# Patient Record
Sex: FEMALE | Race: WHITE | NOT HISPANIC OR LATINO
[De-identification: names, ages, dates, MRNs, and addresses within clinical notes are randomized per-mention and may not be internally consistent; named-entity substitution may affect disease eponyms.]

---

## 2020-09-03 ENCOUNTER — LABORATORY RESULT (OUTPATIENT)
Age: 40
End: 2020-09-03

## 2020-09-03 ENCOUNTER — APPOINTMENT (OUTPATIENT)
Dept: HEMATOLOGY ONCOLOGY | Facility: CLINIC | Age: 40
End: 2020-09-03
Payer: MEDICAID

## 2020-09-03 ENCOUNTER — OUTPATIENT (OUTPATIENT)
Dept: OUTPATIENT SERVICES | Facility: HOSPITAL | Age: 40
LOS: 1 days | Discharge: HOME | End: 2020-09-03

## 2020-09-03 VITALS
DIASTOLIC BLOOD PRESSURE: 90 MMHG | RESPIRATION RATE: 14 BRPM | SYSTOLIC BLOOD PRESSURE: 131 MMHG | BODY MASS INDEX: 26.43 KG/M2 | WEIGHT: 140 LBS | TEMPERATURE: 99.2 F | HEART RATE: 78 BPM | HEIGHT: 61 IN

## 2020-09-03 DIAGNOSIS — Z87.898 PERSONAL HISTORY OF OTHER SPECIFIED CONDITIONS: ICD-10-CM

## 2020-09-03 DIAGNOSIS — Z78.9 OTHER SPECIFIED HEALTH STATUS: ICD-10-CM

## 2020-09-03 DIAGNOSIS — Z83.3 FAMILY HISTORY OF DIABETES MELLITUS: ICD-10-CM

## 2020-09-03 DIAGNOSIS — Z87.891 PERSONAL HISTORY OF NICOTINE DEPENDENCE: ICD-10-CM

## 2020-09-03 DIAGNOSIS — Z00.00 ENCOUNTER FOR GENERAL ADULT MEDICAL EXAMINATION W/OUT ABNORMAL FINDINGS: ICD-10-CM

## 2020-09-03 DIAGNOSIS — E28.2 POLYCYSTIC OVARIAN SYNDROME: ICD-10-CM

## 2020-09-03 DIAGNOSIS — Z80.0 FAMILY HISTORY OF MALIGNANT NEOPLASM OF DIGESTIVE ORGANS: ICD-10-CM

## 2020-09-03 PROCEDURE — 99215 OFFICE O/P EST HI 40 MIN: CPT

## 2020-09-04 LAB
ALBUMIN SERPL ELPH-MCNC: 4.4 G/DL
ALP BLD-CCNC: 91 U/L
ALT SERPL-CCNC: 17 U/L
ANION GAP SERPL CALC-SCNC: 16 MMOL/L
APTT BLD: 34 SEC
AST SERPL-CCNC: 20 U/L
BILIRUB SERPL-MCNC: 0.3 MG/DL
BUN SERPL-MCNC: 17 MG/DL
CALCIUM SERPL-MCNC: 10.5 MG/DL
CHLORIDE SERPL-SCNC: 98 MMOL/L
CO2 SERPL-SCNC: 25 MMOL/L
CREAT SERPL-MCNC: 1 MG/DL
GLUCOSE SERPL-MCNC: 89 MG/DL
HBV CORE IGM SER QL: NONREACTIVE
HBV SURFACE AB SER QL: REACTIVE
HBV SURFACE AG SER QL: NONREACTIVE
HCG UR QL: NEGATIVE
HCT VFR BLD CALC: 41.5 %
HCV AB SER QL: NONREACTIVE
HCV S/CO RATIO: 0.08 S/CO
HGB BLD-MCNC: 13.6 G/DL
HIV1+2 AB SPEC QL IA.RAPID: NONREACTIVE
INR PPP: 1.11 RATIO
LDH SERPL-CCNC: 290
MCHC RBC-ENTMCNC: 27.5 PG
MCHC RBC-ENTMCNC: 32.8 G/DL
MCV RBC AUTO: 83.8 FL
PLATELET # BLD AUTO: 390 K/UL
PMV BLD: 9.4 FL
POTASSIUM SERPL-SCNC: 5 MMOL/L
PROT SERPL-MCNC: 7.4 G/DL
PT BLD: 12.8 SEC
RBC # BLD: 4.95 M/UL
RBC # FLD: 11.9 %
SODIUM SERPL-SCNC: 139 MMOL/L
WBC # FLD AUTO: 8.33 K/UL

## 2020-09-04 NOTE — HISTORY OF PRESENT ILLNESS
[de-identified] : This is a 39 year old female who is here for evaluation.\par Pt usually lives in [place] and was evaluated in a local hospital for complaints of  SOB and cough since 3 months.\par She underwent work up as noted below:\par Ct neck and chest showed:\par B/L pleural effusions and near complete collapse of left lung.\par A large soft tissue mass in superior mediastinum measuring 8.2 X 6 cm. Soft tissue mass extending along left lateral margin of sternum and extending to th eanterior abdominal wall.\par Enlarged B/L cervical LNs with a 2.0 x 2.7 on right side and 1.7 x1.6 on left side and B/L supraclavicular Lymphadenopathy.\par \par Pt underwent US guided biopsy of the chest wall mass and cervical LN excision\par Flow cytometry was negative.\par Pathology on the LNs was benign.\par USG guided core biopsy was inconclusive with suspicion for Classic Hodgkin Lymphoma.\par She also underwent thoracentesis\par Pt feels better since thoracentesis.\par She has H/O drenching night sweats and itching\par no fever.\par Wt loss 5-10 lbs.\par \par She transferred her care to [place] as she has family here. pt has not been treated yet.

## 2020-09-04 NOTE — REVIEW OF SYSTEMS
[Patient Intake Form Reviewed] : Patient intake form was reviewed [Night Sweats] : night sweats [Chills] : chills [Negative] : Eyes [Recent Change In Weight] : ~T recent weight change

## 2020-09-04 NOTE — PHYSICAL EXAM
[Fully active, able to carry on all pre-disease performance without restriction] : Status 0 - Fully active, able to carry on all pre-disease performance without restriction [Thin] : thin [Normal] : grossly intact [de-identified] : B/L enlarged cervical LNs, Rt side steri strips in place [de-identified] : Decreased air entry left base [de-identified] : LNs as mentioned above [de-identified] : Left breast is larger than right

## 2020-09-04 NOTE — ASSESSMENT
[FreeTextEntry1] : In summary this is a very pleasant 39 year old woman with bulky lymphadenopathy involving the neck and mediastinal LNs and presence of Left pleural effusion.\par The pathology so far is inconclusive.\par I spoke at length with the pathologist at the [facility] who could not confirm the diagnosis of Hodgkin lymphoma due to limited material. The Cervical LN per the pathologist looked benign although detailed IHC was not performed on it.\par The slides and the block has been recd at [facility].\par I discussed with the pt and her sister that Hodgkin lymphoma is high on the differential but it is important to have the slides reviewed and an accurate diagnosis obtained. If the material recd by pathology is not adequate she may need a rebiopsy.\par I discussed that staging w/u needs to be completed and discussed that treatment is likely to be chemotherapy +/- RT. Side effects of chemo were briefly discussed. \par I will send her for a PET scan and ECHO.\par She underwent labs today.\par She has an appt in [facility] next week and will return for follow up after that appt.\par She was advised to come to ER should she develop symptoms of SOB, chest pain etc. She has a pulse oximeter and has been monitoring her O2 sat.\par All of her questions and concerns were addressed.

## 2020-09-09 ENCOUNTER — APPOINTMENT (OUTPATIENT)
Dept: HEMATOLOGY ONCOLOGY | Facility: CLINIC | Age: 40
End: 2020-09-09

## 2020-09-09 DIAGNOSIS — J90 PLEURAL EFFUSION, NOT ELSEWHERE CLASSIFIED: ICD-10-CM

## 2020-09-09 DIAGNOSIS — C81.90 HODGKIN LYMPHOMA, UNSPECIFIED, UNSPECIFIED SITE: ICD-10-CM

## 2020-12-23 ENCOUNTER — RESULT REVIEW (OUTPATIENT)
Age: 40
End: 2020-12-23

## 2020-12-23 ENCOUNTER — OUTPATIENT (OUTPATIENT)
Dept: OUTPATIENT SERVICES | Facility: HOSPITAL | Age: 40
LOS: 1 days | Discharge: HOME | End: 2020-12-23
Payer: MEDICAID

## 2020-12-23 DIAGNOSIS — C81.90 HODGKIN LYMPHOMA, UNSPECIFIED, UNSPECIFIED SITE: ICD-10-CM

## 2020-12-23 LAB — GLUCOSE BLDC GLUCOMTR-MCNC: 82 MG/DL — SIGNIFICANT CHANGE UP (ref 70–99)

## 2020-12-23 PROCEDURE — 78815 PET IMAGE W/CT SKULL-THIGH: CPT | Mod: 26,PI

## 2021-01-04 ENCOUNTER — APPOINTMENT (OUTPATIENT)
Dept: HEMATOLOGY ONCOLOGY | Facility: CLINIC | Age: 41
End: 2021-01-04
Payer: MEDICAID

## 2021-01-04 ENCOUNTER — OUTPATIENT (OUTPATIENT)
Dept: OUTPATIENT SERVICES | Facility: HOSPITAL | Age: 41
LOS: 1 days | Discharge: HOME | End: 2021-01-04

## 2021-01-04 VITALS
RESPIRATION RATE: 16 BRPM | TEMPERATURE: 98.6 F | DIASTOLIC BLOOD PRESSURE: 73 MMHG | HEIGHT: 61 IN | BODY MASS INDEX: 24.55 KG/M2 | SYSTOLIC BLOOD PRESSURE: 108 MMHG | WEIGHT: 130 LBS | HEART RATE: 93 BPM

## 2021-01-04 PROCEDURE — 99215 OFFICE O/P EST HI 40 MIN: CPT

## 2021-01-10 NOTE — ASSESSMENT
[FreeTextEntry1] : In summary this is a very pleasant 39 year old woman with bulky lymphadenopathy involving the neck and mediastinal LNs and presence of Left pleural effusion.\par \par \par Hogkin's Lymphoma Diagnosed by biopsy of mediastinal conglerumate at [place] ( pt did not bring biopsy report with her ) \par The pathology from [place] was  inconclusive.\par We spoke at length with the pathologist at the [place] hospital who could not confirm the diagnosis of Hodgkin lymphoma due to limited material. The Cervical LN per the pathologist looked benign although detailed IHC was not performed on it.\par Since her last visit pt had 2 PET scans done , one at [place]  ( hard copy provided by pt \par ) and other one on 12/23/20 by radiology department here . Both results were reviewed with her and they both showed advanced disease ,  for instance multifocal FDG avid lymphadenopathy seen within the head and neck and thorax. Most extensive region of disease is within the anterior mediastinum with extension into the chest wall. Maximum SUV measures 17.7 in the right cervical chain.\par Large left pleural effusion which is not FDG avid with resultant near complete atelectasis of the left lung.\par Right tracheal deviation secondary to lymphadenopathy.\par \par Pt received 1 cycle of low dose ABVD at [facility] in [place] and does not want to get any more treatments. Reports is getting iv vitamin infusions now and they are helping her , she was educated about the prognosis and treatment of hogkin's lymphoma . She was told that with treatment pt's can have an excellent prognosis , with prolonged disease free and overall survival.\par Pt and her sister both were adamant that they will like to continue with holistic medicine and refuse medical treatment . \par They were told that they can come back to us for treatment if they change their mind . \par All of her questions and concerns were addressed.

## 2021-01-10 NOTE — PHYSICAL EXAM
[Fully active, able to carry on all pre-disease performance without restriction] : Status 0 - Fully active, able to carry on all pre-disease performance without restriction [Thin] : thin [Normal] : affect appropriate [de-identified] : Lost 10 pounds since last visit  [de-identified] : B/L enlarged cervical LNs, Rt side steri strips in place, not cooperative with exam  [de-identified] : Left breast is larger than right [de-identified] : Decreased air entry left lung from apex to base  [de-identified] : LNs as mentioned above

## 2021-01-10 NOTE — HISTORY OF PRESENT ILLNESS
[de-identified] : This is a 39 year old female who is here for evaluation.\par Pt usually lives in [place] and was evaluated in a local hospital for complaints of  SOB and cough since 3 months.\par She underwent work up as noted below:\par Ct neck and chest showed:\par B/L pleural effusions and near complete collapse of left lung.\par A large soft tissue mass in superior mediastinum measuring 8.2 X 6 cm. Soft tissue mass extending along left lateral margin of sternum and extending to th eanterior abdominal wall.\par Enlarged B/L cervical LNs with a 2.0 x 2.7 on right side and 1.7 x1.6 on left side and B/L supraclavicular Lymphadenopathy.\par \par Pt underwent US guided biopsy of the chest wall mass and cervical LN excision\par Flow cytometry was negative.\par Pathology on the LNs was benign.\par USG guided core biopsy was inconclusive with suspicion for Classic Hodgkin Lymphoma.\par She also underwent thoracentesis\par Pt feels better since thoracentesis.\par She has H/O drenching night sweats and itching\par no fever.\par Wt loss 5-10 lbs.\par \par She transferred her care to [place] as she has family here. pt has not been treated yet. [de-identified] : 1/4/21 \par Pt presented for f/u today , she was initially seen in September of 2020, at that time pt was evaluated for bulky mediastinal and cervical lymphadenopathy and pathology was inconclusive . She was encouraged to get second opinion as well as get records from hospital in [place] . She was also given script for PET scan . Pt was then lost to f/u and presented today . She states that she was seen at [facility] and had PET  scan that showed infiltrative large caliber prrimarily mediastinal tumor , that encased heart , involved pericardium . Numerous FDG avid cervical , supraclavicular  , subpectoral , mediastinal and hilar lymph nodes . Pt then had biopsy over there bt [facility] , she did not bring the results of that biopsy . Pt then saw a Holistic Doctor , and decided to get holistic treatment ,she went to [place] and was there for 4 months . States that she received 1 cycle of low dose ABVD , developed anaphylactic reaction to Dacarbazine , and decided not to get chemo . She wishes to c/w holistic treatment for now . She did get a repeat PET scan on 12/23/20 that showed.\par IMPRESSION:\par Multifocal FDG avid lymphadenopathy seen within the head and neck and thorax. Most extensive region of disease is within the anterior mediastinum with extension into the chest wall. Maximum SUV measures 17.7 in the right cervical chain.\par Large left pleural effusion which is not FDG avid with resultant near complete atelectasis of the left lung.\par Small pericardial effusion, not FDG avid.\par Right tracheal deviation secondary to lymphadenopathy.\par \par Pt believes that she is responding to Holistic treatment and wishes not to pursue with chemotherapy . She reports she feels fine and has no SOB or any other symptoms. Reports night sweats have improved as well. Results and possible treatment options were discussed with pt and her sister. They do not wish to discuss treatment anymore .

## 2021-01-10 NOTE — REVIEW OF SYSTEMS
[Patient Intake Form Reviewed] : Patient intake form was reviewed [Chills] : chills [Night Sweats] : night sweats [Recent Change In Weight] : ~T recent weight change [Negative] : Neurological [Chest Pain] : no chest pain [Palpitations] : no palpitations [FreeTextEntry2] : Reports improvement in her symptoms  [Leg Claudication] : no intermittent leg claudication [FreeTextEntry6] : denies SOB

## 2021-01-12 DIAGNOSIS — J90 PLEURAL EFFUSION, NOT ELSEWHERE CLASSIFIED: ICD-10-CM

## 2021-01-12 DIAGNOSIS — C81.90 HODGKIN LYMPHOMA, UNSPECIFIED, UNSPECIFIED SITE: ICD-10-CM

## 2021-02-27 ENCOUNTER — INPATIENT (INPATIENT)
Facility: HOSPITAL | Age: 41
LOS: 6 days | Discharge: HOME | End: 2021-03-06
Attending: INTERNAL MEDICINE | Admitting: INTERNAL MEDICINE
Payer: COMMERCIAL

## 2021-02-27 VITALS
RESPIRATION RATE: 18 BRPM | OXYGEN SATURATION: 98 % | WEIGHT: 119.93 LBS | DIASTOLIC BLOOD PRESSURE: 72 MMHG | HEIGHT: 66 IN | HEART RATE: 120 BPM | SYSTOLIC BLOOD PRESSURE: 114 MMHG | TEMPERATURE: 99 F

## 2021-02-27 LAB
ALBUMIN FLD-MCNC: 2.8 G/DL — SIGNIFICANT CHANGE UP
ALBUMIN SERPL ELPH-MCNC: 3.6 G/DL — SIGNIFICANT CHANGE UP (ref 3.5–5.2)
ALP SERPL-CCNC: 105 U/L — SIGNIFICANT CHANGE UP (ref 30–115)
ALT FLD-CCNC: 20 U/L — SIGNIFICANT CHANGE UP (ref 0–41)
ANION GAP SERPL CALC-SCNC: 14 MMOL/L — SIGNIFICANT CHANGE UP (ref 7–14)
APTT BLD: 35.5 SEC — SIGNIFICANT CHANGE UP (ref 27–39.2)
AST SERPL-CCNC: 37 U/L — SIGNIFICANT CHANGE UP (ref 0–41)
B PERT IGG+IGM PNL SER: ABNORMAL
BASE EXCESS BLDV CALC-SCNC: 3.5 MMOL/L — HIGH (ref -2–2)
BASOPHILS # BLD AUTO: 0.02 K/UL — SIGNIFICANT CHANGE UP (ref 0–0.2)
BASOPHILS NFR BLD AUTO: 0.2 % — SIGNIFICANT CHANGE UP (ref 0–1)
BILIRUB SERPL-MCNC: 0.2 MG/DL — SIGNIFICANT CHANGE UP (ref 0.2–1.2)
BLD GP AB SCN SERPL QL: SIGNIFICANT CHANGE UP
BUN SERPL-MCNC: 10 MG/DL — SIGNIFICANT CHANGE UP (ref 10–20)
CA-I SERPL-SCNC: 1.15 MMOL/L — SIGNIFICANT CHANGE UP (ref 1.12–1.3)
CALCIUM SERPL-MCNC: 9.3 MG/DL — SIGNIFICANT CHANGE UP (ref 8.5–10.1)
CHLORIDE SERPL-SCNC: 97 MMOL/L — LOW (ref 98–110)
CO2 SERPL-SCNC: 24 MMOL/L — SIGNIFICANT CHANGE UP (ref 17–32)
COLOR FLD: YELLOW — SIGNIFICANT CHANGE UP
CREAT SERPL-MCNC: 0.7 MG/DL — SIGNIFICANT CHANGE UP (ref 0.7–1.5)
EOSINOPHIL # BLD AUTO: 0.05 K/UL — SIGNIFICANT CHANGE UP (ref 0–0.7)
EOSINOPHIL NFR BLD AUTO: 0.4 % — SIGNIFICANT CHANGE UP (ref 0–8)
FLUID INTAKE SUBSTANCE CLASS: SIGNIFICANT CHANGE UP
FLUID SEGMENTED GRANULOCYTES: 20 % — SIGNIFICANT CHANGE UP
FOLATE+VIT B12 SERBLD-IMP: 0 % — SIGNIFICANT CHANGE UP
GAS PNL BLDV: 139 MMOL/L — SIGNIFICANT CHANGE UP (ref 136–145)
GAS PNL BLDV: SIGNIFICANT CHANGE UP
GAS PNL BLDV: SIGNIFICANT CHANGE UP
GLUCOSE FLD-MCNC: 128 MG/DL — SIGNIFICANT CHANGE UP
GLUCOSE SERPL-MCNC: 121 MG/DL — HIGH (ref 70–99)
GRAM STN FLD: SIGNIFICANT CHANGE UP
HCG SERPL QL: NEGATIVE — SIGNIFICANT CHANGE UP
HCO3 BLDV-SCNC: 28 MMOL/L — SIGNIFICANT CHANGE UP (ref 22–29)
HCT VFR BLD CALC: 31.5 % — LOW (ref 37–47)
HCT VFR BLD CALC: 34.6 % — LOW (ref 37–47)
HCT VFR BLDA CALC: 33.4 % — LOW (ref 34–44)
HGB BLD CALC-MCNC: 10.9 G/DL — LOW (ref 14–18)
HGB BLD-MCNC: 11 G/DL — LOW (ref 12–16)
HGB BLD-MCNC: 9.8 G/DL — LOW (ref 12–16)
IMM GRANULOCYTES NFR BLD AUTO: 0.3 % — SIGNIFICANT CHANGE UP (ref 0.1–0.3)
INR BLD: 1.4 RATIO — HIGH (ref 0.65–1.3)
IRON SATN MFR SERPL: 21 UG/DL — LOW (ref 35–150)
LACTATE BLDV-MCNC: SIGNIFICANT CHANGE UP MMOL/L (ref 0.5–1.6)
LDH SERPL L TO P-CCNC: 99 U/L — SIGNIFICANT CHANGE UP
LYMPHOCYTES # BLD AUTO: 0.89 K/UL — LOW (ref 1.2–3.4)
LYMPHOCYTES # BLD AUTO: 6.9 % — LOW (ref 20.5–51.1)
LYMPHOCYTES # FLD: 60 — SIGNIFICANT CHANGE UP
MCHC RBC-ENTMCNC: 24.5 PG — LOW (ref 27–31)
MCHC RBC-ENTMCNC: 24.9 PG — LOW (ref 27–31)
MCHC RBC-ENTMCNC: 31.1 G/DL — LOW (ref 32–37)
MCHC RBC-ENTMCNC: 31.8 G/DL — LOW (ref 32–37)
MCV RBC AUTO: 78.3 FL — LOW (ref 81–99)
MCV RBC AUTO: 78.8 FL — LOW (ref 81–99)
MONOCYTES # BLD AUTO: 0.8 K/UL — HIGH (ref 0.1–0.6)
MONOCYTES NFR BLD AUTO: 6.2 % — SIGNIFICANT CHANGE UP (ref 1.7–9.3)
MONOS+MACROS # FLD: 20 % — SIGNIFICANT CHANGE UP
NEUTROPHILS # BLD AUTO: 11.05 K/UL — HIGH (ref 1.4–6.5)
NEUTROPHILS NFR BLD AUTO: 86 % — HIGH (ref 42.2–75.2)
NRBC # BLD: 0 /100 WBCS — SIGNIFICANT CHANGE UP (ref 0–0)
NRBC # BLD: 0 /100 WBCS — SIGNIFICANT CHANGE UP (ref 0–0)
NT-PROBNP SERPL-SCNC: 39 PG/ML — SIGNIFICANT CHANGE UP (ref 0–300)
PCO2 BLDV: 44 MMHG — SIGNIFICANT CHANGE UP (ref 41–51)
PH BLDV: 7.42 — SIGNIFICANT CHANGE UP (ref 7.26–7.43)
PH FLD: 7.8 — SIGNIFICANT CHANGE UP
PLATELET # BLD AUTO: 385 K/UL — SIGNIFICANT CHANGE UP (ref 130–400)
PLATELET # BLD AUTO: 453 K/UL — HIGH (ref 130–400)
PO2 BLDV: 27 MMHG — SIGNIFICANT CHANGE UP (ref 20–40)
POTASSIUM BLDV-SCNC: 4.1 MMOL/L — SIGNIFICANT CHANGE UP (ref 3.3–5.6)
POTASSIUM SERPL-MCNC: 4.8 MMOL/L — SIGNIFICANT CHANGE UP (ref 3.5–5)
POTASSIUM SERPL-SCNC: 4.8 MMOL/L — SIGNIFICANT CHANGE UP (ref 3.5–5)
PROT FLD-MCNC: 4.6 G/DL — SIGNIFICANT CHANGE UP
PROT SERPL-MCNC: 6.9 G/DL — SIGNIFICANT CHANGE UP (ref 6–8)
PROTHROM AB SERPL-ACNC: 16.1 SEC — HIGH (ref 9.95–12.87)
RAPID RVP RESULT: SIGNIFICANT CHANGE UP
RBC # BLD: 4 M/UL — LOW (ref 4.2–5.4)
RBC # BLD: 4.42 M/UL — SIGNIFICANT CHANGE UP (ref 4.2–5.4)
RBC # FLD: 14.7 % — HIGH (ref 11.5–14.5)
RBC # FLD: 15.1 % — HIGH (ref 11.5–14.5)
RCV VOL RI: 0 /UL — SIGNIFICANT CHANGE UP (ref 0–0)
SAO2 % BLDV: 42 % — SIGNIFICANT CHANGE UP
SARS-COV-2 RNA SPEC QL NAA+PROBE: SIGNIFICANT CHANGE UP
SODIUM SERPL-SCNC: 135 MMOL/L — SIGNIFICANT CHANGE UP (ref 135–146)
SPECIMEN SOURCE: SIGNIFICANT CHANGE UP
TOTAL NUCLEATED CELL COUNT, BODY FLUID: 663 /UL — SIGNIFICANT CHANGE UP
TROPONIN T SERPL-MCNC: <0.01 NG/ML — SIGNIFICANT CHANGE UP
TUBE TYPE: SIGNIFICANT CHANGE UP
WBC # BLD: 12.85 K/UL — HIGH (ref 4.8–10.8)
WBC # BLD: 8.93 K/UL — SIGNIFICANT CHANGE UP (ref 4.8–10.8)
WBC # FLD AUTO: 12.85 K/UL — HIGH (ref 4.8–10.8)
WBC # FLD AUTO: 8.93 K/UL — SIGNIFICANT CHANGE UP (ref 4.8–10.8)

## 2021-02-27 PROCEDURE — 99291 CRITICAL CARE FIRST HOUR: CPT | Mod: 25

## 2021-02-27 PROCEDURE — 71275 CT ANGIOGRAPHY CHEST: CPT | Mod: 26

## 2021-02-27 PROCEDURE — 93010 ELECTROCARDIOGRAM REPORT: CPT

## 2021-02-27 PROCEDURE — 32554 ASPIRATE PLEURA W/O IMAGING: CPT

## 2021-02-27 PROCEDURE — 71045 X-RAY EXAM CHEST 1 VIEW: CPT | Mod: 26

## 2021-02-27 RX ORDER — ONDANSETRON 8 MG/1
4 TABLET, FILM COATED ORAL ONCE
Refills: 0 | Status: COMPLETED | OUTPATIENT
Start: 2021-02-27 | End: 2021-02-27

## 2021-02-27 RX ORDER — ENOXAPARIN SODIUM 100 MG/ML
40 INJECTION SUBCUTANEOUS AT BEDTIME
Refills: 0 | Status: DISCONTINUED | OUTPATIENT
Start: 2021-02-27 | End: 2021-03-04

## 2021-02-27 RX ORDER — SENNA PLUS 8.6 MG/1
2 TABLET ORAL AT BEDTIME
Refills: 0 | Status: DISCONTINUED | OUTPATIENT
Start: 2021-02-27 | End: 2021-03-06

## 2021-02-27 RX ORDER — MORPHINE SULFATE 50 MG/1
1 CAPSULE, EXTENDED RELEASE ORAL ONCE
Refills: 0 | Status: DISCONTINUED | OUTPATIENT
Start: 2021-02-27 | End: 2021-02-27

## 2021-02-27 RX ORDER — PANTOPRAZOLE SODIUM 20 MG/1
40 TABLET, DELAYED RELEASE ORAL
Refills: 0 | Status: DISCONTINUED | OUTPATIENT
Start: 2021-02-27 | End: 2021-03-06

## 2021-02-27 RX ORDER — MORPHINE SULFATE 50 MG/1
2 CAPSULE, EXTENDED RELEASE ORAL ONCE
Refills: 0 | Status: DISCONTINUED | OUTPATIENT
Start: 2021-02-27 | End: 2021-02-27

## 2021-02-27 RX ORDER — CHLORHEXIDINE GLUCONATE 213 G/1000ML
1 SOLUTION TOPICAL
Refills: 0 | Status: DISCONTINUED | OUTPATIENT
Start: 2021-02-27 | End: 2021-03-06

## 2021-02-27 RX ORDER — OXYCODONE AND ACETAMINOPHEN 5; 325 MG/1; MG/1
1 TABLET ORAL EVERY 6 HOURS
Refills: 0 | Status: DISCONTINUED | OUTPATIENT
Start: 2021-02-27 | End: 2021-03-01

## 2021-02-27 RX ORDER — GUAIFENESIN/DEXTROMETHORPHAN 600MG-30MG
5 TABLET, EXTENDED RELEASE 12 HR ORAL ONCE
Refills: 0 | Status: COMPLETED | OUTPATIENT
Start: 2021-02-27 | End: 2021-02-27

## 2021-02-27 RX ADMIN — MORPHINE SULFATE 2 MILLIGRAM(S): 50 CAPSULE, EXTENDED RELEASE ORAL at 05:51

## 2021-02-27 RX ADMIN — OXYCODONE AND ACETAMINOPHEN 1 TABLET(S): 5; 325 TABLET ORAL at 11:44

## 2021-02-27 RX ADMIN — Medication 250 MILLIGRAM(S): at 10:45

## 2021-02-27 RX ADMIN — ENOXAPARIN SODIUM 40 MILLIGRAM(S): 100 INJECTION SUBCUTANEOUS at 20:34

## 2021-02-27 RX ADMIN — Medication 5 MILLILITER(S): at 13:29

## 2021-02-27 RX ADMIN — ONDANSETRON 4 MILLIGRAM(S): 8 TABLET, FILM COATED ORAL at 13:29

## 2021-02-27 RX ADMIN — MORPHINE SULFATE 1 MILLIGRAM(S): 50 CAPSULE, EXTENDED RELEASE ORAL at 22:58

## 2021-02-27 RX ADMIN — PANTOPRAZOLE SODIUM 40 MILLIGRAM(S): 20 TABLET, DELAYED RELEASE ORAL at 06:06

## 2021-02-27 NOTE — ED PROCEDURE NOTE - CPROC ED INFORMED CONSENT1
Benefits, risks, and possible complications of procedure explained to patient/caregiver who verbalized understanding and gave written consent. This was an emergent procedure and consent was implied.

## 2021-02-27 NOTE — ED PROVIDER NOTE - NS ED ROS FT
Constitutional:  No fevers or chills.  Eyes:  No visual changes.  ENT:  No sore throat.  Neck:  No neck pain or stiffness.  Cardiac:  No edema/calf pain.  Resp: +Cough/SOB. No hemoptysis.   GI:  No nausea, vomiting, diarrhea, or abdominal pain.  :  No dysuria, frequency, or hematuria.  MSK:  No myalgias.  Neuro:  No headache/dizziness.  Skin:  No skin rash.

## 2021-02-27 NOTE — CHART NOTE - NSCHARTNOTEFT_GEN_A_CORE
patient expressed that she had an outpatient iron infusion scheduled for monday due to iron deficiency. She expressed concerns that she may not be able to make the appointment if still here in the hospital . So , she wants to know if she can get the infusion here. I ordered a serum iron level . Please follow up iron study for consideration of a  possible iron infusion on Monday     Patient also has a H/o H pylori for which she completed a course of  abx , patient vocalizes concerns about recurrence as she states she still has the same  abdominal pain on the left upper and lower quadrants , flanks , and back . I ordered a stool antigen for H pylori . Please follow up. patient expressed that she had an outpatient iron infusion scheduled for monday due to iron deficiency. She expressed concerns that she may not be able to make the appointment if still here in the hospital . So , she wants to know if she can get the infusion here. I ordered a serum iron level . Please follow up iron study for consideration of a  possible iron infusion on Monday     Patient also has a H/o H pylori for which she completed a course of  abx , patient vocalizes concerns about recurrence as she states she still has the same  abdominal pain on the left upper and lower quadrants , flanks , and back . I ordered a stool antigen for H pylori . Please follow up.    Lastly, patient was concerned that pulmonology had not seen her for her second thoracentesis . After talking with her , she agrees to give them time . I called the pulmonology service and left a message .

## 2021-02-27 NOTE — ED ADULT TRIAGE NOTE - CHIEF COMPLAINT QUOTE
Pt c/o SOB states she knows she needs her lungs tapped, has had hx of same symptoms in Sept. Pt with known hodgkins lymphoma. Pt also c/o persistent cough and feeling weak,. Pt c/o SOB states she knows she "needs her lungs tapped", has had hx of same symptoms in Sept. Pt with known hodgkin's lymphoma dx. Pt also c/o persistent cough and feeling weak, states she needs iron infusion Monday and CT Tuesday.  EKG sinus tachycardia in triage; pt cleared thru crit

## 2021-02-27 NOTE — ED PROVIDER NOTE - CLINICAL SUMMARY MEDICAL DECISION MAKING FREE TEXT BOX
pt evaluated for SOB, emergent thoracentesis performed as pt in respiratory distress with large left effusion noted on CXR. Pt sx improved significantly, admitted to ICU for further workup and treatment

## 2021-02-27 NOTE — ED ADULT NURSE NOTE - CHIEF COMPLAINT QUOTE
Pt c/o SOB states she knows she "needs her lungs tapped", has had hx of same symptoms in Sept. Pt with known hodgkin's lymphoma dx. Pt also c/o persistent cough and feeling weak, states she needs iron infusion Monday and CT Tuesday.  EKG sinus tachycardia in triage; pt cleared thru crit

## 2021-02-27 NOTE — ED PROVIDER NOTE - PHYSICAL EXAMINATION
PHYSICAL EXAM: I have reviewed current vital signs.  GENERAL: Moderate respiratory distress, tachypneic, speaking in 2-3 word phrases.  HEAD:  Normocephalic, atraumatic.  EYES: Conjunctiva and sclera clear.  ENT: MMM.  NECK: Supple, FROM.  CHEST/LUNG: Distant lung sound left sided, no wheezing/rales, symmetric expansion, O2 sat in 90's on RA.  HEART: Sinus tachy, normal S1 and S2; no murmurs, rubs, or gallops. Breast- chaperone present Dr. Lin. No masses or nipple d/c, left breast significantly larger than right, no discoloration or increased warmth.  ABDOMEN: Soft, nontender, nondistended.  EXTREMITIES:  2+ peripheral pulses; FROM.  NEUROLOGY: A&O x 3. Motor 5/5. No focal neurological deficits.   SKIN: Warm and dry.

## 2021-02-27 NOTE — H&P ADULT - HISTORY OF PRESENT ILLNESS
This is a 40 year old F patient with Hx of Hodgkin lymphoma ( Active, S/p one dose of APVD ) presented to the ED for shortness of breath of 3 days duration. Patient was doing her baseline until 3 days prior to admission when she started to expeience progressive shortness of breath that started as exertional shortness of breath and progressed to being nonexertional shortness of breath. Patient also endorsed drenching night sweats and occasional fever. Patient also complained of left sided non radiating pleuritic chest pain that increases with cough and deep breaths. Patient denied any exertional chest pain, palpitation, dizziness or any other symptoms.     -Patient was diagnosed with Hodgkin lymphoma back in Aug 2020 when she had similar presentation, at that time chest xray showed pleural effusion that was relieved by a thora, patient also had a biopsy of the lymph nodes and a PET scan which are found in the chart. Patient used to follow up with Dr. Schultz but after receiving the first dose of the APVD she developed a "Bad allergic reaction" and switched completely to holistic medicine and has been off any chemotherapy since then.   In the ED patient had an Xray which showed complete opacification of the Left lung with deviation of the trachea to the right side, patient underwent a thora in the ED to relieve the SOB but still endorse that she is short of breath and unable to speak in full sentences     In the ED Vital Signs Last 24 Hrs  T(C): 37.1 (27 Feb 2021 00:48), Max: 37.1 (27 Feb 2021 00:48)  T(F): 98.8 (27 Feb 2021 00:48), Max: 98.8 (27 Feb 2021 00:48)  HR: 97 (27 Feb 2021 00:55) (97 - 120)  BP: 114/72 (27 Feb 2021 00:48) (114/72 - 114/72)  RR: 18 (27 Feb 2021 00:48) (18 - 18)  SpO2: 98% (27 Feb 2021 00:48) (98% - 98%)

## 2021-02-27 NOTE — ED ADULT TRIAGE NOTE - NSWEIGHTCALCTOOLDRUG_GEN_A_CORE
· Keep a list of your medicines with you. List all of the prescription medicines, nonprescription medicines, supplements, natural remedies, and vitamins that you take. Tell your healthcare providers who treat you about all of the products you are taking. Your provider can provide you with a form to keep track of them. Just ask. · Follow the directions that come with your medicine, including information about food or alcohol. Make sure you know how and when to take your medicine. Do not take more or less than you are supposed to take. · Keep all medicines out of the reach of children. · Store medicines according to the directions on the label. · Monitor yourself. Learn to know how your body reacts to your new medicine and keep track of how it makes you feel before attempting (If your provider has allowed you to do so) to drive or go to work. · Seek emergency medical attention if you think you have used too much of this medicine. An overdose of any prescription medicine can be fatal. Overdose symptoms may include extreme drowsiness, muscle weakness, confusion, cold and clammy skin, pinpoint pupils, shallow breathing, slow heart rate, fainting, or coma. · Don't share prescription medicines with others, even when they seem to have the same symptoms. What may be good for you may be harmful to others. · If you are no longer taking a prescribed medication and you have pills left please take your pills out of their original containers. Mix crushed pills with an undesirable substance, such as cat litter or used coffee grounds. Put the mixture into a disposable container with a lid, such as an empty margarine tub, or into a sealable bag. Cover up or remove any of your personal information on the empty containers by covering it with black permanent marker or duct tape. Place the sealed container with the mixture, and the empty drug containers, in the trash.    · If you use a medication that is in the form of a patch, dispose of used patches by folding them in half so that the sticky sides meet, and then flushing them down a toilet. They should not be placed in the household trash where children or pets can find them. · If you have any questions, ask your provider or pharmacist for more information. · Be sure to keep all appointments for provider visits or tests. We are committed to providing you with the best care possible. In order to help us achieve these goals please remember to bring all medications, herbal products, and over the counter supplements with you to each visit. If your provider has ordered testing for you, please be sure to follow up with our office if you have not received results within 7 days after the testing took place. *If you receive a survey after visiting one of our offices, please take time to share your experience concerning your physician office visit. These surveys are confidential and no health information about you is shared. We are eager to improve for you and we are counting on your feedback to help make that happen.  used

## 2021-02-27 NOTE — H&P ADULT - NSHPLABSRESULTS_GEN_ALL_CORE
11.0   12.85 )-----------( 453      ( 27 Feb 2021 02:05 )             34.6       02-27    135  |  97<L>  |  10  ----------------------------<  121<H>  4.8   |  24  |  0.7    Ca    9.3      27 Feb 2021 02:05    TPro  6.9  /  Alb  3.6  /  TBili  0.2  /  DBili  x   /  AST  37  /  ALT  20  /  AlkPhos  105  02-27                  PT/INR - ( 27 Feb 2021 02:05 )   PT: 16.10 sec;   INR: 1.40 ratio         PTT - ( 27 Feb 2021 02:05 )  PTT:35.5 sec

## 2021-02-27 NOTE — CHART NOTE - NSCHARTNOTEFT_GEN_A_CORE
Patient downgraded in AM by ICU team.  Endorsed to Dr. Hernandez (plate changed) and 3B senior on call.  Plan per today's H/P.

## 2021-02-27 NOTE — ED PROVIDER NOTE - OBJECTIVE STATEMENT
41yo F with PMH of 41yo F with PMH of untreated Hodgkin's lymphoma presenting to ED with severe SOB and exertional dyspnea x 2-3 days. Pt states she had thoracentesis August 2020 in [place] and then was formally diagnosed September 2020 with Hodgkin's lymphoma biopsy at [place]. Pt went to [facility] for immunotherapy and states she had anaphylaxis to low dose chemo, only tried one round/treatment. Pt came home and now follows with holistic treatment only with Dr. Rowland. +Cough. Pt denies any chest pain, fevers, chills, N/V/D, abdominal pain, tearing back pain, urinary sxs, or injuries/traumas/falls/syncope. Notes her left breast has been more swollen for some time, no discharge. Pt not currently following with oncologist or pulmonologist.

## 2021-02-27 NOTE — H&P ADULT - NSHPPHYSICALEXAM_GEN_ALL_CORE
General: in bed, Mild respiratory distress  Head and neck: Cervical LN are palpable, NC, AT   Heart: S1,S2 appreciated, no added sounds  Lung: Absent breath sounds on the left side, vesicular breath sound on the right side   Abdomen: Soft, nontender, nondistended  LE: no edema

## 2021-02-27 NOTE — ED PROVIDER NOTE - ATTENDING CONTRIBUTION TO CARE
41 yo female with PMH Hodgkin's lymphoma presents c/o SOB and exertional dyspnea x 2-3 days. Pt states she had thoracentesis August 2020 in [place] and then was formally diagnosed September 2020 with biopsy at [place]. Pt went to [place] for immunotherapy and states she had anaphylaxis to low dose chemo. Pt came home and now follows with holistic treatment only with Dr. Rowland. Pt denies any chest pain, cough, fevers, chills, N/V/D, abdominal pain or weakness. Notes her left breast has been more swollen for some time. Pt not currently following with oncologist or pulmonologist.     VITAL SIGNS: noted  CONSTITUTIONAL: Well-developed; well-nourished; in no acute distress  HEAD: Normocephalic; atraumatic  EYES: PERRL, EOM intact; conjunctiva and sclera clear  ENT: No nasal discharge; airway clear. MMM  NECK: Supple; non tender. No JVD noted  CARD: S1, S2 normal; no murmurs, gallops, or rubs. Tachycardic  RESP: decreased BS left, no rales, tachypneic  BREAST: no masses or nipple d/c, left breast significantly larger than right, no discoloration or increased warmth  ABD: Normal bowel sounds; soft; non-distended; non-tender; no CVA tenderness  EXT: Normal ROM. No calf tenderness or edema. Distal pulses intact  NEURO: Alert, oriented. Grossly unremarkable. No focal deficits  SKIN: Skin exam is warm and dry

## 2021-02-27 NOTE — H&P ADULT - ATTENDING COMMENTS
Seen and examined with the ICU resident at the bed side.  Impression and plan as outlined above.  In addition:  Bilateral pleural effusion L>R SP left thoracentesis in the past.  Now SP left thoracentesis in ED  Recommend:  Repeat Thoracentesis after chest US  Hem inc eval.  Downgrade to med floor

## 2021-02-27 NOTE — ED PROVIDER NOTE - CARE PLAN
Principal Discharge DX:	Pleural effusion  Secondary Diagnosis:	Respiratory distress  Secondary Diagnosis:	Hodgkin lymphoma

## 2021-02-27 NOTE — ED PROVIDER NOTE - PROGRESS NOTE DETAILS
Malik- Patient initially seen in main 7, did US of bilateral lungs which showed pleural effusion, hx of Hodgkin's lymphoma Malik- Patient initially seen in main 7, did US of bilateral lungs which showed large left sided pleural effusion, hx of Hodgkin's lymphoma. Moved to crit immediately. Emergent thoracentesis done, drained about 570cc of serous pleural fluid, sent to lab for studies. HR improved from 120's to 80's-90's, increased WOB/tachypnea improved post thoracentesis. CTA negative for PE, showed large left sided pleural effusion, will require inpt heme/onc eval and pulm eval. Spoke with Dr. White, ICU intensivist. Patient approved for ICU.

## 2021-02-27 NOTE — H&P ADULT - ASSESSMENT
IMPRESSION:  Hodgkin Lymphoma   Left sided pleural effusion       PLAN:    CNS: Avoid sedation     HEENT:  Oral care    PULMONARY:  HOB @ 45 degrees, Follow up pleural effusion fluid analysis, Might need repeat thoracocentesis, Keep SpO2> 94%    CARDIOVASCULAR: Keep I=O    GI: GI prophylaxis: Protonix    Feeding: Regular diet     RENAL:  F/u  lytes.  Correct as needed.     INFECTIOUS DISEASE: Follow up procalcitonin, Pleural effusion analysis. No indication for Abx now     HEMATOLOGICAL:  Follow up Heme/Onc, DVT prophylaxis     ENDOCRINE:  Follow up FS.  Insulin protocol if needed.    MUSCULOSKELETAL: Increase as tolerated     CODE STATUS: FULL CODE    DISPOSITION: MICU monitoring

## 2021-02-28 ENCOUNTER — RESULT REVIEW (OUTPATIENT)
Age: 41
End: 2021-02-28

## 2021-02-28 LAB
ALBUMIN SERPL ELPH-MCNC: 3.6 G/DL — SIGNIFICANT CHANGE UP (ref 3.5–5.2)
ALP SERPL-CCNC: 95 U/L — SIGNIFICANT CHANGE UP (ref 30–115)
ALT FLD-CCNC: 17 U/L — SIGNIFICANT CHANGE UP (ref 0–41)
ANION GAP SERPL CALC-SCNC: 15 MMOL/L — HIGH (ref 7–14)
APTT BLD: 36 SEC — SIGNIFICANT CHANGE UP (ref 27–39.2)
AST SERPL-CCNC: 16 U/L — SIGNIFICANT CHANGE UP (ref 0–41)
B PERT IGG+IGM PNL SER: CLEAR — SIGNIFICANT CHANGE UP
BILIRUB SERPL-MCNC: <0.2 MG/DL — SIGNIFICANT CHANGE UP (ref 0.2–1.2)
BLD GP AB SCN SERPL QL: SIGNIFICANT CHANGE UP
BUN SERPL-MCNC: 10 MG/DL — SIGNIFICANT CHANGE UP (ref 10–20)
CALCIUM SERPL-MCNC: 9.2 MG/DL — SIGNIFICANT CHANGE UP (ref 8.5–10.1)
CHLORIDE SERPL-SCNC: 99 MMOL/L — SIGNIFICANT CHANGE UP (ref 98–110)
CO2 SERPL-SCNC: 26 MMOL/L — SIGNIFICANT CHANGE UP (ref 17–32)
COLOR FLD: YELLOW — SIGNIFICANT CHANGE UP
CREAT SERPL-MCNC: 0.6 MG/DL — LOW (ref 0.7–1.5)
FLUID INTAKE SUBSTANCE CLASS: SIGNIFICANT CHANGE UP
FLUID SEGMENTED GRANULOCYTES: 12 % — SIGNIFICANT CHANGE UP
GLUCOSE SERPL-MCNC: 82 MG/DL — SIGNIFICANT CHANGE UP (ref 70–99)
GRAM STN FLD: SIGNIFICANT CHANGE UP
HCT VFR BLD CALC: 37.9 % — SIGNIFICANT CHANGE UP (ref 37–47)
HGB BLD-MCNC: 11.4 G/DL — LOW (ref 12–16)
INR BLD: 1.36 RATIO — HIGH (ref 0.65–1.3)
IRON SATN MFR SERPL: 12 % — LOW (ref 15–50)
IRON SATN MFR SERPL: 29 UG/DL — LOW (ref 35–150)
LYMPHOCYTES # FLD: 77 — SIGNIFICANT CHANGE UP
MCHC RBC-ENTMCNC: 24.5 PG — LOW (ref 27–31)
MCHC RBC-ENTMCNC: 30.1 G/DL — LOW (ref 32–37)
MCV RBC AUTO: 81.3 FL — SIGNIFICANT CHANGE UP (ref 81–99)
MONOS+MACROS # FLD: 11 % — SIGNIFICANT CHANGE UP
NRBC # BLD: 0 /100 WBCS — SIGNIFICANT CHANGE UP (ref 0–0)
PLATELET # BLD AUTO: 443 K/UL — HIGH (ref 130–400)
POTASSIUM SERPL-MCNC: 4.3 MMOL/L — SIGNIFICANT CHANGE UP (ref 3.5–5)
POTASSIUM SERPL-SCNC: 4.3 MMOL/L — SIGNIFICANT CHANGE UP (ref 3.5–5)
PROT SERPL-MCNC: 6.5 G/DL — SIGNIFICANT CHANGE UP (ref 6–8)
PROTHROM AB SERPL-ACNC: 15.6 SEC — HIGH (ref 9.95–12.87)
RBC # BLD: 4.66 M/UL — SIGNIFICANT CHANGE UP (ref 4.2–5.4)
RBC # FLD: 14.8 % — HIGH (ref 11.5–14.5)
RCV VOL RI: 0 /UL — SIGNIFICANT CHANGE UP (ref 0–0)
SODIUM SERPL-SCNC: 140 MMOL/L — SIGNIFICANT CHANGE UP (ref 135–146)
SPECIMEN SOURCE: SIGNIFICANT CHANGE UP
TIBC SERPL-MCNC: 240 UG/DL — SIGNIFICANT CHANGE UP (ref 220–430)
TOTAL NUCLEATED CELL COUNT, BODY FLUID: 534 /UL — SIGNIFICANT CHANGE UP
TRANSFERRIN SERPL-MCNC: 186 MG/DL — LOW (ref 200–360)
TUBE TYPE: SIGNIFICANT CHANGE UP
UIBC SERPL-MCNC: 211 UG/DL — SIGNIFICANT CHANGE UP (ref 110–370)
WBC # BLD: 6.99 K/UL — SIGNIFICANT CHANGE UP (ref 4.8–10.8)
WBC # FLD AUTO: 6.99 K/UL — SIGNIFICANT CHANGE UP (ref 4.8–10.8)

## 2021-02-28 PROCEDURE — 71045 X-RAY EXAM CHEST 1 VIEW: CPT | Mod: 26

## 2021-02-28 PROCEDURE — 88305 TISSUE EXAM BY PATHOLOGIST: CPT | Mod: 26

## 2021-02-28 PROCEDURE — 99233 SBSQ HOSP IP/OBS HIGH 50: CPT

## 2021-02-28 PROCEDURE — 88112 CYTOPATH CELL ENHANCE TECH: CPT | Mod: 26

## 2021-02-28 RX ORDER — KETOROLAC TROMETHAMINE 30 MG/ML
15 SYRINGE (ML) INJECTION ONCE
Refills: 0 | Status: DISCONTINUED | OUTPATIENT
Start: 2021-02-28 | End: 2021-02-28

## 2021-02-28 RX ORDER — LANOLIN ALCOHOL/MO/W.PET/CERES
5 CREAM (GRAM) TOPICAL AT BEDTIME
Refills: 0 | Status: DISCONTINUED | OUTPATIENT
Start: 2021-02-28 | End: 2021-03-06

## 2021-02-28 RX ADMIN — PANTOPRAZOLE SODIUM 40 MILLIGRAM(S): 20 TABLET, DELAYED RELEASE ORAL at 05:30

## 2021-02-28 RX ADMIN — Medication 15 MILLIGRAM(S): at 22:25

## 2021-02-28 RX ADMIN — ENOXAPARIN SODIUM 40 MILLIGRAM(S): 100 INJECTION SUBCUTANEOUS at 22:24

## 2021-02-28 RX ADMIN — SENNA PLUS 2 TABLET(S): 8.6 TABLET ORAL at 22:24

## 2021-02-28 RX ADMIN — Medication 5 MILLIGRAM(S): at 22:25

## 2021-02-28 NOTE — PROGRESS NOTE ADULT - ATTENDING COMMENTS
#Shortness of breath, due to large L pleural effusion, collapsed L lung; deviated trachea  in setting of nonhogkins lymphoma  s/p thora 2/27, repeat thora today 1.5L out  f/u fluid studies  suspected malignant; f/u cyto  no hypoxia, satting well on ra  pt requesting to establish care with new oncologist for [facility] care/ alternative medicine  she states she had "anaphylatic shock" due to one of chemo agents from prior in [place] #Shortness of breath, due to large L pleural effusion, collapsed L lung; deviated trachea  in setting of nonhogkins lymphoma  s/p thora 2/27, repeat thora today 1.5L out  f/u fluid studies  suspected malignant; f/u cyto  no hypoxia, satting well on ra  pt requesting to establish care with new oncologist for [facility] care/ alternative medicine  she is interested in alternatives to chemo  she states she had "anaphylatic shock" due to one of chemo agents from prior in [place]

## 2021-02-28 NOTE — PROCEDURE NOTE - PROCEDURE DATE TIME, MLM
11/19/17 1600   Group 4   Start Time 1500   Stop Time 1545   Length (min) 45 min   Group Name Activity   Attendance Not present      28-Feb-2021 08:55

## 2021-02-28 NOTE — PROGRESS NOTE ADULT - SUBJECTIVE AND OBJECTIVE BOX
24H events:    Patient is a 40y old Female who presents with a chief complaint of Shortness of breath (27 Feb 2021 05:05)    Primary diagnosis of Pleural effusion       Today is hospital day 1d. This morning patient was seen and examined at bedside, resting comfortably in bed.    No acute or major events overnight.    PAST MEDICAL & SURGICAL HISTORY  Hodgkin lymphoma  Active      SOCIAL HISTORY:  Social History:  nonsmoker, no drug use, no alcohol use (27 Feb 2021 05:05)      ALLERGIES:  No Known Allergies    MEDICATIONS:  STANDING MEDICATIONS  chlorhexidine 4% Liquid 1 Application(s) Topical <User Schedule>  enoxaparin Injectable 40 milliGRAM(s) SubCutaneous at bedtime  pantoprazole    Tablet 40 milliGRAM(s) Oral before breakfast  senna 2 Tablet(s) Oral at bedtime    PRN MEDICATIONS  naproxen 250 milliGRAM(s) Oral every 6 hours PRN  oxycodone    5 mG/acetaminophen 325 mG 1 Tablet(s) Oral every 6 hours PRN    VITALS:   T(F): 96.5  HR: 71  BP: 115/75  RR: 18  SpO2: 96%    PHYSICAL EXAM:  GENERAL: NAD, well-groomed, well-developed  HEAD:  Atraumatic, Normocephalic  EYES: EOMI  NECK: Supple  NERVOUS SYSTEM:  Alert & Oriented X3, non focal   CHEST/LUNG: Clear to auscultation bilaterally; No rales, rhonchi, wheezing, or rubs  HEART: Regular rate and rhythm; No murmurs, rubs, or gallops  ABDOMEN: Soft, Nontender, Nondistended; Bowel sounds present  EXTREMITIES:  2+ Peripheral Pulses, No clubbing, cyanosis, or edema  LYMPH: No lymphadenopathy noted  SKIN: No rashes or lesions  LABS:                        9.8    8.93  )-----------( 385      ( 27 Feb 2021 21:44 )             31.5     02-27    135  |  97<L>  |  10  ----------------------------<  121<H>  4.8   |  24  |  0.7    Ca    9.3      27 Feb 2021 02:05    TPro  6.9  /  Alb  3.6  /  TBili  0.2  /  DBili  x   /  AST  37  /  ALT  20  /  AlkPhos  105  02-27    PT/INR - ( 27 Feb 2021 02:05 )   PT: 16.10 sec;   INR: 1.40 ratio         PTT - ( 27 Feb 2021 02:05 )  PTT:35.5 sec          Culture - Body Fluid with Gram Stain (collected 27 Feb 2021 02:43)  Source: .Body Fluid Pleural Fluid  Gram Stain (27 Feb 2021 13:31):    polymorphonuclear leukocytes seen per low power field    No organisms seen per oil power field    by cytocentrifuge      CARDIAC MARKERS ( 27 Feb 2021 02:05 )  x     / <0.01 ng/mL / x     / x     / x          RADIOLOGY:           24H events:    Patient is a 40y old Female who presents with a chief complaint of Shortness of breath (27 Feb 2021 05:05)    Primary diagnosis of Pleural effusion       Today is hospital day 1d. This morning patient was seen and examined at bedside, resting comfortably in bed.    No acute or major events overnight.  no cp, abd pain, fever  sob unchanged, no cough, orthpnea, pnd    PAST MEDICAL & SURGICAL HISTORY  Hodgkin lymphoma  Active      SOCIAL HISTORY:  Social History:  nonsmoker, no drug use, no alcohol use (27 Feb 2021 05:05)      ALLERGIES:  No Known Allergies    MEDICATIONS:  STANDING MEDICATIONS  chlorhexidine 4% Liquid 1 Application(s) Topical <User Schedule>  enoxaparin Injectable 40 milliGRAM(s) SubCutaneous at bedtime  pantoprazole    Tablet 40 milliGRAM(s) Oral before breakfast  senna 2 Tablet(s) Oral at bedtime    PRN MEDICATIONS  naproxen 250 milliGRAM(s) Oral every 6 hours PRN  oxycodone    5 mG/acetaminophen 325 mG 1 Tablet(s) Oral every 6 hours PRN    VITALS:   T(F): 96.5  HR: 71  BP: 115/75  RR: 18  SpO2: 96%    PHYSICAL EXAM:  GENERAL: NAD, well-groomed, well-developed  HEAD:  Atraumatic, Normocephalic  EYES: EOMI  NECK: Supple  NERVOUS SYSTEM:  Alert & Oriented X3, non focal   CHEST/LUNG: decreased bs L base; No rales, rhonchi, wheezing, or rubs  HEART: Regular rate and rhythm; No murmurs, rubs, or gallops  ABDOMEN: Soft, Nontender, Nondistended; Bowel sounds present  EXTREMITIES:  2+ Peripheral Pulses, No clubbing, cyanosis, or edema  LYMPH: No lymphadenopathy noted  SKIN: No rashes or lesions  LABS:                        9.8    8.93  )-----------( 385      ( 27 Feb 2021 21:44 )             31.5     02-27    135  |  97<L>  |  10  ----------------------------<  121<H>  4.8   |  24  |  0.7    Ca    9.3      27 Feb 2021 02:05    TPro  6.9  /  Alb  3.6  /  TBili  0.2  /  DBili  x   /  AST  37  /  ALT  20  /  AlkPhos  105  02-27    PT/INR - ( 27 Feb 2021 02:05 )   PT: 16.10 sec;   INR: 1.40 ratio         PTT - ( 27 Feb 2021 02:05 )  PTT:35.5 sec          Culture - Body Fluid with Gram Stain (collected 27 Feb 2021 02:43)  Source: .Body Fluid Pleural Fluid  Gram Stain (27 Feb 2021 13:31):    polymorphonuclear leukocytes seen per low power field    No organisms seen per oil power field    by cytocentrifuge      CARDIAC MARKERS ( 27 Feb 2021 02:05 )  x     / <0.01 ng/mL / x     / x     / x          RADIOLOGY:

## 2021-02-28 NOTE — PROGRESS NOTE ADULT - ASSESSMENT
This is a 40 year old F patient with Hx of Hodgkin lymphoma ( Active, S/p one dose of APVD ) admitted for worsening shortness of breath, drenching night sweats and occasional fever as well as  left sided non radiating pleuritic chest pain that increases with cough and deep breaths for the past 3 days.    # Non-Hodgking Lymphhoma  # Left sided pleural effusion  - Diagnosed back in Aug 2020 when she had similar presentation  - Used to follow up with Dr. Schultz but after receiving the first dose of the APVD she developed a "Bad allergic reaction" and switched completely to holistic medicine and has been off any chemotherapy since then.   - Xray which showed complete opacification of the Left lung with deviation of the trachea to the right side, patient underwent a thora in the ED to relieve the SOB but still endorse that she is short of breath and unable to speak in full sentences   - initially admitted to ICU but downgraded yesterday  - As per pulm:  - Keep HOB @ 45 degrees  - Follow up pleural effusion fluid analysis  - Might need repeat thoracocentesis  - Keep SpO2> 94%  - No need for ABX now    Diet: DASH  Activity: IAT  DVT Prophylaxis: Lovenox  CHG Order  Code Status: full  Disposition: from home

## 2021-02-28 NOTE — CONSULT NOTE ADULT - ASSESSMENT
pleural effusion  most likely malignant   h/o newly diagnosed hodgkins disease     will review pathology ,and all imaging   discussed with pt about out  plan for treatment to discuss treatment options   pt agrees     zach cortés MD   [phone]

## 2021-02-28 NOTE — CONSULT NOTE ADULT - SUBJECTIVE AND OBJECTIVE BOX
Patient is a 40y old  Female who presents with a chief complaint of Shortness of breath (28 Feb 2021 06:59)      HPI:  This is a 40 year old F patient with Hx of Hodgkin lymphoma ( Active, S/p one dose of APVD ) presented to the ED for shortness of breath of 3 days duration. Patient was doing her baseline until 3 days prior to admission when she started to expeience progressive shortness of breath that started as exertional shortness of breath and progressed to being nonexertional shortness of breath. Patient also endorsed drenching night sweats and occasional fever. Patient also complained of left sided non radiating pleuritic chest pain that increases with cough and deep breaths. Patient denied any exertional chest pain, palpitation, dizziness or any other symptoms.     -Patient was diagnosed with Hodgkin lymphoma back in Aug 2020 when she had similar presentation, at that time chest xray showed pleural effusion that was relieved by a thora, patient also had a biopsy of the lymph nodes and a PET scan which are found in the chart. Patient used to follow up with Dr. Schultz but after receiving the first dose of the APVD she developed a "Bad allergic reaction" and switched completely to holistic medicine and has been off any chemotherapy since then.   In the ED patient had an Xray which showed complete opacification of the Left lung with deviation of the trachea to the right side, patient underwent a thora in the ED to relieve the SOB but still endorse that she is short of breath and unable to speak in full sentences     In the ED Vital Signs Last 24 Hrs  T(C): 37.1 (27 Feb 2021 00:48), Max: 37.1 (27 Feb 2021 00:48)  T(F): 98.8 (27 Feb 2021 00:48), Max: 98.8 (27 Feb 2021 00:48)  HR: 97 (27 Feb 2021 00:55) (97 - 120)  BP: 114/72 (27 Feb 2021 00:48) (114/72 - 114/72)  RR: 18 (27 Feb 2021 00:48) (18 - 18)  SpO2: 98% (27 Feb 2021 00:48) (98% - 98%) (27 Feb 2021 05:05)      PAST MEDICAL & SURGICAL HISTORY:  Hodgkin lymphoma  Active      SOCIAL HX:   Smoking   never smoker                      ETOH     denies                       Other denies illicit drug use, from home, ambulates independently, denies travel outside country in past 6 months, works as  for [employer]    FAMILY HISTORY:  .  No cardiovascular or pulmonary family history     REVIEW OF SYSTEMS:    All ROS are negative except per HPI       Allergies    No Known Allergies    Intolerances          PHYSICAL EXAM  Vital Signs Last 24 Hrs  T(C): 35.8 (28 Feb 2021 04:35), Max: 37.1 (27 Feb 2021 20:51)  T(F): 96.5 (28 Feb 2021 04:35), Max: 98.7 (27 Feb 2021 20:51)  HR: 71 (28 Feb 2021 04:35) (71 - 80)  BP: 115/75 (28 Feb 2021 04:35) (93/55 - 115/75)  RR: 18 (28 Feb 2021 04:35) (18 - 19)  SpO2: 96% (27 Feb 2021 17:03) (96% - 96%)    CONSTITUTIONAL:  Well nourished.  NAD    ENT:   Airway patent,   No thrush    EYES:   Clear bilaterally,   pupils equal,   round and reactive to light.    CARDIAC:   Normal rate,   regular rhythm.    no edema    RESPIRATORY:   RA  Inspection- normal chest wall symmetry  Palpation- normal chest wall expansion  Auscultation- dec BS on left side  Bedside Lung US- large left-sided pleural effusion with compressive atelectasis  No wheezing   Not tachypneic,  No use of accessory muscles    GASTROINTESTINAL:  Abdomen soft, non-tender,   No guarding,   Positive BS    MUSCULOSKELETAL:   Range of motion is not limited,  No clubbing, cyanosis    NEUROLOGICAL:   AOx4  Follows commands  Moves all extremities  Alert and oriented   No motor deficits.    SKIN:   Skin normal color for race,   No evidence of rash.          LABS:                          11.4   6.99  )-----------( 443      ( 28 Feb 2021 07:31 )             37.9                                               02-28    140  |  99  |  10  ----------------------------<  82  4.3   |  26  |  0.6<L>    Ca    9.2      28 Feb 2021 07:31    TPro  6.5  /  Alb  3.6  /  TBili  <0.2  /  DBili  x   /  AST  16  /  ALT  17  /  AlkPhos  95  02-28      PT/INR - ( 28 Feb 2021 07:31 )   PT: 15.60 sec;   INR: 1.36 ratio         PTT - ( 28 Feb 2021 07:31 )  PTT:36.0 sec                                           CARDIAC MARKERS ( 27 Feb 2021 02:05 )  x     / <0.01 ng/mL / x     / x     / x                                                LIVER FUNCTIONS - ( 28 Feb 2021 07:31 )  Alb: 3.6 g/dL / Pro: 6.5 g/dL / ALK PHOS: 95 U/L / ALT: 17 U/L / AST: 16 U/L / GGT: x                                                  Culture - Body Fluid with Gram Stain (collected 27 Feb 2021 02:43)  Source: .Body Fluid Pleural Fluid  Gram Stain (27 Feb 2021 13:31):    polymorphonuclear leukocytes seen per low power field    No organisms seen per oil power field    by cytocentrifuge                                                    MEDICATIONS  (STANDING):  chlorhexidine 4% Liquid 1 Application(s) Topical <User Schedule>  enoxaparin Injectable 40 milliGRAM(s) SubCutaneous at bedtime  pantoprazole    Tablet 40 milliGRAM(s) Oral before breakfast  senna 2 Tablet(s) Oral at bedtime    MEDICATIONS  (PRN):  naproxen 250 milliGRAM(s) Oral every 6 hours PRN Moderate Pain (4 - 6)  oxycodone    5 mG/acetaminophen 325 mG 1 Tablet(s) Oral every 6 hours PRN Moderate Pain (4 - 6)      X-Rays reviewed:    CXR interpreted by me: large left sided pleural effusion
pt awake and alert   came to er for sob   vitals stable   giving h/o hodgkins lymphoma   sp ABVD CHEMO says had sev anaphylactic reaction from chemo   not willing to cnt any treatement   lt sided pleural effusion 1500 cc was removed   feels better after that   also going to many different doctors in [place]   also says she has low iron and needs iron infusion   was told by some doctor

## 2021-02-28 NOTE — CONSULT NOTE ADULT - ASSESSMENT
Impression  Large left pleural effusion s/p thora in ER (02/27) and repeat thora 02/28  HO Hodgkin lymphoma (s/p 1 dose ABVD)    Recommendations  S/p diagnostic and therapeutic thoracentesis, 1500 cc removed  FU pleural studies  HOB at 45  Aspiration precautions  DVT ppx Impression  Large left pleural effusion s/p thora in ER (02/27) and repeat thora 02/28  HO Hodgkin lymphoma (s/p 1 dose ABVD)    Recommendations  S/p diagnostic and therapeutic thoracentesis, 1500 cc removed  FU pleural studies  HOB at 45  Aspiration precautions  DVT ppx  FU with hem onc

## 2021-03-01 ENCOUNTER — TRANSCRIPTION ENCOUNTER (OUTPATIENT)
Age: 41
End: 2021-03-01

## 2021-03-01 LAB
ALBUMIN FLD-MCNC: 2.7 G/DL — SIGNIFICANT CHANGE UP
ALBUMIN SERPL ELPH-MCNC: 2.7 G/DL — LOW (ref 3.5–5.2)
ALP SERPL-CCNC: 76 U/L — SIGNIFICANT CHANGE UP (ref 30–115)
ALT FLD-CCNC: 14 U/L — SIGNIFICANT CHANGE UP (ref 0–41)
ANION GAP SERPL CALC-SCNC: 10 MMOL/L — SIGNIFICANT CHANGE UP (ref 7–14)
AST SERPL-CCNC: 11 U/L — SIGNIFICANT CHANGE UP (ref 0–41)
BILIRUB SERPL-MCNC: <0.2 MG/DL — SIGNIFICANT CHANGE UP (ref 0.2–1.2)
BUN SERPL-MCNC: 13 MG/DL — SIGNIFICANT CHANGE UP (ref 10–20)
CALCIUM SERPL-MCNC: 8.8 MG/DL — SIGNIFICANT CHANGE UP (ref 8.5–10.1)
CHLORIDE SERPL-SCNC: 104 MMOL/L — SIGNIFICANT CHANGE UP (ref 98–110)
CO2 SERPL-SCNC: 27 MMOL/L — SIGNIFICANT CHANGE UP (ref 17–32)
CREAT SERPL-MCNC: 0.6 MG/DL — LOW (ref 0.7–1.5)
FERRITIN SERPL-MCNC: 203 NG/ML — HIGH (ref 15–150)
GLUCOSE FLD-MCNC: 117 MG/DL — SIGNIFICANT CHANGE UP
GLUCOSE SERPL-MCNC: 95 MG/DL — SIGNIFICANT CHANGE UP (ref 70–99)
HCT VFR BLD CALC: 32.9 % — LOW (ref 37–47)
HGB BLD-MCNC: 10.4 G/DL — LOW (ref 12–16)
LDH SERPL L TO P-CCNC: 103 U/L — SIGNIFICANT CHANGE UP
MCHC RBC-ENTMCNC: 24.6 PG — LOW (ref 27–31)
MCHC RBC-ENTMCNC: 31.6 G/DL — LOW (ref 32–37)
MCV RBC AUTO: 78 FL — LOW (ref 81–99)
NON-GYNECOLOGICAL CYTOLOGY STUDY: SIGNIFICANT CHANGE UP
NRBC # BLD: 0 /100 WBCS — SIGNIFICANT CHANGE UP (ref 0–0)
PH FLD: 7.8 — SIGNIFICANT CHANGE UP
PLATELET # BLD AUTO: 379 K/UL — SIGNIFICANT CHANGE UP (ref 130–400)
POTASSIUM SERPL-MCNC: 5 MMOL/L — SIGNIFICANT CHANGE UP (ref 3.5–5)
POTASSIUM SERPL-SCNC: 5 MMOL/L — SIGNIFICANT CHANGE UP (ref 3.5–5)
PROT FLD-MCNC: 4.8 G/DL — SIGNIFICANT CHANGE UP
PROT SERPL-MCNC: 5.6 G/DL — LOW (ref 6–8)
RBC # BLD: 4.22 M/UL — SIGNIFICANT CHANGE UP (ref 4.2–5.4)
RBC # FLD: 14.9 % — HIGH (ref 11.5–14.5)
SODIUM SERPL-SCNC: 141 MMOL/L — SIGNIFICANT CHANGE UP (ref 135–146)
WBC # BLD: 7.6 K/UL — SIGNIFICANT CHANGE UP (ref 4.8–10.8)
WBC # FLD AUTO: 7.6 K/UL — SIGNIFICANT CHANGE UP (ref 4.8–10.8)

## 2021-03-01 PROCEDURE — 99232 SBSQ HOSP IP/OBS MODERATE 35: CPT

## 2021-03-01 PROCEDURE — 99233 SBSQ HOSP IP/OBS HIGH 50: CPT

## 2021-03-01 PROCEDURE — 71045 X-RAY EXAM CHEST 1 VIEW: CPT | Mod: 26

## 2021-03-01 RX ORDER — TRAMADOL HYDROCHLORIDE 50 MG/1
25 TABLET ORAL EVERY 6 HOURS
Refills: 0 | Status: DISCONTINUED | OUTPATIENT
Start: 2021-03-01 | End: 2021-03-05

## 2021-03-01 RX ORDER — ONDANSETRON 8 MG/1
4 TABLET, FILM COATED ORAL EVERY 6 HOURS
Refills: 0 | Status: DISCONTINUED | OUTPATIENT
Start: 2021-03-01 | End: 2021-03-06

## 2021-03-01 RX ORDER — TRAMADOL HYDROCHLORIDE 50 MG/1
25 TABLET ORAL EVERY 6 HOURS
Refills: 0 | Status: DISCONTINUED | OUTPATIENT
Start: 2021-03-01 | End: 2021-03-01

## 2021-03-01 RX ORDER — DIPHENHYDRAMINE HCL 50 MG
25 CAPSULE ORAL ONCE
Refills: 0 | Status: COMPLETED | OUTPATIENT
Start: 2021-03-01 | End: 2021-03-01

## 2021-03-01 RX ORDER — TRAMADOL HYDROCHLORIDE 50 MG/1
25 TABLET ORAL ONCE
Refills: 0 | Status: DISCONTINUED | OUTPATIENT
Start: 2021-03-01 | End: 2021-03-01

## 2021-03-01 RX ORDER — GUAIFENESIN/DEXTROMETHORPHAN 600MG-30MG
5 TABLET, EXTENDED RELEASE 12 HR ORAL EVERY 6 HOURS
Refills: 0 | Status: DISCONTINUED | OUTPATIENT
Start: 2021-03-01 | End: 2021-03-06

## 2021-03-01 RX ORDER — IRON SUCROSE 20 MG/ML
100 INJECTION, SOLUTION INTRAVENOUS EVERY 24 HOURS
Refills: 0 | Status: COMPLETED | OUTPATIENT
Start: 2021-03-01 | End: 2021-03-03

## 2021-03-01 RX ORDER — LORATADINE 10 MG/1
10 TABLET ORAL DAILY
Refills: 0 | Status: DISCONTINUED | OUTPATIENT
Start: 2021-03-01 | End: 2021-03-01

## 2021-03-01 RX ADMIN — Medication 25 MILLIGRAM(S): at 21:40

## 2021-03-01 RX ADMIN — PANTOPRAZOLE SODIUM 40 MILLIGRAM(S): 20 TABLET, DELAYED RELEASE ORAL at 05:16

## 2021-03-01 RX ADMIN — ONDANSETRON 4 MILLIGRAM(S): 8 TABLET, FILM COATED ORAL at 13:42

## 2021-03-01 RX ADMIN — TRAMADOL HYDROCHLORIDE 25 MILLIGRAM(S): 50 TABLET ORAL at 19:05

## 2021-03-01 RX ADMIN — IRON SUCROSE 210 MILLIGRAM(S): 20 INJECTION, SOLUTION INTRAVENOUS at 09:15

## 2021-03-01 RX ADMIN — TRAMADOL HYDROCHLORIDE 25 MILLIGRAM(S): 50 TABLET ORAL at 17:49

## 2021-03-01 RX ADMIN — LORATADINE 10 MILLIGRAM(S): 10 TABLET ORAL at 19:05

## 2021-03-01 RX ADMIN — Medication 5 MILLILITER(S): at 15:20

## 2021-03-01 NOTE — MEDICAL STUDENT PROGRESS NOTE(EDUCATION) - SUBJECTIVE AND OBJECTIVE BOX
Subjective:    39 y/o female with a past medical history of Hodgkin's Lymphoma which is currently active, s/p one dose of ABVD (stopped after one dose due to a bad allergic reaction, which she describes as anaphylactic) presenting for progressively worsening SOB, from SOB on exertion to SOB at rest. Pt found to have complete opacification of the left lung with deviation of the trachea to the right side. Pt underwent a thoracocentesis on 2/27/21 in the ED that meghna 570 mL and the next day, Pt had a repeat thoracocentesis that meghna 1500mL on 2/28/21.    This is hospital Day 2. She notes that she is still experiencing a dry, 'sweaky" cough. States that her SOB has improved, but notes that she still has mild orthopnea at night and mild SOB at rest. In association, she notes that she has night sweats. She is on room air. In addition, she notes that she has a left swollen, painful breast, no erythema, no discharge. Notes that she also has mild abd pain in the right upper quadrant and epigastric region. Denies fever, chills, nausea, vomiting, diarrhea.    REVIEW OF SYSTEMS    General: + night sweats, Denies fever, chills    Skin/Breast: +swollen left breast, pain on left breast   	  ENMT: Denies neck pain,     Respiratory and Thorax: +SOB, +orthopnea, +dry cough, Denies wheezing, CP   	  Cardiovascular: Denies CP, palpitations    Gastrointestinal:	+LUQ and epigastric pain, Denies n/v/d 	  	    Objective:     LOS: 2d    VITALS:   T(C): 36.3 (03-01-21 @ 04:35), Max: 36.3 (03-01-21 @ 04:35)  HR: 83 (03-01-21 @ 04:35) (83 - 97)  BP: 98/59 (03-01-21 @ 04:35) (98/59 - 116/73)  RR: 18 (03-01-21 @ 04:35) (18 - 20)  SpO2: --    Labs:                     10.4   7.60  )-----------( 379      ( 01 Mar 2021 06:46 )             32.9   03-01    141  |  104  |  13  ----------------------------<  95  5.0   |  27  |  0.6<L>    Ca    8.8      01 Mar 2021 06:46    TPro  5.6<L>  /  Alb  2.7<L>  /  TBili  <0.2  /  DBili  x   /  AST  11  /  ALT  14  /  AlkPhos  76  03-01    PT/INR - ( 28 Feb 2021 07:31 )   PT: 15.60 sec;   INR: 1.36 ratio       PTT - ( 28 Feb 2021 07:31 )  PTT:36.0 sec    Radiology:    EXAM:  CT ANGIO CHEST PE PROTOCOL IC            PROCEDURE DATE:  02/27/2021        INTERPRETATION:  CLINICAL STATEMENT: Pulmonary embolism. Hodgkin's lymphoma    TECHNIQUE: Multislice helical sections were obtained from the thoracic inlet to the lung bases during rapid administration of 80 mL Optiray 320 intravenous contrast using a CTA protocol. Thin sections were reconstructed through the pulmonary vasculature. Coronal, sagittal and MIP reformatted images are also submitted.    COMPARISONCT: None.    OTHER STUDIES USED FOR CORRELATION: None.      FINDINGS:    PULMONARY EMBOLUS: No central or segmental pulmonary embolus.    LUNGS, PLEURA, AIRWAYS: Patent central airway. Large left pleural effusion occupying the entire left thoracic cavity and collapsing the left lung. Small to moderate right pleural effusion. Trachea is shifted to the right.    THORACIC NODES: Bilateral axillary and hilar lymphadenopathy. Anterior mediastinal lymphadenopathy    MEDIASTINUM/GREAT VESSELS: Mediastinum is shifted to the right Normal heart size. Small pericardial effusion. Main pulmonary artery and thoracic aorta are of normal caliber.    VISUALIZED UPPER ABDOMEN: Unremarkable.    BONES/SOFT TISSUES: Multilevel degenerative changes of the spine.      IMPRESSION:      No CT evidence of acute pulmonary embolus.    Large left pleural effusion occupying the entire left thoracic cavity and collapsed in the left lung. Associated rightward shift of the mediastinum and trachea    Small to moderate right pleural effusion.    Bilateral axillary as well as mediastinal lymphadenopathy      EXAM:  XR CHEST PORTABLE IMMED 1V            PROCEDURE DATE:  02/28/2021        INTERPRETATION:  Clinical History / Reason for exam: Post thoracentesis    Comparison : Chest radiograph February 28, 2021.    Technique/Positioning: Single AP viewof the chest.    Findings:    Support devices: None.    Cardiac/mediastinum/hilum: Partially obscured.    Lung parenchyma/Pleura: Post left thoracentesis with decreased left pleural effusion. Suggestion of small left apical pneumothorax.    Skeleton/soft tissues: Unchanged.    Impression:    Post left thoracentesis with decreased left pleural effusion.    Suggestion of small left apical pneumothorax. Continued surveillance recommended.      Physical Exam:   GENERAL: NAD, lying in bed comfortably  HEAD:  Atraumatic, Normocephalic  EYES: EOMI, PERRLA, conjunctiva and sclera clear  ENT: Moist mucous membranes  NECK: Supple, bilateral cervical anterior and posterior lymphadenopathy, large lymph node palpated on right anterior neck   Breast: swollen left breast, painful to palpation, no erythema, no discharge  CHEST/LUNG: Crackles auscultated left lung field; No wheezing, or rubs. Unlabored respirations  HEART: Regular rate and rhythm; No murmurs, rubs, or gallops  ABDOMEN: BSx4; Soft, mild tenderness to left upper quadrant and epigastric region, nondistended  EXTREMITIES:  2+ Peripheral Pulses, brisk capillary refill. No clubbing, cyanosis, or edema  NERVOUS SYSTEM:  A&Ox3, no focal deficits   SKIN: No rashes or lesions Subjective:    39 y/o female with a past medical history of Hodgkin's Lymphoma which is currently active, s/p one dose of ABVD (stopped after one dose due to a bad allergic reaction, which she describes as anaphylactic) presenting for progressively worsening SOB, from SOB on exertion to SOB at rest. Pt found to have complete opacification of the left lung with deviation of the trachea to the right side. Pt underwent a thoracocentesis on 2/27/21 in the ED that meghna 570 mL and the next day, Pt had a repeat thoracocentesis that meghna 1500mL on 2/28/21.    This is hospital Day 2. She notes that she is still experiencing a dry, 'sweaky" cough. States that her SOB has improved, but notes that she still has mild orthopnea at night and mild SOB at rest. In association, she notes that she has night sweats. She is on room air. In addition, she notes that she has a left swollen, painful breast, no erythema, no discharge. Notes that she also has mild abd pain in the right upper quadrant and epigastric region. Denies fever, chills, nausea, vomiting, diarrhea.    REVIEW OF SYSTEMS    General: + night sweats, Denies fever, chills    Skin/Breast: +swollen left breast, pain on left breast   	  ENMT: Denies neck pain,     Respiratory and Thorax: +SOB, +orthopnea, +dry cough, Denies wheezing, CP   	  Cardiovascular: Denies CP, palpitations    Gastrointestinal:	+LUQ and epigastric pain, Denies n/v/d 	  	    Objective:     LOS: 2d    VITALS:   T(C): 36.3 (03-01-21 @ 04:35), Max: 36.3 (03-01-21 @ 04:35)  HR: 83 (03-01-21 @ 04:35) (83 - 97)  BP: 98/59 (03-01-21 @ 04:35) (98/59 - 116/73)  RR: 18 (03-01-21 @ 04:35) (18 - 20)  SpO2: --    Labs:                     10.4   7.60  )-----------( 379      ( 01 Mar 2021 06:46 )             32.9   03-01    141  |  104  |  13  ----------------------------<  95  5.0   |  27  |  0.6<L>    Ca    8.8      01 Mar 2021 06:46    TPro  5.6<L>  /  Alb  2.7<L>  /  TBili  <0.2  /  DBili  x   /  AST  11  /  ALT  14  /  AlkPhos  76  03-01    PT/INR - ( 28 Feb 2021 07:31 )   PT: 15.60 sec;   INR: 1.36 ratio       PTT - ( 28 Feb 2021 07:31 )  PTT:36.0 sec    Radiology:    EXAM:  CT ANGIO CHEST PE PROTOCOL IC            PROCEDURE DATE:  02/27/2021        INTERPRETATION:  CLINICAL STATEMENT: Pulmonary embolism. Hodgkin's lymphoma    TECHNIQUE: Multislice helical sections were obtained from the thoracic inlet to the lung bases during rapid administration of 80 mL Optiray 320 intravenous contrast using a CTA protocol. Thin sections were reconstructed through the pulmonary vasculature. Coronal, sagittal and MIP reformatted images are also submitted.    COMPARISONCT: None.    OTHER STUDIES USED FOR CORRELATION: None.      FINDINGS:    PULMONARY EMBOLUS: No central or segmental pulmonary embolus.    LUNGS, PLEURA, AIRWAYS: Patent central airway. Large left pleural effusion occupying the entire left thoracic cavity and collapsing the left lung. Small to moderate right pleural effusion. Trachea is shifted to the right.    THORACIC NODES: Bilateral axillary and hilar lymphadenopathy. Anterior mediastinal lymphadenopathy    MEDIASTINUM/GREAT VESSELS: Mediastinum is shifted to the right Normal heart size. Small pericardial effusion. Main pulmonary artery and thoracic aorta are of normal caliber.    VISUALIZED UPPER ABDOMEN: Unremarkable.    BONES/SOFT TISSUES: Multilevel degenerative changes of the spine.      IMPRESSION:      No CT evidence of acute pulmonary embolus.    Large left pleural effusion occupying the entire left thoracic cavity and collapsed in the left lung. Associated rightward shift of the mediastinum and trachea    Small to moderate right pleural effusion.    Bilateral axillary as well as mediastinal lymphadenopathy      EXAM:  XR CHEST PORTABLE IMMED 1V            PROCEDURE DATE:  02/28/2021        INTERPRETATION:  Clinical History / Reason for exam: Post thoracentesis    Comparison : Chest radiograph February 28, 2021.    Technique/Positioning: Single AP viewof the chest.    Findings:    Support devices: None.    Cardiac/mediastinum/hilum: Partially obscured.    Lung parenchyma/Pleura: Post left thoracentesis with decreased left pleural effusion. Suggestion of small left apical pneumothorax.    Skeleton/soft tissues: Unchanged.    Impression:    Post left thoracentesis with decreased left pleural effusion.    Suggestion of small left apical pneumothorax. Continued surveillance recommended.      Physical Exam:   GENERAL: NAD, lying in bed comfortably  HEAD:  Atraumatic, Normocephalic  EYES: EOMI, PERRLA, conjunctiva and sclera clear  ENT: Moist mucous membranes  NECK: Supple, bilateral cervical anterior and posterior lymphadenopathy, large lymph node palpated on right anterior neck   Breast: swollen left breast, painful to palpation, no erythema, no discharge  CHEST/LUNG: Crackles auscultated left lung field; No wheezing, or rubs. Unlabored respirations  HEART: Regular rate and rhythm; No murmurs, rubs, or gallops  ABDOMEN: BSx4; Soft, nontender, nondistended  EXTREMITIES:  2+ Peripheral Pulses, brisk capillary refill. No clubbing, cyanosis, or edema  NERVOUS SYSTEM:  A&Ox3, no focal deficits   SKIN: No rashes or lesions

## 2021-03-01 NOTE — PROGRESS NOTE ADULT - SUBJECTIVE AND OBJECTIVE BOX
recurrent pleural effusion   admitted for SOB   SP THORACOCENTESIS twice at this admission  feels better

## 2021-03-01 NOTE — PROGRESS NOTE ADULT - ASSESSMENT
hodgkins diseasesp 1 cycle of ABVD   had sev reaction   need further treatment but looking for alternatives   immunotherapy is also approved for 2nd line   plan review path   review staging workup   iron workup reviewed serum iron and saturation is low   pt asking for iron infusion ,will discuss with pcp    zach cortés MD  [phone]

## 2021-03-01 NOTE — DISCHARGE NOTE PROVIDER - PROVIDER TOKENS
PROVIDER:[TOKEN:[[id number]],FOLLOWUP:[2 weeks]],PROVIDER:[TOKEN:[[id number]],FOLLOWUP:[1 week]]

## 2021-03-01 NOTE — DISCHARGE NOTE PROVIDER - CARE PROVIDER_API CALL
Sebastian Fitch)  Critical Care Medicine; Pulmonary Disease; Sleep Medicine  [address] [place] [address]  Phone: [phone]  Fax: [phone]  Follow Up Time: 2 weeks    Sydney Childress  INTERNAL MEDICINE  [address] [place] [id number]  Phone: [phone]  Fax: [phone]  Follow Up Time: 1 week

## 2021-03-01 NOTE — DISCHARGE NOTE PROVIDER - NSDCFUADDINST_GEN_ALL_CORE_FT
You will be taught how to change your pleux supplies. It is important that you do not do any extraneous activity with the cathter in place.

## 2021-03-01 NOTE — PROGRESS NOTE ADULT - ASSESSMENT
Impression  Large exudative left pleural effusion s/p thora in ER (02/27) and repeat thora 02/28  HO Hodgkin lymphoma (s/p 1 dose ABVD)    Recommendations  FU pleural cytology  HOB at 45  Aspiration precautions  DVT ppx  FU with hem onc  OP pulm FU Impression  Large exudative left pleural effusion s/p thora in ER (02/27) and repeat thora 02/28  HO Hodgkin lymphoma (s/p 1 dose ABVD)    Recommendations  FU PFA   HOB at 45  Aspiration precautions  DVT ppx  FU with hem onc  OP pulm FU  Will likely need PleurX

## 2021-03-01 NOTE — DISCHARGE NOTE PROVIDER - NSDCMRMEDTOKEN_GEN_ALL_CORE_FT
melatonin 5 mg oral tablet: 1 tab(s) orally once a day (at bedtime)  naproxen 250 mg oral tablet: 1 tab(s) orally every 12 hours, As needed, Mild Pain (1 - 3)  pantoprazole 40 mg oral delayed release tablet: 1 tab(s) orally once a day (before a meal)  senna oral tablet: 2 tab(s) orally once a day (at bedtime)  simethicone 80 mg oral tablet, chewable: 1 tab(s) orally every 6 hours   ketorolac 10 mg oral tablet: 1 tab(s) orally every 6 hours PRN For pain MDD:40  melatonin 5 mg oral tablet: 1 tab(s) orally once a day (at bedtime)  pantoprazole 40 mg oral delayed release tablet: 1 tab(s) orally once a day (before a meal)  senna oral tablet: 2 tab(s) orally once a day (at bedtime)  simethicone 80 mg oral tablet, chewable: 1 tab(s) orally every 6 hours

## 2021-03-01 NOTE — PROGRESS NOTE ADULT - ASSESSMENT
This is a 40 year old F patient with Hx of Hodgkin lymphoma ( Active, S/p one dose of ABVD ) p/w SOB, night sweats, fevers and L sided non radiating pleuritic chest pain. Found to have a large L pleural effusion s/p thoracentesis x2.          #Hodgkin Lymphoma  # Left sided pleural effusion  - Diagnosed 8/2020  - Used to follow up with Dr. Schultz but after receiving the first dose of the APVD she developed a "Bad allergic reaction" and switched completely to holistic medicine and has been off any chemotherapy since then.   - Xray which showed complete opacification of the Left lung with deviation of the trachea to the right side, patient underwent a thora in the ED to relieve the SOB but still endorse that she is short of breath and unable to speak in full sentences   - initially admitted to ICU but downgraded yesterday  - As per pulm:  - Keep HOB @ 45 degrees  - Follow up pleural effusion fluid analysis  - Might need repeat thoracocentesis  - Keep SpO2> 94%  - No need for ABX now    Diet: DASH  Activity: IAT  DVT Prophylaxis: Lovenox  CHG Order  Code Status: full  Disposition: from home    #Shortness of breath, due to large L pleural effusion, collapsed L lung; deviated trachea  in setting of nonhogkins lymphoma  s/p thora 2/27, repeat thora today 1.5L out  f/u fluid studies  suspected malignant; f/u cyto  no hypoxia, satting well on ra  pt requesting to establish care with new oncologist for [facility] care/ alternative medicine  she is interested in alternatives to chemo  she states she had "anaphylatic shock" due to one of chemo agents from prior in [place].    This is a 40 year old F patient with Hx of Hodgkin lymphoma ( Active, S/p one dose of ABVD ) p/w SOB, night sweats, fevers and L sided non radiating pleuritic chest pain. Found to have a large L pleural effusion s/p thoracentesis x2.        #Hodgkin Lymphoma  # Left sided pleural effusion  - Diagnosed 8/2020  - s/p 1x treatment w/ ABVD w/ reported anaphylactic reaction   - Xray on adm w/ complete opacification of left lung  - s/p thora x2- cytology pending, gram stain negative x 2  - Pulm on consult: OP follow up, will likely need pleur-x  - Heme Onc on consult: plan for different therapy regimen, appt made for friday   - Tramadol for pain control     #Iron Deficiency Anemia   - s/p Venofer x1 today       DVT ppx: lovenox       Dispo: plan for dc tomorrow if respiratory function is stable       Alana Mendenhall, DO

## 2021-03-01 NOTE — DISCHARGE NOTE PROVIDER - NSDCCPCAREPLAN_GEN_ALL_CORE_FT
PRINCIPAL DISCHARGE DIAGNOSIS  Diagnosis: Pleural effusion  Assessment and Plan of Treatment: You were found on admsision to have a Left pleural effusion. You were tapped twice, once on 2/27 and again on 2/28. Your fluid anaylsis points to an exudative effusion secondary to your hodgkins lymphoma. Please follow up outpatient with the pulmonologist      SECONDARY DISCHARGE DIAGNOSES  Diagnosis: Hodgkin lymphoma  Assessment and Plan of Treatment: You have a history of Hodgkins Lymphoma that was previously treated with APVD. You should follow up with Dr. Childress outpatient to start your second line cancer treatment.    Diagnosis: Abdominal pain  Assessment and Plan of Treatment: You were found to have abdominal pain on admission. You have previously been treated for H Pylori. You had your stool antigen checked. You should follow up outpatieny.    Diagnosis: Pneumothorax  Assessment and Plan of Treatment: You were found to have a small pneumothorax. You should follow up outpatient with pulmonology to monitor your symptoms.  Please return to the ED if you have severe chest or abdominal pain, fever, shortness of breath, or lightheadedness.     PRINCIPAL DISCHARGE DIAGNOSIS  Diagnosis: Pleural effusion  Assessment and Plan of Treatment: You were found on admsision to have a Left pleural effusion. You were tapped twice, once on 2/27 and again on 2/28. Your fluid anaylsis points to an exudative effusion secondary to your hodgkins lymphoma. Chest xray on 3/3 concerning for fluid reaccumilation so a pleurix was placed. Please follow up outpatient with the pulmonologist      SECONDARY DISCHARGE DIAGNOSES  Diagnosis: Hodgkin lymphoma  Assessment and Plan of Treatment: You have a history of Hodgkins Lymphoma that was previously treated with APVD. You should follow up with Dr. Childress outpatient to start your second line cancer treatment.    Diagnosis: Abdominal pain  Assessment and Plan of Treatment: You were found to have abdominal pain on admission. You have previously been treated for H Pylori. You had your stool antigen checked. You should follow up outpatieny.    Diagnosis: Pneumothorax  Assessment and Plan of Treatment: You were found to have a small pneumothorax. You should follow up outpatient with pulmonology to monitor your symptoms.  Please return to the ED if you have severe chest or abdominal pain, fever, shortness of breath, or lightheadedness.     PRINCIPAL DISCHARGE DIAGNOSIS  Diagnosis: Pleural effusion  Assessment and Plan of Treatment: You were found on admsision to have a Left pleural effusion. You were tapped twice, once on 2/27 and again on 2/28. Your fluid anaylsis points to an exudative effusion secondary to your hodgkins lymphoma. Chest xray on 3/3 concerning for fluid reaccumilation so a pleurix was placed on 3/5. Please follow up outpatient with the pulmonologist      SECONDARY DISCHARGE DIAGNOSES  Diagnosis: Hodgkin lymphoma  Assessment and Plan of Treatment: You have a history of Hodgkins Lymphoma that was previously treated with APVD. You should follow up with Dr. Childress outpatient to start your second line cancer treatment.    Diagnosis: Abdominal pain  Assessment and Plan of Treatment: You were found to have abdominal pain on admission. You have previously been treated for H Pylori. You had your stool antigen checked which was negative. Pain is likely secondary to your cancer.    Diagnosis: Pneumothorax  Assessment and Plan of Treatment: You were found to have a small pneumothorax which should resolve with the pleurix catheter. You should follow up outpatient with pulmonology to monitor your symptoms.  Please return to the ED if you have severe chest or abdominal pain, fever, shortness of breath, or lightheadedness.

## 2021-03-01 NOTE — PROGRESS NOTE ADULT - SUBJECTIVE AND OBJECTIVE BOX
SALOMÓNCLARA  40y, Female  Allergy: No Known Allergies    Hospital Day: 2d    Patient seen and examined earlier today. States that she has some back pain after the thoracentesis, otherwise feeling well. Agreeable to other forms of therapy for her dogkins lymphoma but is not willing to do ABVD again due reported anaphylactic reaction. Patient also reporting allergic reaction to percocet, and would like a different pain medication.     PMH/PSH:  PAST MEDICAL & SURGICAL HISTORY:  Hodgkin lymphoma  Active        LAST 24-Hr EVENTS:    VITALS:  T(F): 97.3 (03-01-21 @ 04:35), Max: 97.3 (03-01-21 @ 04:35)  HR: 83 (03-01-21 @ 04:35)  BP: 98/59 (03-01-21 @ 04:35) (98/59 - 116/73)  RR: 18 (03-01-21 @ 04:35)  SpO2: --        TESTS & MEASUREMENTS:  Weight (Kg):   BMI (kg/m2): 24 (02-27)                          10.4   7.60  )-----------( 379      ( 01 Mar 2021 06:46 )             32.9     PT/INR - ( 28 Feb 2021 07:31 )   PT: 15.60 sec;   INR: 1.36 ratio         PTT - ( 28 Feb 2021 07:31 )  PTT:36.0 sec  03-01    141  |  104  |  13  ----------------------------<  95  5.0   |  27  |  0.6<L>    Ca    8.8      01 Mar 2021 06:46    TPro  5.6<L>  /  Alb  2.7<L>  /  TBili  <0.2  /  DBili  x   /  AST  11  /  ALT  14  /  AlkPhos  76  03-01    LIVER FUNCTIONS - ( 01 Mar 2021 06:46 )  Alb: 2.7 g/dL / Pro: 5.6 g/dL / ALK PHOS: 76 U/L / ALT: 14 U/L / AST: 11 U/L / GGT: x                 Culture - Fungal, Body Fluid (collected 02-28-21 @ 09:20)  Source: .Body Fluid Pleural Fluid  Preliminary Report (03-01-21 @ 09:00):    Testing in progress    Culture - Body Fluid with Gram Stain (collected 02-28-21 @ 09:20)  Source: .Body Fluid Pleural Fluid  Gram Stain (02-28-21 @ 22:50):    polymorphonuclear leukocytes seen    No organisms seen    by cytocentrifuge    Culture - Fungal, Body Fluid (collected 02-27-21 @ 02:43)  Source: .Body Fluid Pleural Fluid  Preliminary Report (03-01-21 @ 10:38):    Testing in progress    Culture - Body Fluid with Gram Stain (collected 02-27-21 @ 02:43)  Source: .Body Fluid Pleural Fluid  Gram Stain (02-27-21 @ 13:31):    polymorphonuclear leukocytes seen per low power field    No organisms seen per oil power field    by cytocentrifuge  Preliminary Report (02-28-21 @ 09:56):    No growth              Serum Pro-Brain Natriuretic Peptide: 39 pg/mL (02-27-21 @ 02:05)        RADIOLOGY, ECG, & ADDITIONAL TESTS:      RECENT DIAGNOSTIC ORDERS:  Cytopathology - Non Gyn Report: 10:44 (03-01-21 @ 10:51)  Magnesium, Serum: AM Sched. Collection: 02-Mar-2021 04:30 (03-01-21 @ 10:10)  Comprehensive Metabolic Panel: AM Sched. Collection: 02-Mar-2021 04:30 (03-01-21 @ 10:10)  Complete Blood Count + Automated Diff: AM Sched. Collection: 02-Mar-2021 04:30 (03-01-21 @ 10:10)  Xray Chest 1 View- PORTABLE-Routine: Routine   Indication: pleural effusion  Transport: Portable  Exam Completed (03-01-21 @ 08:15)  Ferritin, Serum: 16:00 (02-28-21 @ 13:07)      MEDICATIONS:  MEDICATIONS  (STANDING):  chlorhexidine 4% Liquid 1 Application(s) Topical <User Schedule>  enoxaparin Injectable 40 milliGRAM(s) SubCutaneous at bedtime  iron sucrose IVPB 100 milliGRAM(s) IV Intermittent every 24 hours  melatonin 5 milliGRAM(s) Oral at bedtime  pantoprazole    Tablet 40 milliGRAM(s) Oral before breakfast  senna 2 Tablet(s) Oral at bedtime    MEDICATIONS  (PRN):  naproxen 250 milliGRAM(s) Oral every 6 hours PRN Moderate Pain (4 - 6)  oxycodone    5 mG/acetaminophen 325 mG 1 Tablet(s) Oral every 6 hours PRN Moderate Pain (4 - 6)      HOME MEDICATIONS:      PHYSICAL EXAM:  GENERAL: NAD, well-groomed, well-developed  HEAD:  Atraumatic, Normocephalic  NECK: Supple  NERVOUS SYSTEM:  Alert & Oriented X3, non focal   CHEST/LUNG: decreased bs L base; No rales, rhonchi, wheezing, or rubs  HEART: Regular rate and rhythm; No murmurs, rubs, or gallops  ABDOMEN: Soft, Nontender, Nondistended; Bowel sounds present  EXTREMITIES:  2+ Peripheral Pulses, No clubbing, cyanosis, or edema  SKIN: No rashes or lesions

## 2021-03-01 NOTE — PROGRESS NOTE ADULT - SUBJECTIVE AND OBJECTIVE BOX
Patient is a 40y old  Female who presents with a chief complaint of Shortness of breath (28 Feb 2021 17:07)        SUBJECTIVE:  S/p thoracentesis yesterday (02/28), 1500 cc removed, serous with patient improvement in symptoms.       PHYSICAL EXAM  Vital Signs Last 24 Hrs  T(C): 36.3 (01 Mar 2021 04:35), Max: 36.3 (01 Mar 2021 04:35)  T(F): 97.3 (01 Mar 2021 04:35), Max: 97.3 (01 Mar 2021 04:35)  HR: 83 (01 Mar 2021 04:35) (83 - 97)  BP: 98/59 (01 Mar 2021 04:35) (98/59 - 116/73)  BP(mean): --  RR: 18 (01 Mar 2021 04:35) (18 - 20)  SpO2: --    CONSTITUTIONAL:  Well nourished.  NAD    ENT:   Airway patent,   No thrush    EYES:   Clear bilaterally,   pupils equal, round and reactive to light.    CARDIAC:   Normal rate,   regular rhythm.    no edema    CAROTID:   normal systolic impulse  no bruits    RESPIRATORY:   No wheezing  Normal chest expansion  Not tachypneic,  No use of accessory muscles    GASTROINTESTINAL:  Abdomen soft,   non-tender,   no guarding,   + BS    GENITOURINARY  normal genitalia for sex  no edema    MUSCULOSKELETAL:   range of motion is not limited,  no clubbing, cyanosis    NEUROLOGICAL:   Alert and oriented   no motor  deficits.    SKIN:   Skin normal color for race,   No evidence of rash.    PSYCHIATRIC:   normal mood and affect.   no apparent risk to self or others.        LABS:                          10.4   7.60  )-----------( 379      ( 01 Mar 2021 06:46 )             32.9                                               03-01    141  |  104  |  13  ----------------------------<  95  5.0   |  27  |  0.6<L>    Ca    8.8      01 Mar 2021 06:46    TPro  5.6<L>  /  Alb  2.7<L>  /  TBili  <0.2  /  DBili  x   /  AST  11  /  ALT  14  /  AlkPhos  76  03-01      PT/INR - ( 28 Feb 2021 07:31 )   PT: 15.60 sec;   INR: 1.36 ratio         PTT - ( 28 Feb 2021 07:31 )  PTT:36.0 sec                                                                                     LIVER FUNCTIONS - ( 01 Mar 2021 06:46 )  Alb: 2.7 g/dL / Pro: 5.6 g/dL / ALK PHOS: 76 U/L / ALT: 14 U/L / AST: 11 U/L / GGT: x                                                  Culture - Body Fluid with Gram Stain (collected 28 Feb 2021 09:20)  Source: .Body Fluid Pleural Fluid  Gram Stain (28 Feb 2021 22:50):    polymorphonuclear leukocytes seen    No organisms seen    by cytocentrifuge    Culture - Body Fluid with Gram Stain (collected 27 Feb 2021 02:43)  Source: .Body Fluid Pleural Fluid  Gram Stain (27 Feb 2021 13:31):    polymorphonuclear leukocytes seen per low power field    No organisms seen per oil power field    by cytocentrifuge  Preliminary Report (28 Feb 2021 09:56):    No growth                                                    MEDICATIONS  (STANDING):  chlorhexidine 4% Liquid 1 Application(s) Topical <User Schedule>  enoxaparin Injectable 40 milliGRAM(s) SubCutaneous at bedtime  iron sucrose IVPB 100 milliGRAM(s) IV Intermittent every 24 hours  melatonin 5 milliGRAM(s) Oral at bedtime  pantoprazole    Tablet 40 milliGRAM(s) Oral before breakfast  senna 2 Tablet(s) Oral at bedtime    MEDICATIONS  (PRN):  naproxen 250 milliGRAM(s) Oral every 6 hours PRN Moderate Pain (4 - 6)  oxycodone    5 mG/acetaminophen 325 mG 1 Tablet(s) Oral every 6 hours PRN Moderate Pain (4 - 6)      X-Rays reviewed    CXR interpreted by me: decreased left pleural effusion

## 2021-03-01 NOTE — DISCHARGE NOTE PROVIDER - HOSPITAL COURSE
41 y/o female with a past medical history of Hodgkin's Lymphoma which is currently active, s/p one dose of ABVD (stopped after one dose due to a bad allergic reaction, which she describes as anaphylactic) presenting for progressively worsening SOB, from SOB on exertion to SOB at rest. Pt found to have complete opacification of the left lung with deviation of the trachea to the right side. Pt underwent a thoracocentesis on 2/27/21 in the ED that meghna 570 mL and the next day, Pt had a repeat thoracocentesis that meghna 1500mL on 2/28/21.    This is hospital Day 2. She notes that she is still experiencing a dry, 'sweaky" cough. States that her SOB has improved, but notes that she still has mild orthopnea at night and mild SOB at rest. In association, she notes that she has night sweats. She is on room air. In addition, she notes that she has a left swollen, painful breast, no erythema, no discharge. Notes that she also has mild abd pain in the right upper quadrant and epigastric region. Denies fever, chills, nausea, vomiting, diarrhea. 39 y/o female with a past medical history of Hodgkin's Lymphoma which is currently active, s/p one dose of ABVD (stopped after one dose due to a bad allergic reaction, which she describes as anaphylactic) presenting for progressively worsening SOB, from SOB on exertion to SOB at rest. Pt found to have complete opacification of the left lung with deviation of the trachea to the right side. Pt underwent a thoracocentesis on 2/27/21 in the ED that meghna 570 mL and the next day, Pt had a repeat thoracocentesis that meghna 1500mL on 2/28/21. Repeat chest xray on 3/1 showed small pneumothorax that requires monitoring. She was seen by Dr. Childress and plan was made to follow up with Dr. Childress to start on immunologics as patient does not want APVD. Additionally patient was found to have THOMPSON and received 1 dose of IV iron. She was complaining of abdominal pain s/p Hpylori. Stool antigen sent to be followed up. Patient stable for discharge. 39 y/o female with a past medical history of Hodgkin's Lymphoma which is currently active, s/p one dose of ABVD (stopped after one dose due to a bad allergic reaction, which she describes as anaphylactic) presenting for progressively worsening SOB, from SOB on exertion to SOB at rest. Pt found to have complete opacification of the left lung with deviation of the trachea to the right side. Pt underwent a thoracocentesis on 2/27/21 in the ED that meghna 570 mL and the next day, Pt had a repeat thoracocentesis that meghna 1500mL on 2/28/21. Repeat chest xray on 3/1 showed small pneumothorax that requires monitoring. 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 She had a pleurix catheter placed. She was seen by Dr. Childress and plan was made to follow up with Dr. Childress to start on immunologics as patient does not want APVD. Additionally patient was found to have THOMPSON and received 1 dose of IV iron. She was complaining of abdominal pain s/p Hpylori. Stool antigen sent to be followed up. Patient stable for discharge. 41 y/o female with a past medical history of Hodgkin's Lymphoma which is currently active, s/p one dose of ABVD (stopped after one dose due to a bad allergic reaction, which she describes as anaphylactic) presenting for progressively worsening SOB, from SOB on exertion to SOB at rest. Pt found to have complete opacification of the left lung with deviation of the trachea to the right side. Pt underwent a thoracocentesis on 2/27/21 in the ED that meghna 570 mL and the next day, Pt had a repeat thoracocentesis that meghna 1500mL on 2/28/21. Repeat chest xray on 3/1 showed small pneumothorax that requires monitoring. 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 She had a pleurix catheter placed on 3/5. She was seen by Dr. Childress and plan was made to follow up with Dr. Childress to start on immunologics as patient does not want APVD. Additionally patient was found to have THOMPSON and received 1 dose of IV iron. She was complaining of abdominal pain likely secondary to the cancer. Patient stable for discharge. 39 y/o female with a past medical history of Hodgkin's Lymphoma which is currently active, s/p one dose of ABVD (stopped after one dose due to a bad allergic reaction, which she describes as anaphylactic) presenting for progressively worsening SOB, from SOB on exertion to SOB at rest. Pt found to have complete opacification of the left lung with deviation of the trachea to the right side. Pt underwent a thoracocentesis on 2/27/21 in the ED that meghna 570 mL and the next day, Pt had a repeat thoracocentesis that meghna 1500mL on 2/28/21. Repeat chest xray on 3/1 showed small pneumothorax that requires monitoring. 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 She had a pleurix catheter placed on 3/5 s/p drainage of 1000 L. She was seen by Dr. Childress and plan was made to follow up with Dr. Childress to start on immunologics as patient does not want APVD. found to have THOMPSON She was complaining of abdominal pain likely secondary to underlying malignancy. Patient stable for discharge.

## 2021-03-02 LAB
ALBUMIN SERPL ELPH-MCNC: 3.3 G/DL — LOW (ref 3.5–5.2)
ALP SERPL-CCNC: 89 U/L — SIGNIFICANT CHANGE UP (ref 30–115)
ALT FLD-CCNC: 15 U/L — SIGNIFICANT CHANGE UP (ref 0–41)
ANION GAP SERPL CALC-SCNC: 12 MMOL/L — SIGNIFICANT CHANGE UP (ref 7–14)
AST SERPL-CCNC: 13 U/L — SIGNIFICANT CHANGE UP (ref 0–41)
BASOPHILS # BLD AUTO: 0.03 K/UL — SIGNIFICANT CHANGE UP (ref 0–0.2)
BASOPHILS NFR BLD AUTO: 0.3 % — SIGNIFICANT CHANGE UP (ref 0–1)
BILIRUB SERPL-MCNC: <0.2 MG/DL — SIGNIFICANT CHANGE UP (ref 0.2–1.2)
BUN SERPL-MCNC: 11 MG/DL — SIGNIFICANT CHANGE UP (ref 10–20)
CALCIUM SERPL-MCNC: 9.6 MG/DL — SIGNIFICANT CHANGE UP (ref 8.5–10.1)
CHLORIDE SERPL-SCNC: 99 MMOL/L — SIGNIFICANT CHANGE UP (ref 98–110)
CO2 SERPL-SCNC: 28 MMOL/L — SIGNIFICANT CHANGE UP (ref 17–32)
CREAT SERPL-MCNC: 0.7 MG/DL — SIGNIFICANT CHANGE UP (ref 0.7–1.5)
EOSINOPHIL # BLD AUTO: 0.13 K/UL — SIGNIFICANT CHANGE UP (ref 0–0.7)
EOSINOPHIL NFR BLD AUTO: 1.5 % — SIGNIFICANT CHANGE UP (ref 0–8)
GLUCOSE SERPL-MCNC: 77 MG/DL — SIGNIFICANT CHANGE UP (ref 70–99)
HCT VFR BLD CALC: 36 % — LOW (ref 37–47)
HGB BLD-MCNC: 11.2 G/DL — LOW (ref 12–16)
IMM GRANULOCYTES NFR BLD AUTO: 0.2 % — SIGNIFICANT CHANGE UP (ref 0.1–0.3)
LYMPHOCYTES # BLD AUTO: 1.24 K/UL — SIGNIFICANT CHANGE UP (ref 1.2–3.4)
LYMPHOCYTES # BLD AUTO: 14.1 % — LOW (ref 20.5–51.1)
MAGNESIUM SERPL-MCNC: 2 MG/DL — SIGNIFICANT CHANGE UP (ref 1.8–2.4)
MCHC RBC-ENTMCNC: 25 PG — LOW (ref 27–31)
MCHC RBC-ENTMCNC: 31.1 G/DL — LOW (ref 32–37)
MCV RBC AUTO: 80.4 FL — LOW (ref 81–99)
MONOCYTES # BLD AUTO: 0.64 K/UL — HIGH (ref 0.1–0.6)
MONOCYTES NFR BLD AUTO: 7.3 % — SIGNIFICANT CHANGE UP (ref 1.7–9.3)
NEUTROPHILS # BLD AUTO: 6.76 K/UL — HIGH (ref 1.4–6.5)
NEUTROPHILS NFR BLD AUTO: 76.6 % — HIGH (ref 42.2–75.2)
NRBC # BLD: 0 /100 WBCS — SIGNIFICANT CHANGE UP (ref 0–0)
PLATELET # BLD AUTO: 415 K/UL — HIGH (ref 130–400)
POTASSIUM SERPL-MCNC: 4.8 MMOL/L — SIGNIFICANT CHANGE UP (ref 3.5–5)
POTASSIUM SERPL-SCNC: 4.8 MMOL/L — SIGNIFICANT CHANGE UP (ref 3.5–5)
PROT SERPL-MCNC: 6.2 G/DL — SIGNIFICANT CHANGE UP (ref 6–8)
RBC # BLD: 4.48 M/UL — SIGNIFICANT CHANGE UP (ref 4.2–5.4)
RBC # FLD: 15.1 % — HIGH (ref 11.5–14.5)
SODIUM SERPL-SCNC: 139 MMOL/L — SIGNIFICANT CHANGE UP (ref 135–146)
WBC # BLD: 8.82 K/UL — SIGNIFICANT CHANGE UP (ref 4.8–10.8)
WBC # FLD AUTO: 8.82 K/UL — SIGNIFICANT CHANGE UP (ref 4.8–10.8)

## 2021-03-02 PROCEDURE — 99233 SBSQ HOSP IP/OBS HIGH 50: CPT

## 2021-03-02 PROCEDURE — 93010 ELECTROCARDIOGRAM REPORT: CPT

## 2021-03-02 PROCEDURE — 71045 X-RAY EXAM CHEST 1 VIEW: CPT | Mod: 26

## 2021-03-02 RX ADMIN — ONDANSETRON 4 MILLIGRAM(S): 8 TABLET, FILM COATED ORAL at 19:48

## 2021-03-02 RX ADMIN — Medication 5 MILLIGRAM(S): at 19:48

## 2021-03-02 RX ADMIN — Medication 5 MILLILITER(S): at 15:46

## 2021-03-02 RX ADMIN — IRON SUCROSE 210 MILLIGRAM(S): 20 INJECTION, SOLUTION INTRAVENOUS at 09:41

## 2021-03-02 RX ADMIN — CHLORHEXIDINE GLUCONATE 1 APPLICATION(S): 213 SOLUTION TOPICAL at 05:42

## 2021-03-02 RX ADMIN — TRAMADOL HYDROCHLORIDE 25 MILLIGRAM(S): 50 TABLET ORAL at 15:46

## 2021-03-02 RX ADMIN — PANTOPRAZOLE SODIUM 40 MILLIGRAM(S): 20 TABLET, DELAYED RELEASE ORAL at 05:42

## 2021-03-02 NOTE — PROGRESS NOTE ADULT - ASSESSMENT
This is a 40 year old F patient with Hx of Hodgkin lymphoma ( Active, S/p one dose of ABVD ) p/w SOB, night sweats, fevers and L sided non radiating pleuritic chest pain. Found to have a large L pleural effusion s/p thoracentesis x2.      Hodgkin Lymphoma  Left sided pleural effusion  Diagnosed 8/2020, s/p 1x treatment w/ ABVD w/ reported anaphylactic reaction   Xray on adm w/ complete opacification of left lung  s/p thora x2- cytology pending, gram stain negative x 2  complicated by likely PTX, saturating well on room air and ambulation, but became tachycardic to 120s on ambulation  pulm on board, will likely need pleurx later on  recall pulm given PTX  patient has an appt with heme onc on friday to go over treatment options  Tramadol for pain control     Iron Deficiency Anemia   - s/p Venofer    #Progress Note Handoff  Pending (specify):   pulm f/u for PTX, monitor oxygen status  Family discussion: Plan of care discussed with patient, aware and agreeable   Disposition:  fully functional from home     Vielka Berman MD  s. [phone]

## 2021-03-02 NOTE — PROGRESS NOTE ADULT - SUBJECTIVE AND OBJECTIVE BOX
pt more sob today   coughing   had thoracocentesis on monday   today found to have pneumothorax   no fever   also c/o fast heart beat

## 2021-03-02 NOTE — MEDICAL STUDENT PROGRESS NOTE(EDUCATION) - SUBJECTIVE AND OBJECTIVE BOX
Subjective:    39 y/o female pt with a PMHx of active Non- Hodgkin's Lymphoma, s/p one dose of APVD, but not on any chemo treatments right now due to a severe reaction to chemo which she describes as anaphylactic, is admitted for SOB and cough associated with pleurisy. Pt found to have complete opacification of the left lung with deviation of the trachea to the right side. Pt underwent a thoracocentesis on 2/27/21 in the ED that meghna 570 mL and the next day, Pt had a repeat thoracocentesis that meghna 1500mL on 2/28/21. Pt notes improvement in breathing after thoracocentesis    This is hospital day 3. Pt notes that she is feeling overall better. Notes that her cough has improved, intermittent and dry in nature. Notes some chest pain when coughing and mild SOB after coughing in the morning. She also states that she is still experiencing abd pain, but has improved after toradol. Notes swelling to left breast and pain. Denies fever, chills, n/v/d, lightheadedness, dizziness, HA.     REVIEW OF SYSTEMS      General: + night sweats, Denies fever, chills, HA     Skin/Breast: +swollen left breast and pain  	  ENMT:	Denies neck pain, sore throat     Respiratory and Thorax: +pleuritic chest pain, cough, SOB  	  Cardiovascular:	Denies palpitations.    Gastrointestinal:	+abd pain, Denies n/v/d    Genitourinary: Denies urinary symptoms     Musculoskeletal:	 Denies joint pain   	    Objective:    LOS: 3d    VITALS:   T(C): 37 (03-02-21 @ 04:06), Max: 37 (03-02-21 @ 04:06)  HR: 125 (03-02-21 @ 09:33) (65 - 125)  BP: 104/65 (03-02-21 @ 04:06) (97/68 - 115/69)  RR: 18 (03-02-21 @ 04:06) (17 - 18)  SpO2: 95% (03-02-21 @ 09:33) (95% - 95%)    pt is tachycardic (HR: 114) on ambulation.     Labs:                          11.2   8.82  )-----------( 415      ( 02 Mar 2021 05:50 )             36.0   03-02    139  |  99  |  11  ----------------------------<  77  4.8   |  28  |  0.7    Ca    9.6      02 Mar 2021 05:50  Mg     2.0     03-02    TPro  6.2  /  Alb  3.3<L>  /  TBili  <0.2  /  DBili  x   /  AST  13  /  ALT  15  /  AlkPhos  89  03-02      Radiology:    EXAM:  XR CHEST PORTABLE ROUTINE 1V            PROCEDURE DATE:  03/02/2021      INTERPRETATION:  Clinical History / Reason for exam: Evaluate for pneumothorax.    Comparison : Chest radiograph: 3/1/2021.    Technique/Positioning: Single AP chest radiograph.    Findings:    Support devices: None.    Cardiac/mediastinum/hilum: Partially obscured.    Lung parenchyma/Pleura: Stable left lung opacity/effusion. Left apical curvilinear density suspicious for pneumothorax is obscured on this examdue to patient obliquity, however a pneumothorax is likely given curvilinear appearance of left effusion.      Skeleton/soft tissues: No significant change.    Impression:  Pneumothorax is likely given curvilinear appearance of left effusion, however left lung apex is obscured due to obliquity.    Stable left lung opacity and effusion.      Physical Exam:   GENERAL: NAD, lying in bed comfortably  HEAD:  Atraumatic, Normocephalic  EYES: EOMI, PERRLA, conjunctiva and sclera clear  ENT: Moist mucous membranes  NECK: Supple, bilateral cervical anterior and posterior lymphadenopathy, large lymph node palpated on right anterior neck   Breast: swollen left breast, painful to palpation, no erythema, no discharge  CHEST/LUNG: decreased breath sounds auscultated left lung field; No wheezing, or rubs. Unlabored respirations  HEART: Regular rate and rhythm; No murmurs, rubs, or gallops  ABDOMEN: BSx4; Soft, tenderness to palpation of the right upper quadrant, nondistended  EXTREMITIES:  2+ Peripheral Pulses, brisk capillary refill. No clubbing, cyanosis, or edema  NERVOUS SYSTEM:  A&Ox3, no focal deficits   SKIN: No rashes or lesions

## 2021-03-02 NOTE — PROGRESS NOTE ADULT - ASSESSMENT
newly diagnosed,advanced stage hodgkins lymphoma   recurrent pleural effusion s/p thoracocentesis ,cytology pending   now has pneumothorax   on oxygen and being monitered closely for resp distress ,  discharge plan on hold as per medical team   s/p one dose of ABVD ,had anaphylactic reaction   need to be treated for malignancy   prognosis guarded   will follow with you     zach cortés MD   [phone]

## 2021-03-02 NOTE — PROGRESS NOTE ADULT - SUBJECTIVE AND OBJECTIVE BOX
DESTINEECLARA GUILLEN  40y Female    CHIEF COMPLAINT:    Patient is a 40y old  Female who presents with a chief complaint of Shortness of breath (01 Mar 2021 13:24)      INTERVAL HPI/OVERNIGHT EVENTS:    Patient seen and examined. No acute events overnight. Pain is controlled    ROS: All other systems are negative.    Vital Signs:    T(F): 98.6 (03-02-21 @ 04:06), Max: 98.6 (03-02-21 @ 04:06)  HR: 125 (03-02-21 @ 09:33) (65 - 125)  BP: 104/65 (03-02-21 @ 04:06) (97/68 - 115/69)  RR: 18 (03-02-21 @ 04:06) (17 - 18)  SpO2: 95% (03-02-21 @ 09:33) (95% - 95%)    PHYSICAL EXAM:    GENERAL:  NAD  SKIN: No rashes or lesions  HEENT: Atraumatic. Normocephalic.   NECK: Supple, No JVD. No lymphadenopathy.  PULMONARY: Decr breath sounds L. No wheezing. No rales  CVS: Normal S1, S2. Rate and Rhythm are regular.    ABDOMEN/GI: Soft, Nontender, Nondistended; BS present  MSK:  No edema B/L LE. No clubbing or cyanosis  NEUROLOGIC:  No motor or sensory deficit.  PSYCH: Alert & oriented x 3, normal affect    Consultant(s) Notes Reviewed:  [x ] YES  [ ] NO  Care Discussed with Consultants/Other Providers [ x] YES  [ ] NO    LABS:                        11.2   8.82  )-----------( 415      ( 02 Mar 2021 05:50 )             36.0     139  |  99  |  11  ----------------------------<  77  4.8   |  28  |  0.7    Ca    9.6      02 Mar 2021 05:50  Mg     2.0     03-02    TPro  6.2  /  Alb  3.3<L>  /  TBili  <0.2  /  DBili  x   /  AST  13  /  ALT  15  /  AlkPhos  89  03-02    Serum Pro-Brain Natriuretic Peptide: 39 pg/mL (02-27-21 @ 02:05)    Culture - Fungal, Body Fluid (collected 28 Feb 2021 09:20)  Source: .Body Fluid Pleural Fluid  Preliminary Report (01 Mar 2021 09:00):    Testing in progress    Culture - Body Fluid with Gram Stain (collected 28 Feb 2021 09:20)  Source: .Body Fluid Pleural Fluid  Gram Stain (28 Feb 2021 22:50):    polymorphonuclear leukocytes seen    No organisms seen    by cytocentrifuge  Preliminary Report (01 Mar 2021 16:13):    No growth    RADIOLOGY & ADDITIONAL TESTS:  < from: Xray Chest 1 View- PORTABLE-Routine (Xray Chest 1 View- PORTABLE-Routine in AM.) (03.02.21 @ 08:57) >    Impression:  Pneumothorax is likely given curvilinear appearance of left effusion, howeverleft lung apex is obscured due to obliquity.    Stable left lung opacity and effusion.    < end of copied text >    Imaging or report Personally Reviewed:  [x] YES  [ ] NO  EKG reviewed: [x] YES  [ ] NO    Medications:  Standing  chlorhexidine 4% Liquid 1 Application(s) Topical <User Schedule>  enoxaparin Injectable 40 milliGRAM(s) SubCutaneous at bedtime  iron sucrose IVPB 100 milliGRAM(s) IV Intermittent every 24 hours  melatonin 5 milliGRAM(s) Oral at bedtime  pantoprazole    Tablet 40 milliGRAM(s) Oral before breakfast  senna 2 Tablet(s) Oral at bedtime    PRN Meds  guaifenesin/dextromethorphan  Syrup 5 milliLiter(s) Oral every 6 hours PRN  ondansetron    Tablet 4 milliGRAM(s) Oral every 6 hours PRN  traMADol 25 milliGRAM(s) Oral every 6 hours PRN             DESTINEECLARA GUILLEN  40y Female    CHIEF COMPLAINT:    Patient is a 40y old  Female who presents with a chief complaint of Shortness of breath (01 Mar 2021 13:24)      INTERVAL HPI/OVERNIGHT EVENTS:    Patient seen and examined. No acute events overnight. Pain is controlled    ROS: All other systems are negative.    Vital Signs:    T(F): 98.6 (03-02-21 @ 04:06), Max: 98.6 (03-02-21 @ 04:06)  HR: 125 (03-02-21 @ 09:33) (65 - 125)  BP: 104/65 (03-02-21 @ 04:06) (97/68 - 115/69)  RR: 18 (03-02-21 @ 04:06) (17 - 18)  SpO2: 95% (03-02-21 @ 09:33) (95% - 95%)    PHYSICAL EXAM:    GENERAL:  NAD  SKIN: No rashes or lesions  HEENT: Atraumatic. Normocephalic.   NECK: Supple, No JVD. cervical lymphadenopathy.  PULMONARY: Decr breath sounds L. No wheezing. No rales  CVS: Normal S1, S2. Rate and Rhythm are regular.    ABDOMEN/GI: Soft, Nontender, Nondistended; BS present  MSK:  No edema B/L LE. No clubbing or cyanosis  NEUROLOGIC:  No motor or sensory deficit.  PSYCH: Alert & oriented x 3, normal affect    Consultant(s) Notes Reviewed:  [x ] YES  [ ] NO  Care Discussed with Consultants/Other Providers [ x] YES  [ ] NO    LABS:                        11.2   8.82  )-----------( 415      ( 02 Mar 2021 05:50 )             36.0     139  |  99  |  11  ----------------------------<  77  4.8   |  28  |  0.7    Ca    9.6      02 Mar 2021 05:50  Mg     2.0     03-02    TPro  6.2  /  Alb  3.3<L>  /  TBili  <0.2  /  DBili  x   /  AST  13  /  ALT  15  /  AlkPhos  89  03-02    Serum Pro-Brain Natriuretic Peptide: 39 pg/mL (02-27-21 @ 02:05)    Culture - Fungal, Body Fluid (collected 28 Feb 2021 09:20)  Source: .Body Fluid Pleural Fluid  Preliminary Report (01 Mar 2021 09:00):    Testing in progress    Culture - Body Fluid with Gram Stain (collected 28 Feb 2021 09:20)  Source: .Body Fluid Pleural Fluid  Gram Stain (28 Feb 2021 22:50):    polymorphonuclear leukocytes seen    No organisms seen    by cytocentrifuge  Preliminary Report (01 Mar 2021 16:13):    No growth    RADIOLOGY & ADDITIONAL TESTS:  < from: Xray Chest 1 View- PORTABLE-Routine (Xray Chest 1 View- PORTABLE-Routine in AM.) (03.02.21 @ 08:57) >    Impression:  Pneumothorax is likely given curvilinear appearance of left effusion, howeverleft lung apex is obscured due to obliquity.    Stable left lung opacity and effusion.    < end of copied text >    Imaging or report Personally Reviewed:  [x] YES  [ ] NO  EKG reviewed: [x] YES  [ ] NO    Medications:  Standing  chlorhexidine 4% Liquid 1 Application(s) Topical <User Schedule>  enoxaparin Injectable 40 milliGRAM(s) SubCutaneous at bedtime  iron sucrose IVPB 100 milliGRAM(s) IV Intermittent every 24 hours  melatonin 5 milliGRAM(s) Oral at bedtime  pantoprazole    Tablet 40 milliGRAM(s) Oral before breakfast  senna 2 Tablet(s) Oral at bedtime    PRN Meds  guaifenesin/dextromethorphan  Syrup 5 milliLiter(s) Oral every 6 hours PRN  ondansetron    Tablet 4 milliGRAM(s) Oral every 6 hours PRN  traMADol 25 milliGRAM(s) Oral every 6 hours PRN

## 2021-03-02 NOTE — MEDICAL STUDENT PROGRESS NOTE(EDUCATION) - NS MD HP STUD ASPLAN PLAN FT
#Left pleural Effusion with cough - Pneumothorax  - repeat chest x-ray 3/1/21 revealed: Pneumothorax likely given curvilinear appearance of left effusion, however left lung apex is obscured due to obliquity. Stable left lung opacity and effusion.   - s/p 2 thoracocentesis on 2/27 and 2/28  - Pleural fluid analysis shows an exudative picture   - pt tachycardic @ 114 today during ambulation around hospital floor  - SpO2 during ambulation 95%  - pt is saturating well on room air, no hypoxia   - Pneumothorax will be monitored, repeat chest x-ray tomorrow possibly     #Non- Hodgkin's Lymphoma   - Diagnosed back in Aug 2020   - Used to follow with Dr. Schultz for APVD treatment, but after one bad reaction to treatment, pt chose to forego chemotherapy and do holistic treatments   - Will follow up with Dr. Childress outpatient to start immunotherapy     #abd pain:  - hx of H. pylori treated 2 months ago   - follow up with H. pylori stool antigen test   - tramadol for pain PRN     # Iron Deficiency Anemia:  - s/p Venlofer x1 yesterday  - total Serum iron yesterday: 29   - hgb: 11.4 @ 3/2/21    #Diet: DASH  #Activity: IAT   #DVT prophylaxis: Lovenox   #CHG order  #Code: Full       - monitor HgB and iron levels      #Left pleural Effusion with cough - Pneumothorax  - chest Xray on admisison showed complete opacification of the Left lung with deviation of the trachea to the right side  - Thoracocentesis on 2/27/21 in the ED that meghna 570 mL and the next day, Pt had a repeat thoracocentesis that meghna 1500mL on 2/28/21  - chest x-ray 3/1/21 revealed: Left apical curvilinear density suspicious for pneumothorax  - Repeat chest xray 3/2/21 showed worsening pneumothorax as per conversation with radiologist (approximately 5mm to 1cm)  - Pleural fluid analysis shows an exudative picture   - pt tachycardic @ 114 today during ambulation around hospital floor  - SpO2 during ambulation 95%  - pt is saturating well on room air, no hypoxia   - Pneumothorax will be monitored, repeat chest x-ray tomorrow possibly   - Will speak to pulm about possible interventions for pneumothorax    #Non- Hodgkin's Lymphoma   - Diagnosed back in Aug 2020   - Used to follow with Dr. Schultz for APVD treatment, but after one bad reaction to treatment, pt chose to forego chemotherapy and do holistic treatments   - Will follow up with Dr. Childress outpatient to start immunotherapy     #abd pain:  - hx of H. pylori treated 2 months ago   - follow up with H. pylori stool antigen test   - tramadol for pain PRN     # Iron Deficiency Anemia:  - s/p Venlofer x1 yesterday  - total Serum iron yesterday: 29   - hgb: 11.4 @ 3/2/21    #Diet: DASH  #Activity: IAT   #DVT prophylaxis: Lovenox   #CHG order  #Code: Full      #Left pleural Effusion with cough - Pneumothorax  - chest Xray on admisison showed complete opacification of the Left lung with deviation of the trachea to the right side  - Thoracocentesis on 2/27/21 in the ED that meghna 570 mL and the next day, Pt had a repeat thoracocentesis that meghna 1500mL on 2/28/21  - chest x-ray 3/1/21 revealed: Left apical curvilinear density suspicious for pneumothorax  - Repeat chest xray 3/2/21 showed worsening pneumothorax as per conversation with radiologist (approximately 5mm to 1cm)  - Pleural fluid analysis shows an exudative picture   - pt tachycardic @ 114 today during ambulation around hospital floor  - SpO2 during ambulation 95%  - pt is saturating well on room air, no hypoxia   - Pneumothorax will be monitored, repeat chest x-ray tomorrow possibly   - Will speak to pulm about possible interventions for pneumothorax --> Start on NRB and get chest xray at 4pm; if getting worse will need IR consult    #Non- Hodgkin's Lymphoma   - Diagnosed back in Aug 2020   - Used to follow with Dr. Schultz for APVD treatment, but after one bad reaction to treatment, pt chose to forego chemotherapy and do holistic treatments   - Will follow up with Dr. Childress outpatient to start immunotherapy     #abd pain:  - hx of H. pylori treated 2 months ago   - follow up with H. pylori stool antigen test   - tramadol for pain PRN     # Iron Deficiency Anemia:  - s/p Venlofer x1 yesterday  - total Serum iron yesterday: 29   - hgb: 11.4 @ 3/2/21    #Diet: DASH  #Activity: IAT   #DVT prophylaxis: Lovenox   #CHG order  #Code: Full      #Left pleural Effusion with cough - Pneumothorax  - chest Xray on admisison showed complete opacification of the Left lung with deviation of the trachea to the right side  - Thoracocentesis on 2/27/21 in the ED that meghna 570 mL and the next day, Pt had a repeat thoracocentesis that meghna 1500mL on 2/28/21  - chest x-ray 3/1/21 revealed: Left apical curvilinear density suspicious for pneumothorax  - Repeat chest xray 3/2/21 showed worsening pneumothorax as per conversation with radiologist (approximately 5mm to 1cm)  - Pleural fluid analysis shows an exudative picture   - pt tachycardic @ 114 today during ambulation around hospital floor  - SpO2 during ambulation 95%  - pt is saturating well on room air, no hypoxia   - Pneumothorax will be monitored, repeat chest x-ray tomorrow possibly   - Will speak to pulm about possible interventions for pneumothorax --> Start on NRB and get chest xray tomorrow; if getting worse will need IR consult    #Non- Hodgkin's Lymphoma   - Diagnosed back in Aug 2020   - Used to follow with Dr. Schultz for APVD treatment, but after one bad reaction to treatment, pt chose to forego chemotherapy and do holistic treatments   - Will follow up with Dr. Childress outpatient to start immunotherapy     #abd pain:  - hx of H. pylori treated 2 months ago   - follow up with H. pylori stool antigen test   - tramadol for pain PRN     # Iron Deficiency Anemia:  - s/p Venlofer x1 yesterday  - total Serum iron yesterday: 29   - hgb: 11.4 @ 3/2/21    #Diet: DASH  #Activity: IAT   #DVT prophylaxis: Lovenox   #CHG order  #Code: Full

## 2021-03-03 ENCOUNTER — TRANSCRIPTION ENCOUNTER (OUTPATIENT)
Age: 41
End: 2021-03-03

## 2021-03-03 LAB
ALBUMIN SERPL ELPH-MCNC: 3.1 G/DL — LOW (ref 3.5–5.2)
ALP SERPL-CCNC: 83 U/L — SIGNIFICANT CHANGE UP (ref 30–115)
ALT FLD-CCNC: 11 U/L — SIGNIFICANT CHANGE UP (ref 0–41)
ANION GAP SERPL CALC-SCNC: 11 MMOL/L — SIGNIFICANT CHANGE UP (ref 7–14)
AST SERPL-CCNC: 11 U/L — SIGNIFICANT CHANGE UP (ref 0–41)
BASOPHILS # BLD AUTO: 0.03 K/UL — SIGNIFICANT CHANGE UP (ref 0–0.2)
BASOPHILS NFR BLD AUTO: 0.3 % — SIGNIFICANT CHANGE UP (ref 0–1)
BILIRUB SERPL-MCNC: 0.3 MG/DL — SIGNIFICANT CHANGE UP (ref 0.2–1.2)
BUN SERPL-MCNC: 12 MG/DL — SIGNIFICANT CHANGE UP (ref 10–20)
CALCIUM SERPL-MCNC: 8.9 MG/DL — SIGNIFICANT CHANGE UP (ref 8.5–10.1)
CHLORIDE SERPL-SCNC: 99 MMOL/L — SIGNIFICANT CHANGE UP (ref 98–110)
CO2 SERPL-SCNC: 29 MMOL/L — SIGNIFICANT CHANGE UP (ref 17–32)
CREAT SERPL-MCNC: 0.6 MG/DL — LOW (ref 0.7–1.5)
EOSINOPHIL # BLD AUTO: 0.1 K/UL — SIGNIFICANT CHANGE UP (ref 0–0.7)
EOSINOPHIL NFR BLD AUTO: 1.1 % — SIGNIFICANT CHANGE UP (ref 0–8)
GLUCOSE BLDC GLUCOMTR-MCNC: 143 MG/DL — HIGH (ref 70–99)
GLUCOSE BLDC GLUCOMTR-MCNC: 97 MG/DL — SIGNIFICANT CHANGE UP (ref 70–99)
GLUCOSE SERPL-MCNC: 88 MG/DL — SIGNIFICANT CHANGE UP (ref 70–99)
H PYLORI AG STL QL: SIGNIFICANT CHANGE UP
HCT VFR BLD CALC: 32.4 % — LOW (ref 37–47)
HGB BLD-MCNC: 10.2 G/DL — LOW (ref 12–16)
IMM GRANULOCYTES NFR BLD AUTO: 0.4 % — HIGH (ref 0.1–0.3)
LYMPHOCYTES # BLD AUTO: 0.82 K/UL — LOW (ref 1.2–3.4)
LYMPHOCYTES # BLD AUTO: 9.2 % — LOW (ref 20.5–51.1)
MAGNESIUM SERPL-MCNC: 1.8 MG/DL — SIGNIFICANT CHANGE UP (ref 1.8–2.4)
MCHC RBC-ENTMCNC: 24.8 PG — LOW (ref 27–31)
MCHC RBC-ENTMCNC: 31.5 G/DL — LOW (ref 32–37)
MCV RBC AUTO: 78.6 FL — LOW (ref 81–99)
MONOCYTES # BLD AUTO: 0.57 K/UL — SIGNIFICANT CHANGE UP (ref 0.1–0.6)
MONOCYTES NFR BLD AUTO: 6.4 % — SIGNIFICANT CHANGE UP (ref 1.7–9.3)
NEUTROPHILS # BLD AUTO: 7.36 K/UL — HIGH (ref 1.4–6.5)
NEUTROPHILS NFR BLD AUTO: 82.6 % — HIGH (ref 42.2–75.2)
NRBC # BLD: 0 /100 WBCS — SIGNIFICANT CHANGE UP (ref 0–0)
PLATELET # BLD AUTO: 386 K/UL — SIGNIFICANT CHANGE UP (ref 130–400)
POTASSIUM SERPL-MCNC: 4.4 MMOL/L — SIGNIFICANT CHANGE UP (ref 3.5–5)
POTASSIUM SERPL-SCNC: 4.4 MMOL/L — SIGNIFICANT CHANGE UP (ref 3.5–5)
PROT SERPL-MCNC: 5.7 G/DL — LOW (ref 6–8)
RBC # BLD: 4.12 M/UL — LOW (ref 4.2–5.4)
RBC # FLD: 15.2 % — HIGH (ref 11.5–14.5)
SODIUM SERPL-SCNC: 139 MMOL/L — SIGNIFICANT CHANGE UP (ref 135–146)
WBC # BLD: 8.92 K/UL — SIGNIFICANT CHANGE UP (ref 4.8–10.8)
WBC # FLD AUTO: 8.92 K/UL — SIGNIFICANT CHANGE UP (ref 4.8–10.8)

## 2021-03-03 PROCEDURE — 99232 SBSQ HOSP IP/OBS MODERATE 35: CPT

## 2021-03-03 PROCEDURE — 99233 SBSQ HOSP IP/OBS HIGH 50: CPT

## 2021-03-03 PROCEDURE — 71045 X-RAY EXAM CHEST 1 VIEW: CPT | Mod: 26

## 2021-03-03 RX ORDER — SIMETHICONE 80 MG/1
80 TABLET, CHEWABLE ORAL EVERY 6 HOURS
Refills: 0 | Status: DISCONTINUED | OUTPATIENT
Start: 2021-03-03 | End: 2021-03-06

## 2021-03-03 RX ORDER — SIMETHICONE 80 MG/1
80 TABLET, CHEWABLE ORAL EVERY 6 HOURS
Refills: 0 | Status: DISCONTINUED | OUTPATIENT
Start: 2021-03-03 | End: 2021-03-03

## 2021-03-03 RX ADMIN — CHLORHEXIDINE GLUCONATE 1 APPLICATION(S): 213 SOLUTION TOPICAL at 05:27

## 2021-03-03 RX ADMIN — PANTOPRAZOLE SODIUM 40 MILLIGRAM(S): 20 TABLET, DELAYED RELEASE ORAL at 05:27

## 2021-03-03 RX ADMIN — ENOXAPARIN SODIUM 40 MILLIGRAM(S): 100 INJECTION SUBCUTANEOUS at 21:17

## 2021-03-03 RX ADMIN — Medication 5 MILLIGRAM(S): at 21:17

## 2021-03-03 RX ADMIN — TRAMADOL HYDROCHLORIDE 25 MILLIGRAM(S): 50 TABLET ORAL at 16:30

## 2021-03-03 RX ADMIN — SIMETHICONE 80 MILLIGRAM(S): 80 TABLET, CHEWABLE ORAL at 18:15

## 2021-03-03 RX ADMIN — IRON SUCROSE 210 MILLIGRAM(S): 20 INJECTION, SOLUTION INTRAVENOUS at 12:36

## 2021-03-03 RX ADMIN — TRAMADOL HYDROCHLORIDE 25 MILLIGRAM(S): 50 TABLET ORAL at 15:57

## 2021-03-03 NOTE — PROGRESS NOTE ADULT - SUBJECTIVE AND OBJECTIVE BOX
DESTINEECLARA GUILLEN  40y  Female  ***My note supersedes ALL resident notes that I sign.  My corrections for their notes are in my note.***    I can be reached directly on [employer]. My office number is [phone]. My personal cell number is [phone].    INTERVAL EVENTS: Here for f/u of SOB. Pt still SOB w/ min activity, including short walks. Pt not getting chemo for Hodgkin's Lymphoma and thus has recurrent pl eff on left.  Pt also has on/off abd pain, which she gets about every other day. It comes and goes and nothing seems to make it better or worse.  She does not associate it with anything in particular, like food or anxiety or gas.  Pt can walk in hallway independently. She has a chronic cough.    T(F): 96.7 (03-03-21 @ 14:11), Max: 97.6 (03-02-21 @ 19:47)  HR: 107 (03-03-21 @ 14:11) (80 - 107)  BP: 100/63 (03-03-21 @ 14:11) (98/51 - 100/63)  RR: 18 (03-03-21 @ 14:11) (18 - 20)  SpO2: 100% (03-02-21 @ 19:57) (100% - 100%)    Gen: NAD  HEENT: PERRL, EOMI, mouth clr, nose clr  Neck: + b/l cerv nodes, no JVD, thyroid nl  lungs: decr BS on entire left side; rt side clr  hrt: s1 s2 rrr no murmur  abd: soft, NT/ND, no HS megaly  ext: no edema, no c/c  neuro: aa ox3, cn intact, can move all 4 ext    LABS:                      10.2    (    78.6   8.92  )-----------( ---------      386      ( 03 Mar 2021 07:27 )             32.4    (    15.2     139   (   99   (   88      03-03-21 @ 07:27  ----------------------               4.4   (   29   (   12                             -----                        0.6  Ca  8.9   Mg  1.8    P   --     LFT  5.7  (  0.3  (  11       03-03-21 @ 07:27  -------------------------  3.1  (  83  (  11    CAPILLARY BLOOD GLUCOSE  POCT Blood Glucose.: 143 (03-03-21 @ 11:45)  POCT Blood Glucose.: 97 (03-03-21 @ 07:45)    Culture - Body Fluid with Gram Stain (collected 02-28-21 @ 09:20)  Source: .Body Fluid Pleural Fluid  Gram Stain (02-28-21 @ 22:50):    polymorphonuclear leukocytes seen    No organisms seen    by cytocentrifuge  Preliminary Report (03-01-21 @ 16:13):    No growth    Culture - Body Fluid with Gram Stain (collected 02-27-21 @ 02:43)  Source: .Body Fluid Pleural Fluid  Gram Stain (02-27-21 @ 13:31):    polymorphonuclear leukocytes seen per low power field    No organisms seen per oil power field    by cytocentrifuge  Preliminary Report (02-28-21 @ 09:56):    No growth    Final Diagnosis   PLEURAL FLUID, THORACENTESIS:   - NO MALIGNANT CELLS IDENTIFIED.   - Small lymphocytes and histiocytes with few mesothelial cells.   - Cell block pending.   RADIOLOGY & ADDITIONAL TESTS:  < from: Xray Chest 1 View- PORTABLE-Routine (Xray Chest 1 View- PORTABLE-Routine in AM.) (03.03.21 @ 06:18) >  IMPRESSION:    Obscured cardiomediastinal silhouette. Increased loculated left pleural effusion and parenchymal opacities with near complete opacification of the left hemithorax. No radiographically evident pneumothorax. Unchanged osseous structures.    < end of copied text >    < from: CT Angio Chest PE Protocol w/ IV Cont (02.27.21 @ 06:06) >  IMPRESSION:    No CT evidence of acute pulmonary embolus.    Large left pleural effusion occupying the entire left thoracic cavity and collapsed in the left lung. Associated rightward shift of the mediastinum and trachea    Small to moderate right pleural effusion.    Bilateral axillary as well as mediastinal lymphadenopathy    < end of copied text >    < from: NM PET/CT Onc FDG Skull to Thigh, Inital (12.23.20 @ 10:09) >  FINDINGS:    Head/Neck:    Bilateral cervical chain lymphadenopathy, SUV max up to 17.7 (on the right, axial image 229).    Thorax:    Heterogeneous conglomerate FDG avid lymphadenopathy within the anterior mediastinum with apparent extension into the medial left anterior chest wall, SUV max up to 12.9.  There is tracheal deviation towards the right as a result of the lymphadenopathy.    Multifocal FDG avid subcarinal and left hilar lymph nodes, SUV max up to 11.6 in the left hilum.    Right subpectoral FDG avid lymphadenopathy, SUV max up to 17.0.  Right axillary FDG avid lymphadenopathy, SUV max up to 11.0.    Left subpectoral FDG avid lymphadenopathy, SUV max up to 14.8.  Left axillary FDG avid lymphadenopathy, SUV max up to 11.6.    FDG avid periesophageal lymph node, SUV max 10.7 (axial image 171).    Large left pleural effusion, which is not FDG avid with resultant near complete atelectasis of the left lung.    Small pericardial effusion, not FDG avid.    Abdomen/Pelvis: Physiologic GI/  activity. No abnormal visceral or clyde tracer uptake is present.    Musculoskeletal: No suspicious hypermetabolic osseous lesions.    Additional CT findings:  None.    IMPRESSION:    Multifocal FDG avid lymphadenopathy seen within the head and neck and thorax. Most extensive region of disease is within the anterior mediastinum with extension into the chest wall. Maximum SUV measures 17.7 in the right cervical chain.    Large left pleural effusion which is not FDG avid with resultant near complete atelectasis of the left lung.    Small pericardial effusion, not FDG avid.    Right tracheal deviation secondary to lymphadenopathy.    < end of copied text >    MEDICATIONS:    chlorhexidine 4% Liquid 1 Application(s) Topical <User Schedule>  enoxaparin Injectable 40 milliGRAM(s) SubCutaneous at bedtime  guaifenesin/dextromethorphan  Syrup 5 milliLiter(s) Oral every 6 hours PRN  melatonin 5 milliGRAM(s) Oral at bedtime  naproxen 250 milliGRAM(s) Oral every 12 hours PRN  ondansetron    Tablet 4 milliGRAM(s) Oral every 6 hours PRN  pantoprazole    Tablet 40 milliGRAM(s) Oral before breakfast  senna 2 Tablet(s) Oral at bedtime  simethicone 80 milliGRAM(s) Chew every 6 hours  traMADol 25 milliGRAM(s) Oral every 6 hours PRN   Tylenol with Codeine #3 300 mg-30 mg oral tablet: 1 tab(s) orally every 6 hours, As Needed -for moderate pain MDD:6 clindamycin 300 mg oral capsule: 1 cap(s) orally every 8 hours  lactobacillus acidophilus oral capsule: 1 tab(s) orally once a day   oxycodone-acetaminophen 5 mg-325 mg oral tablet: 1 tab(s) orally every 6 hours, As Needed -Severe Pain (7 - 10) - for severe pain MDD:6 tabs

## 2021-03-03 NOTE — PROGRESS NOTE ADULT - SUBJECTIVE AND OBJECTIVE BOX
Patient is a 40y old  Female who presents with a chief complaint of Shortness of breath (02 Mar 2021 19:04)      SUBJECTIVE:  Patient on RA, comfortable. States she felt much better after thoracentesis.       PHYSICAL EXAM  Vital Signs Last 24 Hrs  T(C): 36.1 (03 Mar 2021 05:03), Max: 36.4 (02 Mar 2021 19:47)  T(F): 97 (03 Mar 2021 05:03), Max: 97.6 (02 Mar 2021 19:47)  HR: 80 (03 Mar 2021 05:03) (80 - 125)  BP: 98/51 (03 Mar 2021 05:03) (93/64 - 98/56)  BP(mean): --  RR: 18 (03 Mar 2021 05:03) (17 - 20)  SpO2: 100% (02 Mar 2021 19:57) (95% - 100%)    CONSTITUTIONAL:  Well nourished.  NAD    ENT:   Airway patent,   No thrush    EYES:   Clear bilaterally,   pupils equal, round and reactive to light.    CARDIAC:   Normal rate,   regular rhythm.    no edema    CAROTID:   normal systolic impulse  no bruits    RESPIRATORY:   No wheezing  Normal chest expansion  Not tachypneic,  No use of accessory muscles    GASTROINTESTINAL:  Abdomen soft,   non-tender,   no guarding,   + BS    MUSCULOSKELETAL:   range of motion is not limited,  no clubbing, cyanosis    NEUROLOGICAL:   AOx4  Follows commands  Moves all extremities  Alert and oriented   no motor  deficits.    SKIN:   Skin normal color for race,   No evidence of rash.    PSYCHIATRIC:   normal mood and affect.   no apparent risk to self or others.        LABS:                          11.2   8.82  )-----------( 415      ( 02 Mar 2021 05:50 )             36.0                                               03-02    139  |  99  |  11  ----------------------------<  77  4.8   |  28  |  0.7    Ca    9.6      02 Mar 2021 05:50  Mg     2.0     03-02    TPro  6.2  /  Alb  3.3<L>  /  TBili  <0.2  /  DBili  x   /  AST  13  /  ALT  15  /  AlkPhos  89  03-02                                                                                        LIVER FUNCTIONS - ( 02 Mar 2021 05:50 )  Alb: 3.3 g/dL / Pro: 6.2 g/dL / ALK PHOS: 89 U/L / ALT: 15 U/L / AST: 13 U/L / GGT: x                                                  Culture - Fungal, Body Fluid (collected 28 Feb 2021 09:20)  Source: .Body Fluid Pleural Fluid  Preliminary Report (01 Mar 2021 09:00):    Testing in progress    Culture - Body Fluid with Gram Stain (collected 28 Feb 2021 09:20)  Source: .Body Fluid Pleural Fluid  Gram Stain (28 Feb 2021 22:50):    polymorphonuclear leukocytes seen    No organisms seen    by cytocentrifuge  Preliminary Report (01 Mar 2021 16:13):    No growth                                                    MEDICATIONS  (STANDING):  chlorhexidine 4% Liquid 1 Application(s) Topical <User Schedule>  enoxaparin Injectable 40 milliGRAM(s) SubCutaneous at bedtime  iron sucrose IVPB 100 milliGRAM(s) IV Intermittent every 24 hours  melatonin 5 milliGRAM(s) Oral at bedtime  pantoprazole    Tablet 40 milliGRAM(s) Oral before breakfast  senna 2 Tablet(s) Oral at bedtime    MEDICATIONS  (PRN):  guaifenesin/dextromethorphan  Syrup 5 milliLiter(s) Oral every 6 hours PRN Cough  naproxen 250 milliGRAM(s) Oral every 12 hours PRN Moderate Pain (4 - 6)  ondansetron    Tablet 4 milliGRAM(s) Oral every 6 hours PRN Nausea and/or Vomiting  traMADol 25 milliGRAM(s) Oral every 6 hours PRN Severe Pain (7 - 10)      X-Rays reviewed    CXR interpreted by me: reaccumulation of left pleural effusion Patient is a 40y old  Female who presents with a chief complaint of Shortness of breath (02 Mar 2021 19:04)      SUBJECTIVE:  Patient on RA, comfortable. States she felt much better after thoracentesis.       PHYSICAL EXAM  Vital Signs Last 24 Hrs  T(C): 36.1 (03 Mar 2021 05:03), Max: 36.4 (02 Mar 2021 19:47)  T(F): 97 (03 Mar 2021 05:03), Max: 97.6 (02 Mar 2021 19:47)  HR: 80 (03 Mar 2021 05:03) (80 - 125)  BP: 98/51 (03 Mar 2021 05:03) (93/64 - 98/56)  BP(mean): --  RR: 18 (03 Mar 2021 05:03) (17 - 20)  SpO2: 100% (02 Mar 2021 19:57) (95% - 100%)    CONSTITUTIONAL:  Well nourished.  NAD    ENT:   Airway patent,   No thrush    EYES:   Clear bilaterally,   pupils equal, round and reactive to light.    CARDIAC:   Normal rate,   regular rhythm.    no edema    CAROTID:   normal systolic impulse  no bruits    RESPIRATORY:   Inspection- normal chest wall symmetry  Palpation- normal chest wall expansion  Auscultation- decreased breath sounds on left  Bedside Lung US- large left pleural effusion, anechoic  No wheezing  Normal chest expansion  Not tachypneic,  No use of accessory muscles    GASTROINTESTINAL:  Abdomen soft,   non-tender,   no guarding,   + BS    MUSCULOSKELETAL:   range of motion is not limited,  no clubbing, cyanosis    NEUROLOGICAL:   AOx4  Follows commands  Moves all extremities  Alert and oriented   no motor  deficits.    SKIN:   Skin normal color for race,   No evidence of rash.    PSYCHIATRIC:   normal mood and affect.   no apparent risk to self or others.        LABS:                          11.2   8.82  )-----------( 415      ( 02 Mar 2021 05:50 )             36.0                                               03-02    139  |  99  |  11  ----------------------------<  77  4.8   |  28  |  0.7    Ca    9.6      02 Mar 2021 05:50  Mg     2.0     03-02    TPro  6.2  /  Alb  3.3<L>  /  TBili  <0.2  /  DBili  x   /  AST  13  /  ALT  15  /  AlkPhos  89  03-02                                                                                        LIVER FUNCTIONS - ( 02 Mar 2021 05:50 )  Alb: 3.3 g/dL / Pro: 6.2 g/dL / ALK PHOS: 89 U/L / ALT: 15 U/L / AST: 13 U/L / GGT: x                                                  Culture - Fungal, Body Fluid (collected 28 Feb 2021 09:20)  Source: .Body Fluid Pleural Fluid  Preliminary Report (01 Mar 2021 09:00):    Testing in progress    Culture - Body Fluid with Gram Stain (collected 28 Feb 2021 09:20)  Source: .Body Fluid Pleural Fluid  Gram Stain (28 Feb 2021 22:50):    polymorphonuclear leukocytes seen    No organisms seen    by cytocentrifuge  Preliminary Report (01 Mar 2021 16:13):    No growth                                                    MEDICATIONS  (STANDING):  chlorhexidine 4% Liquid 1 Application(s) Topical <User Schedule>  enoxaparin Injectable 40 milliGRAM(s) SubCutaneous at bedtime  iron sucrose IVPB 100 milliGRAM(s) IV Intermittent every 24 hours  melatonin 5 milliGRAM(s) Oral at bedtime  pantoprazole    Tablet 40 milliGRAM(s) Oral before breakfast  senna 2 Tablet(s) Oral at bedtime    MEDICATIONS  (PRN):  guaifenesin/dextromethorphan  Syrup 5 milliLiter(s) Oral every 6 hours PRN Cough  naproxen 250 milliGRAM(s) Oral every 12 hours PRN Moderate Pain (4 - 6)  ondansetron    Tablet 4 milliGRAM(s) Oral every 6 hours PRN Nausea and/or Vomiting  traMADol 25 milliGRAM(s) Oral every 6 hours PRN Severe Pain (7 - 10)      X-Rays reviewed    CXR interpreted by me: reaccumulation of left pleural effusion

## 2021-03-03 NOTE — PROGRESS NOTE ADULT - ASSESSMENT
40 year old woman with Hx of Hodgkin lymphoma (Active, S/p one dose of ABVD w/ anaphylactic rxn) p/w SOB, night sweats, fevers and L sided non radiating pleuritic chest pain. Found to have a large L pleural effusion.    # Hodgkin Lymphoma w/ very extensive dz on PET scan in neck, mediastinum and thorax w/ tracheal dev to rt; very large (and recurrent) lt pl eff (cytology neg); sm rt pl eff and sm pericard eff  cough is from cancer and pl effusions  s/p thoracentesis x2, which caused PTX, which might be resolved now on latest xray  Diagnosed 8/2020 - and still not adequately treated (s/p 1x treatment w/ ABVD w/ reported anaphylactic reaction)  h/o is planning on immunoTx as outpt -> spoke w/ Dr Childress: pt got ABVD w/ Dr Schultz at [facility] and had bad rxn; pt was offered a re-trial of the chemo as inpt, but pt declined; Dr Childress was called as 2nd opinion and is offering 2nd line tx w/ immunoTx (pembrolizumab); this has to be given as outpt, so we need to d/c pt to get there  pl eff on left is recurrent: insurance is NOT approving collection canisters for a pleurX cath; Pulm is going to speak w/ CT Sx to see if surgical pleurodesis would provide adequate answer to this problem  pt is on room air, but has easy ESCALANTE and still tachy w/ ambulation -> pt cannot go home w/out effusion issue resolved (she will be readmitted quickly)  robitussin DM for cough   Tramadol for pain control   naprosyn for pain or fever    # microcytic Anemia  iron studies are not truly c/w THOMPSON   s/p Venofer  no need for further iron    # abd pain - non-specific  pt had H pylori a few mo ago  f/u stool for HP Ag  try simethicone  cont PPI  zofran prn N/V    Dispo: CT Sx eval for Sx pleurodesis; f/u stool H Pylori; w/u abd pain; outpt h/o f/u for immunoTx

## 2021-03-03 NOTE — PROGRESS NOTE ADULT - SUBJECTIVE AND OBJECTIVE BOX
extensive lymphadanopathy mainly in mediastinum and head and neck area ,path  consistent with hodgkins disease  large pleural effusion   sob and tachypnea and tachycardia   sp thoricocentesis twice  in last 3 days   still has large effusion and pneumothorax   symptomatic  with cough and sob

## 2021-03-03 NOTE — PROGRESS NOTE ADULT - ASSESSMENT
extensive stage hodgkins sp one cycle of ABVD ,REFUSING FUTHER TREATMENT WITH ABVD  SAYS almost  from reaction   recurrent effusion  symptomatic ,discussed with DR HANKINS  NEED to take care of effusion ,will be seen by chest surgery  need pleurodehesis   although no malignant cells in pleural fuid but cell block is pending   will offer immunotherapy as an alternative  2nd line as out pt   was discussed with pt   willing to try immunotherapy  prognosis  guarded     zach cortés MD   [phone]

## 2021-03-03 NOTE — MEDICAL STUDENT PROGRESS NOTE(EDUCATION) - SUBJECTIVE AND OBJECTIVE BOX
Subjective:    39 y/o female pt with a PMHx of active Hodgkin's Lymphoma (s/p one dose of APVD, not currently on chemotherapy due to adverse "anaphylactic" reaction) is being evaluated for SOB s/p left pleural effusion. Pt found to have complete opacification of the left lung with deviation of the trachea to the right side. Pt underwent a thoracocentesis on 2/27/21 in the ED that meghna 570 mL and the next day, Pt had a repeat thoracocentesis that meghna 1500mL on 2/28/21.    This is hospital day 3 for this patient. Pt notes that overnight she was using a nonrebreather mask as recommended by Pulmonology. Pt states that her cough has shifted from a dry cough to a productive cough with clear to yellow sputum. Notes that during her coughing attacks, she has been experiencing nausea due to the intensity of the cough. She also states that overnight she has been getting SOB with exertion, more specifically walking to the bathroom. In addition, she notes mild abd pain the epigastrium. Denies diarrhea, vomiting, fevers, chills.     REVIEW OF SYSTEMS    General: Denies fever, chills, HA	  	  ENMT: Denies neck pain, sore throat     Respiratory and Thorax: +cough, +sputum, +pleurisy, +SOB  	  Cardiovascular: Denies palpitations     Gastrointestinal:	+abd pain in epigastrium, +nausea     Genitourinary: Denies urinary symptoms     Musculoskeletal:	 Denies back pain     	    Objective: Subjective:    41 y/o female pt with a PMHx of active Hodgkin's Lymphoma (s/p one dose of APVD, not currently on chemotherapy due to adverse "anaphylactic" reaction) is being evaluated for SOB s/p left pleural effusion. Pt found to have complete opacification of the left lung with deviation of the trachea to the right side. Pt underwent a thoracocentesis on 2/27/21 in the ED that meghna 570 mL and the next day, Pt had a repeat thoracocentesis that meghna 1500mL on 2/28/21.    This is hospital day 3 for this patient. Pt notes that overnight she was using a nonrebreather mask as recommended by Pulmonology. Pt states that her cough has shifted from a dry cough to a productive cough with clear to yellow sputum. Notes that during her coughing attacks, she has been experiencing nausea due to the intensity of the cough. She also states that overnight she has been getting SOB with exertion, more specifically walking to the bathroom. In addition, she notes mild abd pain in the epigastrium and left upper abd. Denies diarrhea, vomiting, fevers, chills.     REVIEW OF SYSTEMS    General: Denies fever, chills, HA	+night sweats  	  ENMT: Denies neck pain, sore throat     Respiratory and Thorax: +cough, +sputum, +pleurisy, +SOB  	  Cardiovascular: Denies palpitations     Gastrointestinal:	+abd pain in epigastrium and left upper abd, +nausea     Genitourinary: Denies urinary symptoms     Musculoskeletal:	 Denies back pain     	    Objective:    LOS: 4d    VITALS:   T(C): 36.1 (03-03-21 @ 05:03), Max: 36.4 (03-02-21 @ 19:47)  HR: 80 (03-03-21 @ 05:03) (80 - 94)  BP: 98/51 (03-03-21 @ 05:03) (93/64 - 98/56)  RR: 18 (03-03-21 @ 05:03) (17 - 20)  SpO2: 100% (03-02-21 @ 19:57) (100% - 100%)    Labs:                          11.2   8.82  )-----------( 415      ( 02 Mar 2021 05:50 )             36.0     139  |  99  |  11  ----------------------------<  77  4.8   |  28  |  0.7    Ca    9.6      02 Mar 2021 05:50  Mg     2.0     03-02    TPro  6.2  /  Alb  3.3<L>  /  TBili  <0.2  /  DBili  x   /  AST  13  /  ALT  15  /  AlkPhos  89  03-02      Radiology:    Chest X-ray 3/3/21 - results still pending   - worsening of left pleural effusion     Physical Exam:   GENERAL: NAD, lying in bed comfortably  HEAD:  Atraumatic, Normocephalic  EYES: EOMI, PERRLA, conjunctiva and sclera clear  ENT: Moist mucous membranes  NECK: Supple, No JVD  CHEST/LUNG: +cough with sputum, decreased breath sounds on the left, No rales, rhonchi, wheezing, or rubs. Unlabored respirations  HEART: Regular rate and rhythm; No murmurs, rubs, or gallops  ABDOMEN: BSx4; Soft, nondistended, moderate pain to palpation of upper abd, RUQ, epigastrium and LUQ  EXTREMITIES:  2+ Peripheral Pulses, brisk capillary refill. No clubbing, cyanosis, or edema  NERVOUS SYSTEM:  A&Ox3, no focal deficits   SKIN: No rashes or lesions

## 2021-03-03 NOTE — PROGRESS NOTE ADULT - ASSESSMENT
Impression  Large exudative left pleural effusion s/p thora in ER (02/27) and repeat thora 02/28  Trapped lung  HO Hodgkin lymphoma (s/p 1 dose ABVD)    Recommendations  FU PFA   HOB at 45  Aspiration precautions  DVT ppx  FU with hem onc  OP pulm FU  Will likely need PleurX   Stable from pulmonary standpoint for discharge Impression  Large exudative left pleural effusion s/p thora in ER (02/27) and repeat thora 02/28  Trapped lung  HO Hodgkin lymphoma (s/p 1 dose ABVD)    Recommendations  FU PFA   HOB at 43  Aspiration precautions  DVT ppx  FU with heme onc  OP pulm FU  Will likely need PleurX inpatient Impression  Large exudative left pleural effusion s/p thora in ER (02/27) and repeat thora 02/28  HO Hodgkin lymphoma (s/p 1 dose ABVD)    Recommendations  FU PFA   HOB at 43  Aspiration precautions  DVT ppx  FU with heme onc  OP pulm FU  MIght benefit for A PleurX Catheter

## 2021-03-03 NOTE — DISCHARGE NOTE NURSING/CASE MANAGEMENT/SOCIAL WORK - PATIENT PORTAL LINK FT
You can access the FollowMyHealth Patient Portal offered by [facility] by registering at the following website: [email or web address]. By joining [employer]’s FollowMyHealth portal, you will also be able to view your health information using other applications (apps) compatible with our system.

## 2021-03-03 NOTE — MEDICAL STUDENT PROGRESS NOTE(EDUCATION) - NS MD HP STUD ASPLAN PLAN FT
#Hodgkin's Lymphoma:   - diagnosed 6/2020  - s/p one dose of APVD, pt had an adverse, anaphylactic reaction, so decided to forego chemo treatment  - Heme/Onc following: Pt has appointment with Dr. Childress outpt on Friday for immunotherapy options     # Left pleural effusion with cough - Pneumothorax:  - chest Xray on admission showed complete opacification of the Left lung with deviation of the trachea to the right side  - Thoracocentesis on 2/27/21 in the ED that meghna 570 mL and the next day, Pt had a repeat thoracocentesis that meghna 1500mL on 2/28/21  - chest x-ray 3/1/21 revealed: Left apical curvilinear density suspicious for pneumothorax  - Repeat chest xray 3/2/21 showed worsening pneumothorax as per conversation with radiologist (approximately 5mm to 1cm)  - Due worsening pneumothorax, pt was put on 100% oxygen non-rebreather  - Pleural fluid analysis shows an exudative picture   - Chest x-ray 3/3/21: reaccumulation of exudative pleural fluid, pneumothorax remaining about the same   - Pulm following: will likely need PleurX inpatient     #Iron Deficiency Anemia:  #abd pain:  - hx of H. pylori treated 2 months ago   - follow up with H. pylori stool antigen test   - tramadol for pain PRN     # Iron Deficiency Anemia:  - s/p Venlofer x1 3/1/21  - total Serum iron 3/1/21: 29   - hgb: 11.4 @ 3/2/21, hgb: 10.2 @ 3/3/21    #Diet: DASH  #Activity: IAT   #DVT prophylaxis: Lovenox   #CHG order  #Code: Full    # Left pleural effusion with cough - Pneumothorax:  - chest Xray on admission showed complete opacification of the Left lung with deviation of the trachea to the right side  - Thoracocentesis on 2/27/21 in the ED that meghna 570 mL and the next day, Pt had a repeat thoracocentesis that meghna 1500mL on 2/28/21  - chest x-ray 3/1/21 revealed: Left apical curvilinear density suspicious for pneumothorax  - Repeat chest xray 3/2/21 showed worsening pneumothorax as per conversation with radiologist (approximately 5mm to 1cm)  - Due worsening pneumothorax, pt was put on 100% oxygen non-rebreather  - Pleural fluid analysis shows an exudative picture   - Chest x-ray 3/3/21: reaccumulation of exudative pleural fluid, pneumothorax remaining about the same   - Pulm following: will likely need PleurX inpatient     #Hodgkin's Lymphoma:   - diagnosed 6/2020  - s/p one dose of APVD, pt had an adverse, anaphylactic reaction, so decided to forego chemo treatment  - Heme/Onc following: Pt has appointment with Dr. Childress outpt on Friday for immunotherapy options     #abd pain vs musculoskeletal pain from coughing  - hx of H. pylori treated 2 months ago   - follow up with H. pylori stool antigen test   - tramadol for pain PRN   - Started simethicone    # Iron Deficiency Anemia:  - s/p Venlofer x1 3/1/21  - total Serum iron 3/1/21: 29   - hgb: 11.4 @ 3/2/21, hgb: 10.2 @ 3/3/21    #Diet: DASH  #Activity: IAT   #DVT prophylaxis: Lovenox   #CHG order  #Code: Full    # Left pleural effusion with cough - Pneumothorax:  - chest Xray on admission showed complete opacification of the Left lung with deviation of the trachea to the right side  - Thoracocentesis on 2/27/21 in the ED that meghna 570 mL and the next day, Pt had a repeat thoracocentesis that meghna 1500mL on 2/28/21  - chest x-ray 3/1/21 revealed: Left apical curvilinear density suspicious for pneumothorax  - Repeat chest xray 3/2/21 showed worsening pneumothorax as per conversation with radiologist (approximately 5mm to 1cm)  - Due worsening pneumothorax, pt was put on 100% oxygen non-rebreather  - Pleural fluid analysis shows an exudative picture   - Chest x-ray 3/3/21: reaccumulation of exudative pleural fluid, pneumothorax remaining about the same   - Pulm following: will likely need PleurX inpatient     #Hodgkin's Lymphoma:   - diagnosed 6/2020  - s/p one dose of APVD, pt had an adverse, anaphylactic reaction, so decided to forego chemo treatment  - Heme/Onc following: Pt has appointment with Dr. Childress outpt on Friday for immunotherapy options     #abd pain vs musculoskeletal pain from coughing  - hx of H. pylori treated 2 months ago   - follow up with H. pylori stool antigen test   - tramadol for pain PRN   - Started simethicone    # Anemia of chronic disease  - s/p Venlofer x1 3/1/21  - total Serum iron 3/1/21: 29   - hgb: 11.4 @ 3/2/21, hgb: 10.2 @ 3/3/21  - No need for iron    #Diet: DASH  #Activity: IAT   #DVT prophylaxis: Lovenox   #CHG order  #Code: Full

## 2021-03-04 LAB
ALBUMIN SERPL ELPH-MCNC: 2.8 G/DL — LOW (ref 3.5–5.2)
ALBUMIN SERPL ELPH-MCNC: 3.1 G/DL — LOW (ref 3.5–5.2)
ALP SERPL-CCNC: 82 U/L — SIGNIFICANT CHANGE UP (ref 30–115)
ALP SERPL-CCNC: 85 U/L — SIGNIFICANT CHANGE UP (ref 30–115)
ALT FLD-CCNC: 14 U/L — SIGNIFICANT CHANGE UP (ref 0–41)
ALT FLD-CCNC: 16 U/L — SIGNIFICANT CHANGE UP (ref 0–41)
ANION GAP SERPL CALC-SCNC: 11 MMOL/L — SIGNIFICANT CHANGE UP (ref 7–14)
ANION GAP SERPL CALC-SCNC: 7 MMOL/L — SIGNIFICANT CHANGE UP (ref 7–14)
APTT BLD: 34.7 SEC — SIGNIFICANT CHANGE UP (ref 27–39.2)
AST SERPL-CCNC: 13 U/L — SIGNIFICANT CHANGE UP (ref 0–41)
AST SERPL-CCNC: 16 U/L — SIGNIFICANT CHANGE UP (ref 0–41)
BASOPHILS # BLD AUTO: 0.02 K/UL — SIGNIFICANT CHANGE UP (ref 0–0.2)
BASOPHILS # BLD AUTO: 0.02 K/UL — SIGNIFICANT CHANGE UP (ref 0–0.2)
BASOPHILS NFR BLD AUTO: 0.2 % — SIGNIFICANT CHANGE UP (ref 0–1)
BASOPHILS NFR BLD AUTO: 0.2 % — SIGNIFICANT CHANGE UP (ref 0–1)
BILIRUB SERPL-MCNC: <0.2 MG/DL — SIGNIFICANT CHANGE UP (ref 0.2–1.2)
BILIRUB SERPL-MCNC: <0.2 MG/DL — SIGNIFICANT CHANGE UP (ref 0.2–1.2)
BLD GP AB SCN SERPL QL: SIGNIFICANT CHANGE UP
BUN SERPL-MCNC: 11 MG/DL — SIGNIFICANT CHANGE UP (ref 10–20)
BUN SERPL-MCNC: 16 MG/DL — SIGNIFICANT CHANGE UP (ref 10–20)
CALCIUM SERPL-MCNC: 8.8 MG/DL — SIGNIFICANT CHANGE UP (ref 8.5–10.1)
CALCIUM SERPL-MCNC: 9.1 MG/DL — SIGNIFICANT CHANGE UP (ref 8.5–10.1)
CHLORIDE SERPL-SCNC: 102 MMOL/L — SIGNIFICANT CHANGE UP (ref 98–110)
CHLORIDE SERPL-SCNC: 99 MMOL/L — SIGNIFICANT CHANGE UP (ref 98–110)
CO2 SERPL-SCNC: 29 MMOL/L — SIGNIFICANT CHANGE UP (ref 17–32)
CO2 SERPL-SCNC: 30 MMOL/L — SIGNIFICANT CHANGE UP (ref 17–32)
CREAT SERPL-MCNC: 0.6 MG/DL — LOW (ref 0.7–1.5)
CREAT SERPL-MCNC: 0.7 MG/DL — SIGNIFICANT CHANGE UP (ref 0.7–1.5)
CULTURE RESULTS: SIGNIFICANT CHANGE UP
EOSINOPHIL # BLD AUTO: 0.1 K/UL — SIGNIFICANT CHANGE UP (ref 0–0.7)
EOSINOPHIL # BLD AUTO: 0.12 K/UL — SIGNIFICANT CHANGE UP (ref 0–0.7)
EOSINOPHIL NFR BLD AUTO: 1.1 % — SIGNIFICANT CHANGE UP (ref 0–8)
EOSINOPHIL NFR BLD AUTO: 1.2 % — SIGNIFICANT CHANGE UP (ref 0–8)
GLUCOSE BLDC GLUCOMTR-MCNC: 105 MG/DL — HIGH (ref 70–99)
GLUCOSE BLDC GLUCOMTR-MCNC: 112 MG/DL — HIGH (ref 70–99)
GLUCOSE BLDC GLUCOMTR-MCNC: 136 MG/DL — HIGH (ref 70–99)
GLUCOSE BLDC GLUCOMTR-MCNC: 141 MG/DL — HIGH (ref 70–99)
GLUCOSE SERPL-MCNC: 104 MG/DL — HIGH (ref 70–99)
GLUCOSE SERPL-MCNC: 122 MG/DL — HIGH (ref 70–99)
HCT VFR BLD CALC: 31.2 % — LOW (ref 37–47)
HCT VFR BLD CALC: 32.4 % — LOW (ref 37–47)
HGB BLD-MCNC: 10 G/DL — LOW (ref 12–16)
HGB BLD-MCNC: 9.5 G/DL — LOW (ref 12–16)
IMM GRANULOCYTES NFR BLD AUTO: 0.3 % — SIGNIFICANT CHANGE UP (ref 0.1–0.3)
IMM GRANULOCYTES NFR BLD AUTO: 0.3 % — SIGNIFICANT CHANGE UP (ref 0.1–0.3)
INR BLD: 1.3 RATIO — SIGNIFICANT CHANGE UP (ref 0.65–1.3)
LYMPHOCYTES # BLD AUTO: 0.86 K/UL — LOW (ref 1.2–3.4)
LYMPHOCYTES # BLD AUTO: 0.99 K/UL — LOW (ref 1.2–3.4)
LYMPHOCYTES # BLD AUTO: 9.4 % — LOW (ref 20.5–51.1)
LYMPHOCYTES # BLD AUTO: 9.8 % — LOW (ref 20.5–51.1)
MAGNESIUM SERPL-MCNC: 1.8 MG/DL — SIGNIFICANT CHANGE UP (ref 1.8–2.4)
MAGNESIUM SERPL-MCNC: 2 MG/DL — SIGNIFICANT CHANGE UP (ref 1.8–2.4)
MCHC RBC-ENTMCNC: 24.4 PG — LOW (ref 27–31)
MCHC RBC-ENTMCNC: 24.4 PG — LOW (ref 27–31)
MCHC RBC-ENTMCNC: 30.4 G/DL — LOW (ref 32–37)
MCHC RBC-ENTMCNC: 30.9 G/DL — LOW (ref 32–37)
MCV RBC AUTO: 79.2 FL — LOW (ref 81–99)
MCV RBC AUTO: 80 FL — LOW (ref 81–99)
MONOCYTES # BLD AUTO: 0.61 K/UL — HIGH (ref 0.1–0.6)
MONOCYTES # BLD AUTO: 0.66 K/UL — HIGH (ref 0.1–0.6)
MONOCYTES NFR BLD AUTO: 6.5 % — SIGNIFICANT CHANGE UP (ref 1.7–9.3)
MONOCYTES NFR BLD AUTO: 6.7 % — SIGNIFICANT CHANGE UP (ref 1.7–9.3)
NEUTROPHILS # BLD AUTO: 7.49 K/UL — HIGH (ref 1.4–6.5)
NEUTROPHILS # BLD AUTO: 8.33 K/UL — HIGH (ref 1.4–6.5)
NEUTROPHILS NFR BLD AUTO: 82 % — HIGH (ref 42.2–75.2)
NEUTROPHILS NFR BLD AUTO: 82.3 % — HIGH (ref 42.2–75.2)
NRBC # BLD: 0 /100 WBCS — SIGNIFICANT CHANGE UP (ref 0–0)
NRBC # BLD: 0 /100 WBCS — SIGNIFICANT CHANGE UP (ref 0–0)
PLATELET # BLD AUTO: 345 K/UL — SIGNIFICANT CHANGE UP (ref 130–400)
PLATELET # BLD AUTO: 371 K/UL — SIGNIFICANT CHANGE UP (ref 130–400)
POTASSIUM SERPL-MCNC: 4.9 MMOL/L — SIGNIFICANT CHANGE UP (ref 3.5–5)
POTASSIUM SERPL-MCNC: 5.1 MMOL/L — HIGH (ref 3.5–5)
POTASSIUM SERPL-SCNC: 4.9 MMOL/L — SIGNIFICANT CHANGE UP (ref 3.5–5)
POTASSIUM SERPL-SCNC: 5.1 MMOL/L — HIGH (ref 3.5–5)
PROT SERPL-MCNC: 5.6 G/DL — LOW (ref 6–8)
PROT SERPL-MCNC: 5.8 G/DL — LOW (ref 6–8)
PROTHROM AB SERPL-ACNC: 15 SEC — HIGH (ref 9.95–12.87)
RBC # BLD: 3.9 M/UL — LOW (ref 4.2–5.4)
RBC # BLD: 4.09 M/UL — LOW (ref 4.2–5.4)
RBC # FLD: 15.3 % — HIGH (ref 11.5–14.5)
RBC # FLD: 15.4 % — HIGH (ref 11.5–14.5)
SARS-COV-2 RNA SPEC QL NAA+PROBE: SIGNIFICANT CHANGE UP
SODIUM SERPL-SCNC: 136 MMOL/L — SIGNIFICANT CHANGE UP (ref 135–146)
SODIUM SERPL-SCNC: 142 MMOL/L — SIGNIFICANT CHANGE UP (ref 135–146)
SPECIMEN SOURCE: SIGNIFICANT CHANGE UP
WBC # BLD: 10.15 K/UL — SIGNIFICANT CHANGE UP (ref 4.8–10.8)
WBC # BLD: 9.11 K/UL — SIGNIFICANT CHANGE UP (ref 4.8–10.8)
WBC # FLD AUTO: 10.15 K/UL — SIGNIFICANT CHANGE UP (ref 4.8–10.8)
WBC # FLD AUTO: 9.11 K/UL — SIGNIFICANT CHANGE UP (ref 4.8–10.8)

## 2021-03-04 PROCEDURE — 99232 SBSQ HOSP IP/OBS MODERATE 35: CPT

## 2021-03-04 PROCEDURE — 71045 X-RAY EXAM CHEST 1 VIEW: CPT | Mod: 26

## 2021-03-04 PROCEDURE — 99233 SBSQ HOSP IP/OBS HIGH 50: CPT

## 2021-03-04 RX ORDER — LANOLIN ALCOHOL/MO/W.PET/CERES
5 CREAM (GRAM) TOPICAL ONCE
Refills: 0 | Status: COMPLETED | OUTPATIENT
Start: 2021-03-04 | End: 2021-03-04

## 2021-03-04 RX ADMIN — TRAMADOL HYDROCHLORIDE 25 MILLIGRAM(S): 50 TABLET ORAL at 15:35

## 2021-03-04 RX ADMIN — TRAMADOL HYDROCHLORIDE 25 MILLIGRAM(S): 50 TABLET ORAL at 16:05

## 2021-03-04 RX ADMIN — SIMETHICONE 80 MILLIGRAM(S): 80 TABLET, CHEWABLE ORAL at 11:43

## 2021-03-04 RX ADMIN — SIMETHICONE 80 MILLIGRAM(S): 80 TABLET, CHEWABLE ORAL at 17:34

## 2021-03-04 RX ADMIN — CHLORHEXIDINE GLUCONATE 1 APPLICATION(S): 213 SOLUTION TOPICAL at 05:49

## 2021-03-04 RX ADMIN — PANTOPRAZOLE SODIUM 40 MILLIGRAM(S): 20 TABLET, DELAYED RELEASE ORAL at 06:34

## 2021-03-04 RX ADMIN — SENNA PLUS 2 TABLET(S): 8.6 TABLET ORAL at 21:18

## 2021-03-04 RX ADMIN — Medication 5 MILLIGRAM(S): at 03:30

## 2021-03-04 RX ADMIN — SIMETHICONE 80 MILLIGRAM(S): 80 TABLET, CHEWABLE ORAL at 23:09

## 2021-03-04 RX ADMIN — SIMETHICONE 80 MILLIGRAM(S): 80 TABLET, CHEWABLE ORAL at 05:47

## 2021-03-04 RX ADMIN — Medication 5 MILLIGRAM(S): at 21:18

## 2021-03-04 RX ADMIN — SENNA PLUS 2 TABLET(S): 8.6 TABLET ORAL at 06:34

## 2021-03-04 RX ADMIN — TRAMADOL HYDROCHLORIDE 25 MILLIGRAM(S): 50 TABLET ORAL at 03:09

## 2021-03-04 RX ADMIN — TRAMADOL HYDROCHLORIDE 25 MILLIGRAM(S): 50 TABLET ORAL at 21:50

## 2021-03-04 NOTE — PROGRESS NOTE ADULT - SUBJECTIVE AND OBJECTIVE BOX
Patient is a 40y old  Female who presents with a chief complaint of Shortness of breath (03 Mar 2021 19:30)        SUBJECTIVE:  Patient states she is feeling fine, however chest x-ray has been getting worse day by day with tracheal deviation to contralateral side.     PHYSICAL EXAM  Vital Signs Last 24 Hrs  T(C): 35.9 (04 Mar 2021 08:29), Max: 36.1 (03 Mar 2021 21:14)  T(F): 96.6 (04 Mar 2021 08:29), Max: 96.9 (03 Mar 2021 21:14)  HR: 89 (04 Mar 2021 08:29) (86 - 107)  BP: 97/58 (04 Mar 2021 08:29) (87/50 - 114/61)  RR: 18 (04 Mar 2021 08:29) (18 - 18)    CONSTITUTIONAL:  Well nourished.  NAD    ENT:   Airway patent,   No thrush    EYES:   Clear bilaterally,   pupils equal, round and reactive to light.    CARDIAC:   Normal rate,   regular rhythm.    no edema    CAROTID:   normal systolic impulse  no bruits    RESPIRATORY:   Inspection- breast asymmetry  Palpation- normal chest wall expansion  Auscultation- dec BS on left  No wheezing  Not tachypneic,  No use of accessory muscles    GASTROINTESTINAL:  Abdomen soft,   non-tender,   no guarding,   + BS    MUSCULOSKELETAL:   range of motion is not limited,  no clubbing, cyanosis    NEUROLOGICAL:   AOx4  Follows commands  Moves all extremities  Alert and oriented   no motor  deficits.    SKIN:   Skin normal color for race,   No evidence of rash.    PSYCHIATRIC:   normal mood and affect.   no apparent risk to self or others.        LABS:                          9.5    10.15 )-----------( 371      ( 04 Mar 2021 05:24 )             31.2                                               03-04    142  |  102  |  16  ----------------------------<  104<H>  4.9   |  29  |  0.6<L>    Ca    9.1      04 Mar 2021 05:24  Mg     2.0     03-04    TPro  5.6<L>  /  Alb  2.8<L>  /  TBili  <0.2  /  DBili  x   /  AST  13  /  ALT  14  /  AlkPhos  82  03-04                                                                                           LIVER FUNCTIONS - ( 04 Mar 2021 05:24 )  Alb: 2.8 g/dL / Pro: 5.6 g/dL / ALK PHOS: 82 U/L / ALT: 14 U/L / AST: 13 U/L / GGT: x                                                                                                MEDICATIONS  (STANDING):  chlorhexidine 4% Liquid 1 Application(s) Topical <User Schedule>  enoxaparin Injectable 40 milliGRAM(s) SubCutaneous at bedtime  melatonin 5 milliGRAM(s) Oral at bedtime  pantoprazole    Tablet 40 milliGRAM(s) Oral before breakfast  senna 2 Tablet(s) Oral at bedtime  simethicone 80 milliGRAM(s) Chew every 6 hours    MEDICATIONS  (PRN):  guaifenesin/dextromethorphan  Syrup 5 milliLiter(s) Oral every 6 hours PRN Cough  naproxen 250 milliGRAM(s) Oral every 12 hours PRN Moderate Pain (4 - 6)  ondansetron    Tablet 4 milliGRAM(s) Oral every 6 hours PRN Nausea and/or Vomiting  traMADol 25 milliGRAM(s) Oral every 6 hours PRN Severe Pain (7 - 10)      X-Rays reviewed    CXR interpreted by me: left lung opacification, large left pleural effusion

## 2021-03-04 NOTE — MEDICAL STUDENT PROGRESS NOTE(EDUCATION) - SUBJECTIVE AND OBJECTIVE BOX
Subjective:    41 y/o female pt with a PMHx of active Hodgkin's Lymphoma (s/p one dose of APVD, not currently on chemotherapy due to adverse "anaphylactic" reaction) is being evaluated for SOB s/p left pleural effusion. Pt found to have complete opacification of the left lung with deviation of the trachea to the right side. Pt underwent a thoracocentesis on 2/27/21 in the ED that meghna 570 mL and the next day, Pt had a repeat thoracocentesis that meghna 1500mL on 2/28/21.    This is hospital day 5 for this patient. Pt states that her cough has shifted from a dry cough to a productive cough with clear to yellow sputum. She also states that she has been getting SOB with minimal exertion, more specifically walking to the bathroom. In addition, she notes mild abd pain in the epigastrium and left upper abd. States that the pain comes and goes and the tramadol has been helping with the pain. +night sweats. Denies diarrhea, vomiting, fevers, chills, bloody sputum     REVIEW OF SYSTEMS:    General: +night sweats, Denies fever, chills, 	  	  ENMT: +throat tightness. Denies sore throat     Respiratory and Thorax: +cough, +sputum, +SOB. Denies wheezing, bloody sputum   	  Cardiovascular:	Denies chest pain, palpitations     Gastrointestinal:	Denies nausea, vomiting, diarrhea     Genitourinary: Denies urinary symptoms     Musculoskeletal:	 Denies back pain       Objective:     LOS: 5d    VITALS:   T(C): 35.9 (03-04-21 @ 08:29), Max: 36.1 (03-03-21 @ 21:14)  HR: 89 (03-04-21 @ 08:29) (86 - 107)  BP: 97/58 (03-04-21 @ 08:29) (87/50 - 114/61)  RR: 18 (03-04-21 @ 08:29) (18 - 18)  SpO2: --    Labs:                       9.5    10.15 )-----------( 371      ( 04 Mar 2021 05:24 )             31.2     03-04    142  |  102  |  16  ----------------------------<  104<H>  4.9   |  29  |  0.6<L>    Ca    9.1      04 Mar 2021 05:24  Mg     2.0     03-04    TPro  5.6<L>  /  Alb  2.8<L>  /  TBili  <0.2  /  DBili  x   /  AST  13  /  ALT  14  /  AlkPhos  82  03-04      Radiology:    EXAM:  XR CHEST PORTABLE ROUTINE 1V            PROCEDURE DATE:  03/03/2021        INTERPRETATION:  STUDY INDICATION: Shortness of Breath    TECHNIQUE:  Portable frontal view of the chest obtained.    COMPARISON: XR  3/2/2021.    FINDINGS/  IMPRESSION:    Obscured cardiomediastinal silhouette. Increased loculated left pleural effusion and parenchymal opacities with near complete opacification of the left hemithorax. No radiographically evident pneumothorax. Unchanged osseous structures.      Physical Exam:  GENERAL: NAD, lying in bed comfortably  HEAD:  Atraumatic, Normocephalic  EYES: EOMI, PERRLA, conjunctiva and sclera clear  ENT: Moist mucous membranes  NECK: Supple, No JVD, bilateral lymphadenopathy on anterior and posterior neck, large lymph node palpated on right anterior neck   CHEST/LUNG: +cough, decreased breath sounds on left lung field; No rales, rhonchi, wheezing, or rubs. Unlabored respirations  HEART: Regular rate and rhythm; No murmurs, rubs, or gallops  ABDOMEN: BSx4; Soft, +pain to palpation of left upper quadrant and epigastrium, nondistended  EXTREMITIES:  2+ Peripheral Pulses, brisk capillary refill. No clubbing, cyanosis, or edema  NERVOUS SYSTEM:  A&Ox3, no focal deficits   SKIN: No rashes or lesions, +swelling to left breast, tenderness to palpation no erythema, no discharge Subjective:    39 y/o female pt with a PMHx of active Hodgkin's Lymphoma (s/p one dose of APVD, not currently on chemotherapy due to adverse "anaphylactic" reaction) is being evaluated for SOB s/p left pleural effusion. Pt found to have complete opacification of the left lung with deviation of the trachea to the right side. Pt underwent a thoracocentesis on 2/27/21 in the ED that meghna 570 mL and the next day, Pt had a repeat thoracocentesis that meghna 1500mL on 2/28/21.    This is hospital day 5 for this patient. Pt states that her cough has shifted from a dry cough to a productive cough with clear to yellow sputum. She also states that she has been getting SOB with minimal exertion, more specifically walking to the bathroom. In addition, she notes mild abd pain in the epigastrium and left upper abd. States that the pain comes and goes and the tramadol has been helping with the pain. +night sweats. Denies diarrhea, vomiting, fevers, chills, bloody sputum     REVIEW OF SYSTEMS:    General: +night sweats, Denies fever, chills, 	  	  ENMT: +throat tightness. Denies sore throat     Respiratory and Thorax: +cough, +sputum, +SOB. Denies wheezing, bloody sputum   	  Cardiovascular:	Denies chest pain, palpitations     Gastrointestinal:	Denies nausea, vomiting, diarrhea     Genitourinary: Denies urinary symptoms     Musculoskeletal:	 Denies back pain       Objective:     LOS: 5d    VITALS:   T(C): 35.9 (03-04-21 @ 08:29), Max: 36.1 (03-03-21 @ 21:14)  HR: 89 (03-04-21 @ 08:29) (86 - 107)  BP: 97/58 (03-04-21 @ 08:29) (87/50 - 114/61)  RR: 18 (03-04-21 @ 08:29) (18 - 18)  SpO2: --    Labs:                       9.5    10.15 )-----------( 371      ( 04 Mar 2021 05:24 )             31.2     03-04    142  |  102  |  16  ----------------------------<  104<H>  4.9   |  29  |  0.6<L>    Ca    9.1      04 Mar 2021 05:24  Mg     2.0     03-04    TPro  5.6<L>  /  Alb  2.8<L>  /  TBili  <0.2  /  DBili  x   /  AST  13  /  ALT  14  /  AlkPhos  82  03-04      Radiology:    EXAM:  XR CHEST PORTABLE ROUTINE 1V            PROCEDURE DATE:  03/03/2021        INTERPRETATION:  STUDY INDICATION: Shortness of Breath    TECHNIQUE:  Portable frontal view of the chest obtained.    COMPARISON: XR  3/2/2021.    FINDINGS/  IMPRESSION:    Obscured cardiomediastinal silhouette. Increased loculated left pleural effusion and parenchymal opacities with near complete opacification of the left hemithorax. No radiographically evident pneumothorax. Unchanged osseous structures.      Physical Exam:  GENERAL: NAD, lying in bed comfortably  HEAD:  Atraumatic, Normocephalic  EYES: EOMI, PERRLA, conjunctiva and sclera clear  ENT: Moist mucous membranes  NECK: Supple, No JVD, bilateral lymphadenopathy on anterior and posterior neck, large lymph node palpated on right anterior neck   CHEST/LUNG: +cough, absent breath sounds on left lung field; No rales, rhonchi, wheezing, or rubs. Unlabored respirations  HEART: Regular rate and rhythm; No murmurs, rubs, or gallops  ABDOMEN: BSx4; Soft, +pain to palpation of left upper quadrant and epigastrium, nondistended  EXTREMITIES:  2+ Peripheral Pulses, brisk capillary refill. No clubbing, cyanosis, or edema  NERVOUS SYSTEM:  A&Ox3, no focal deficits   SKIN: No rashes or lesions, +swelling to left breast, tenderness to palpation no erythema, no discharge Subjective:    39 y/o female pt with a PMHx of active Hodgkin's Lymphoma (s/p one dose of APVD, not currently on chemotherapy due to adverse "anaphylactic" reaction) is being evaluated for SOB s/p left pleural effusion. Pt found to have complete opacification of the left lung with deviation of the trachea to the right side. Pt underwent a thoracocentesis on 2/27/21 in the ED that meghna 570 mL and the next day, Pt had a repeat thoracocentesis that meghna 1500mL on 2/28/21.    This is hospital day 5 for this patient. Pt states that her cough has shifted from a dry cough to a productive cough with clear to yellow sputum. She also states that she has been getting SOB with minimal exertion, more specifically walking to the bathroom. In addition, she notes mild abd pain in the epigastrium and right upper abd. States that the pain comes and goes and the tramadol has been helping with the pain. +night sweats. Denies diarrhea, vomiting, fevers, chills, bloody sputum     REVIEW OF SYSTEMS:    General: +night sweats, Denies fever, chills, 	  	  ENMT: +throat tightness. Denies sore throat     Respiratory and Thorax: +cough, +sputum, +SOB. Denies wheezing, bloody sputum   	  Cardiovascular:	Denies chest pain, palpitations     Gastrointestinal:	Denies nausea, vomiting, diarrhea     Genitourinary: Denies urinary symptoms     Musculoskeletal:	 Denies back pain       Objective:     LOS: 5d    VITALS:   T(C): 35.9 (03-04-21 @ 08:29), Max: 36.1 (03-03-21 @ 21:14)  HR: 89 (03-04-21 @ 08:29) (86 - 107)  BP: 97/58 (03-04-21 @ 08:29) (87/50 - 114/61)  RR: 18 (03-04-21 @ 08:29) (18 - 18)  SpO2: --    Labs:                       9.5    10.15 )-----------( 371      ( 04 Mar 2021 05:24 )             31.2     03-04    142  |  102  |  16  ----------------------------<  104<H>  4.9   |  29  |  0.6<L>    Ca    9.1      04 Mar 2021 05:24  Mg     2.0     03-04    TPro  5.6<L>  /  Alb  2.8<L>  /  TBili  <0.2  /  DBili  x   /  AST  13  /  ALT  14  /  AlkPhos  82  03-04      Radiology:    EXAM:  XR CHEST PORTABLE ROUTINE 1V            PROCEDURE DATE:  03/03/2021        INTERPRETATION:  STUDY INDICATION: Shortness of Breath    TECHNIQUE:  Portable frontal view of the chest obtained.    COMPARISON: XR  3/2/2021.    FINDINGS/  IMPRESSION:    Obscured cardiomediastinal silhouette. Increased loculated left pleural effusion and parenchymal opacities with near complete opacification of the left hemithorax. No radiographically evident pneumothorax. Unchanged osseous structures.      Physical Exam:  GENERAL: NAD, lying in bed comfortably  HEAD:  Atraumatic, Normocephalic  EYES: EOMI, PERRLA, conjunctiva and sclera clear  ENT: Moist mucous membranes  NECK: Supple, No JVD, bilateral lymphadenopathy on anterior and posterior neck, large lymph node palpated on right anterior neck   CHEST/LUNG: +cough, absent breath sounds on left lung field; No rales, rhonchi, wheezing, or rubs. Unlabored respirations  HEART: Regular rate and rhythm; No murmurs, rubs, or gallops  ABDOMEN: BSx4; Soft, +pain to palpation of ight upper quadrant and epigastrium, nondistended  EXTREMITIES:  2+ Peripheral Pulses, brisk capillary refill. No clubbing, cyanosis, or edema  NERVOUS SYSTEM:  A&Ox3, no focal deficits   SKIN: No rashes or lesions, +swelling to left breast, tenderness to palpation no erythema, no discharge

## 2021-03-04 NOTE — PROGRESS NOTE ADULT - ASSESSMENT
Impression  Large exudative left pleural effusion s/p thora in ER (02/27) and repeat thora (02/28), now with reaccumulation of fluid  HO Hodgkin lymphoma (s/p 1 dose ABVD)    Recommendations  Pigtail catheter and pleurodesis today  HOB at 45  Aspiration precautions  DVT ppx  FU with heme onc Impression  Large exudative left pleural effusion s/p thora in ER (02/27) and repeat thora (02/28), now with reaccumulation of fluid  HO Hodgkin lymphoma (s/p 1 dose ABVD)    Recommendations  FU SW in regards to pleurX  HOB at 45  Aspiration precautions  DVT ppx  FU with heme onc Impression  Large exudative left pleural effusion s/p thora in ER (02/27) and repeat thora (02/28), now with reaccumulation of fluid  HO Hodgkin lymphoma (s/p 1 dose ABVD)    Recommendations  If PleurX supplies are a covered service by her Insurance, The patient prefers PleurX Cateheter over other drainage and pleurodesis options ( surgical pleurodesis or chest tube and chemical pleurodesis )   DW with CTS   HOB at 45  Aspiration precautions  DVT ppx  FU with heme onc

## 2021-03-04 NOTE — MEDICAL STUDENT PROGRESS NOTE(EDUCATION) - NS MD HP STUD ASPLAN ASSES FT
41 y/o female with a past medical history of Non- Hodgkin's Lymphoma which is currently active, s/p one dose of ABVD (stopped after one dose due to a bad allergic reaction, which she describes as anaphylactic) presenting for progressively worsening SOB, from SOB on exertion to SOB at rest. Pt found to have complete opacification of the left lung with deviation of the trachea to the right side. Pt underwent a thoracocentesis on 2/27/21 in the ED that meghna 570 mL and the next day, Pt had a repeat thoracocentesis that meghna 1500mL on 2/28/21.
39 y/o female with a past medical history of Hodgkin's Lymphoma which is currently active, s/p one dose of ABVD (stopped after one dose due to a bad allergic reaction, which she describes as anaphylactic) presenting for progressively worsening SOB, from SOB on exertion to SOB at rest. Pt found to have complete opacification of the left lung with deviation of the trachea to the right side. Pt underwent a thoracocentesis on 2/27/21 in the ED that meghna 570 mL and the next day, Pt had a repeat thoracocentesis that meghna 1500mL on 2/28/21.
39 y/o female pt with a PMHx of active Hodgkin's Lymphoma (s/p one dose of APVD, not currently on chemotherapy due to adverse "anaphylactic" reaction) is being evaluated for SOB s/p left pleural effusion. Pt found to have complete opacification of the left lung with deviation of the trachea to the right side. Pt underwent a thoracocentesis on 2/27/21 in the ED that meghna 570 mL and the next day, Pt had a repeat thoracocentesis that meghna 1500mL on 2/28/21. On x-ray 3/3/21, pt found to have reaccumulation of fluid in left pleural space.
41 y/o female with a past medical history of Non- Hodgkin's Lymphoma which is currently active, s/p one dose of ABVD (stopped after one dose due to a bad allergic reaction, which she describes as anaphylactic) presenting for progressively worsening SOB, from SOB on exertion to SOB at rest. Pt found to have complete opacification of the left lung with deviation of the trachea to the right side. Pt underwent a thoracocentesis on 2/27/21 in the ED that meghna 570 mL and the next day, Pt had a repeat thoracocentesis that meghna 1500mL on 2/28/21.

## 2021-03-04 NOTE — PROGRESS NOTE ADULT - SUBJECTIVE AND OBJECTIVE BOX
DESTINEECLARA GUILLEN  40y  Female  ***My note supersedes ALL resident notes that I sign.  My corrections for their notes are in my note.***    I can be reached directly on R[employer]V [id number]. My office number is [phone]. My personal cell number is [phone].    INTERVAL EVENTS: Here for f/u of hodgkin's dz. Pt is the same. We had long talk about pl eff and plans and trying to expedite everything to get her to the immunotx.    T(F): 97.3 (03-04-21 @ 14:00), Max: 97.3 (03-04-21 @ 14:00)  HR: 97 (03-04-21 @ 14:00) (86 - 102)  BP: 94/66 (03-04-21 @ 14:00) (87/50 - 114/61)  RR: 18 (03-04-21 @ 14:00) (18 - 18)  SpO2: 97% (03-04-21 @ 14:00) (97% - 97%)    Gen: NAD  HEENT: PERRL, EOMI, mouth clr, nose clr  Neck: + b/l cerv nodes, no JVD, thyroid nl  lungs: decr BS on entire left side; rt side clr  hrt: s1 s2 rrr no murmur  abd: soft, NT/ND, no HS megaly  ext: no edema, no c/c  neuro: aa ox3, cn intact, can move all 4 ext    LABS:                      9.5     (    80.0   10.15 )-----------( ---------      371      ( 04 Mar 2021 05:24 )             31.2    (    15.3     142   (   102   (   104      03-04-21 @ 05:24  ----------------------               4.9   (   29   (   16                             -----                        0.6  Ca  9.1   Mg  2.0    P   --     LFT  5.6  (  <0.2  (  13       03-04-21 @ 05:24  -------------------------  2.8  (  82  (  14    CAPILLARY BLOOD GLUCOSE  POCT Blood Glucose.: 141 (03-04-21 @ 11:30)  POCT Blood Glucose.: 112 (03-04-21 @ 07:39)  POCT Blood Glucose.: 143 (03-03-21 @ 11:45)  POCT Blood Glucose.: 97 (03-03-21 @ 07:45)    Culture - Body Fluid with Gram Stain (collected 02-28-21 @ 09:20)  Source: .Body Fluid Pleural Fluid  Gram Stain (02-28-21 @ 22:50):    polymorphonuclear leukocytes seen    No organisms seen    by cytocentrifuge  Preliminary Report (03-01-21 @ 16:13):    No growth    Culture - Body Fluid with Gram Stain (collected 02-27-21 @ 02:43)  Source: .Body Fluid Pleural Fluid  Gram Stain (02-27-21 @ 13:31):    polymorphonuclear leukocytes seen per low power field    No organisms seen per oil power field    by cytocentrifuge  Final Report (03-04-21 @ 10:44):    No growth at 5 days    RADIOLOGY & ADDITIONAL TESTS:  < from: Xray Chest 1 View- PORTABLE-Urgent (Xray Chest 1 View- PORTABLE-Urgent .) (03.04.21 @ 08:53) >  Impression:    Redemonstration of large left pleural effusion and adjacent opacities slightly decreased from prior.    < end of copied text >      MEDICATIONS:    chlorhexidine 4% Liquid 1 Application(s) Topical <User Schedule>  enoxaparin Injectable 40 milliGRAM(s) SubCutaneous at bedtime  guaifenesin/dextromethorphan  Syrup 5 milliLiter(s) Oral every 6 hours PRN  melatonin 5 milliGRAM(s) Oral at bedtime  naproxen 250 milliGRAM(s) Oral every 12 hours PRN  ondansetron    Tablet 4 milliGRAM(s) Oral every 6 hours PRN  pantoprazole    Tablet 40 milliGRAM(s) Oral before breakfast  senna 2 Tablet(s) Oral at bedtime  simethicone 80 milliGRAM(s) Chew every 6 hours  traMADol 25 milliGRAM(s) Oral every 6 hours PRN

## 2021-03-04 NOTE — PROGRESS NOTE ADULT - ASSESSMENT
40 year old woman with Hx of Hodgkin lymphoma (Active, S/p one dose of ABVD w/ anaphylactic rxn) p/w SOB, night sweats, fevers and L sided non radiating pleuritic chest pain. Found to have a large L pleural effusion.    # LABS ARE MOSTLY OK - CAN LIMIT LAB WORK     # Hodgkin Lymphoma w/ very extensive dz on PET scan in neck, mediastinum and thorax w/ tracheal dev to rt; very large (and recurrent) lt pl eff (cytology neg); sm rt pl eff and sm pericard eff  cough is from cancer and pl effusions  s/p thoracentesis x2, which caused PTX, which might be resolved now on latest xray  Diagnosed 8/2020 - and still not adequately treated (s/p 1x treatment w/ ABVD w/ reported anaphylactic reaction)  h/o is planning on immunoTx as outpt -> previously spoke w/ Dr Childress: pt got ABVD w/ Dr Schultz at [facility] and had bad rxn; pt was offered a re-trial of the chemo as inpt, but pt declined; Dr Childress was called as 2nd opinion and is offering 2nd line tx w/ immunoTx (pembrolizumab); this has to be given as outpt, so we need to d/c pt to get there  pl eff on left is recurrent: pt presented new, secondary insurance _> will see if new insurance approves collection canisters for a pleurX cath; if not, Pulm will f/u w/ CT Sx to see if surgical pleurodesis would provide adequate answer to this problem  pt is on room air, but has easy ESCALANTE and still tachy w/ ambulation -> pt cannot go home w/out effusion issue resolved (she will be readmitted quickly)  robitussin DM for cough   Tramadol for pain control - pt says this is working  naprosyn for pain or fever    # microcytic Anemia  iron studies are not truly c/w THOMPSON   s/p Venofer  no need for further iron    # abd pain - non-specific -> I believe it is due to her malignancy  pt had H pylori a few mo ago  f/u stool for HP Ag  try simethicone  cont PPI  zofran prn N/V    Dispo: f/u CM re ins approval for PleurX canisters; CT Sx eval for Sx pleurodesis (as back up plan); f/u stool H Pylori; tx pain; outpt h/o f/u for immunoTx

## 2021-03-04 NOTE — PROGRESS NOTE ADULT - SUBJECTIVE AND OBJECTIVE BOX
comfortable at rest afebrile   tachycardia and sob on ambulation   waiting for procedure tofix pnmeumo and pleural effusion   will be seen by thoracic surgery

## 2021-03-04 NOTE — MEDICAL STUDENT PROGRESS NOTE(EDUCATION) - NS MD HP STUD ASPLAN PLAN FT
#Hodgkin's lymphoma:  - Diagnosed back in Aug 2020   - Used to follow with Dr. Schultz for APVD treatment, but after one bad reaction to treatment, pt chose to forego chemotherapy and do holistic treatments   - Will follow up with Dr. Childress outpatient to start immunotherapy (Pembrolizumab)    #Left Pleural effusion with cough  - chest Xray on admission showed complete opacification of the Left lung with deviation of the trachea to the right side  - Thoracocentesis on 2/27/21 in the ED that meghna 570 mL and the next day, Pt had a repeat thoracocentesis that meghna 1500mL on 2/28/21  - chest x-ray 3/1/21 revealed: Left apical curvilinear density suspicious for pneumothorax  - Repeat chest xray 3/2/21 showed worsening pneumothorax as per conversation with radiologist (approximately 5mm to 1cm)  - Due worsening pneumothorax, pt was put on 100% oxygen non-rebreather  - Pleural fluid analysis shows an exudative picture   - Chest x-ray 3/3/21: reaccumulation of exudative pleural fluid, no radiographic evidence of pneumothorax  - Chest x-ray 3/4/21: pleural fluid occluding entire left hemithorax    - pt is saturating well on room air, no hypoxia   - tachy with ambulation  - robitussin DM for cough  - Tramadol for pain control   - naprosyn for fever and pain   - Pulm following: insurance not approving collection canisters for a pleurx cath; pulm to speak with CT sx to see if pleurodesis would be adequate     #Microcytic anemia   - s/p Venlofer x1 3/1/21  - total Serum iron 3/1/21: 29   - hgb: 11.4 @ 3/2/21, hgb: 10.2 @ 3/3/21, hgb: 9.5 @ 3/4/21  - No need for iron    #abd pain - non specific   - hx of h pylori treated 2 months ago   - H. pylori stool antigen test: negative for H pylori  - Tramadol for pain control PRN  - started simethicone  #Left Pleural effusion with cough  #Pneumothorax  - chest Xray on admission showed complete opacification of the Left lung with deviation of the trachea to the right side  - Thoracocentesis on 2/27/21 in the ED that meghna 570 mL and the next day, Pt had a repeat thoracocentesis that meghna 1500mL on 2/28/21  - chest x-ray 3/1/21 revealed: Left apical curvilinear density suspicious for pneumothorax  - Repeat chest xray 3/2/21 showed worsening pneumothorax as per conversation with radiologist (approximately 5mm to 1cm)  - Due worsening pneumothorax, pt was put on 100% oxygen non-rebreather  - Pleural fluid analysis shows an exudative picture   - Chest x-ray 3/3/21: reaccumulation of exudative pleural fluid, no radiographic evidence of pneumothorax  - Chest x-ray 3/4/21: pleural fluid occluding entire left hemithorax    - pt is saturating well on room air, no hypoxia   - tachy with ambulation  - robitussin DM for cough  - Tramadol for pain control   - naprosyn for fever and pain   - Pulm following: insurance not approving collection canisters for a pleurx cath; pulm to speak with CT sx to see if pleurodesis would be adequate     #Hodgkin's lymphoma:  - Diagnosed back in Aug 2020   - Used to follow with Dr. Schultz for APVD treatment, but after one bad reaction to treatment, pt chose to forego chemotherapy and do holistic treatments   - Will follow up with Dr. Childress outpatient to start immunotherapy (Pembrolizumab)    #Microcytic anemia   - s/p Venlofer x1 3/1/21  - total Serum iron 3/1/21: 29   - hgb: 11.4 @ 3/2/21, hgb: 10.2 @ 3/3/21, hgb: 9.5 @ 3/4/21  - No need for iron    #abd pain - non specific   - hx of h pylori treated 2 months ago   - H. pylori stool antigen test: negative for H pylori  - Tramadol for pain control PRN  - started simethicone     #Diet: DASH  #Activity: IAT   #DVT prophylaxis: Lovenox   #CHG order  #Code: Full    #Left Pleural effusion with cough  #Pneumothorax  - chest Xray on admission showed complete opacification of the Left lung with deviation of the trachea to the right side  - Thoracocentesis on 2/27/21 in the ED that meghna 570 mL and the next day, Pt had a repeat thoracocentesis that meghna 1500mL on 2/28/21  - chest x-ray 3/1/21 revealed: Left apical curvilinear density suspicious for pneumothorax  - Repeat chest xray 3/2/21 showed worsening pneumothorax as per conversation with radiologist (approximately 5mm to 1cm)  - Due worsening pneumothorax, pt was put on 100% oxygen non-rebreather  - Pleural fluid analysis shows an exudative picture   - Chest x-ray 3/3/21: reaccumulation of exudative pleural fluid, no radiographic evidence of pneumothorax  - Chest x-ray 3/4/21: pleural fluid occluding entire left hemithorax    - pt is saturating well on room air, no hypoxia   - tachy with ambulation  - robitussin DM for cough  - Tramadol for pain control   - naprosyn for fever and pain   - Pulm following: insurance not approving collection canisters for a pleurx cath; pulm to speak with CT sx to see if pleurodesis would be adequate --> patient doesnt want pleurodesis; will pay out of pocket for pleurex    #Hodgkin's lymphoma:  - Diagnosed back in Aug 2020   - Used to follow with Dr. Schultz for APVD treatment, but after one bad reaction to treatment, pt chose to forego chemotherapy and do holistic treatments   - Will follow up with Dr. Childress outpatient to start immunotherapy (Pembrolizumab)    #Microcytic anemia   - s/p Venlofer x1 3/1/21  - total Serum iron 3/1/21: 29   - hgb: 11.4 @ 3/2/21, hgb: 10.2 @ 3/3/21, hgb: 9.5 @ 3/4/21  - No need for iron    #abd pain - non specific   - hx of h pylori treated 2 months ago   - H. pylori stool antigen test: negative for H pylori  - Tramadol for pain control PRN  - started simethicone     #Diet: DASH  #Activity: IAT   #DVT prophylaxis: Lovenox   #CHG order  #Code: Full

## 2021-03-04 NOTE — PROGRESS NOTE ADULT - ASSESSMENT
advanced stage hodgkins disease  pleural effusion ,pneumothorax   resp distress on exertion ok at bed rest   anemia  will do procedure tomorrow by thoracic surgery   will follow   possible immunotherapy as out pt     zach cortés MD   [phone]

## 2021-03-05 ENCOUNTER — RESULT REVIEW (OUTPATIENT)
Age: 41
End: 2021-03-05

## 2021-03-05 LAB
ANION GAP SERPL CALC-SCNC: 10 MMOL/L — SIGNIFICANT CHANGE UP (ref 7–14)
B PERT IGG+IGM PNL SER: CLEAR — SIGNIFICANT CHANGE UP
BUN SERPL-MCNC: 11 MG/DL — SIGNIFICANT CHANGE UP (ref 10–20)
CALCIUM SERPL-MCNC: 8.9 MG/DL — SIGNIFICANT CHANGE UP (ref 8.5–10.1)
CHLORIDE SERPL-SCNC: 98 MMOL/L — SIGNIFICANT CHANGE UP (ref 98–110)
CO2 SERPL-SCNC: 28 MMOL/L — SIGNIFICANT CHANGE UP (ref 17–32)
COLOR FLD: YELLOW — SIGNIFICANT CHANGE UP
CREAT SERPL-MCNC: 0.7 MG/DL — SIGNIFICANT CHANGE UP (ref 0.7–1.5)
CULTURE RESULTS: SIGNIFICANT CHANGE UP
EOSINOPHIL # FLD: SIGNIFICANT CHANGE UP %
FLUID INTAKE SUBSTANCE CLASS: SIGNIFICANT CHANGE UP
FLUID SEGMENTED GRANULOCYTES: SIGNIFICANT CHANGE UP %
GLUCOSE SERPL-MCNC: 105 MG/DL — HIGH (ref 70–99)
GRAM STN FLD: SIGNIFICANT CHANGE UP
LYMPHOCYTES # FLD: SIGNIFICANT CHANGE UP
MONOS+MACROS # FLD: SIGNIFICANT CHANGE UP %
NON-GYNECOLOGICAL CYTOLOGY STUDY: SIGNIFICANT CHANGE UP
POTASSIUM SERPL-MCNC: 4.5 MMOL/L — SIGNIFICANT CHANGE UP (ref 3.5–5)
POTASSIUM SERPL-SCNC: 4.5 MMOL/L — SIGNIFICANT CHANGE UP (ref 3.5–5)
RCV VOL RI: 1000 /UL — HIGH (ref 0–0)
SODIUM SERPL-SCNC: 136 MMOL/L — SIGNIFICANT CHANGE UP (ref 135–146)
SPECIMEN SOURCE: SIGNIFICANT CHANGE UP
SPECIMEN SOURCE: SIGNIFICANT CHANGE UP
TOTAL NUCLEATED CELL COUNT, BODY FLUID: 1342 /UL — SIGNIFICANT CHANGE UP
TUBE TYPE: SIGNIFICANT CHANGE UP

## 2021-03-05 PROCEDURE — 99232 SBSQ HOSP IP/OBS MODERATE 35: CPT | Mod: 25

## 2021-03-05 PROCEDURE — 88305 TISSUE EXAM BY PATHOLOGIST: CPT | Mod: 26

## 2021-03-05 PROCEDURE — 32550 INSERT PLEURAL CATH: CPT

## 2021-03-05 PROCEDURE — 99233 SBSQ HOSP IP/OBS HIGH 50: CPT

## 2021-03-05 PROCEDURE — 71045 X-RAY EXAM CHEST 1 VIEW: CPT | Mod: 26

## 2021-03-05 RX ORDER — PANTOPRAZOLE SODIUM 20 MG/1
1 TABLET, DELAYED RELEASE ORAL
Qty: 30 | Refills: 0
Start: 2021-03-05 | End: 2021-04-03

## 2021-03-05 RX ORDER — LANOLIN ALCOHOL/MO/W.PET/CERES
1 CREAM (GRAM) TOPICAL
Qty: 30 | Refills: 0
Start: 2021-03-05 | End: 2021-04-03

## 2021-03-05 RX ORDER — KETOROLAC TROMETHAMINE 30 MG/ML
30 SYRINGE (ML) INJECTION ONCE
Refills: 0 | Status: DISCONTINUED | OUTPATIENT
Start: 2021-03-05 | End: 2021-03-05

## 2021-03-05 RX ORDER — MORPHINE SULFATE 50 MG/1
2 CAPSULE, EXTENDED RELEASE ORAL ONCE
Refills: 0 | Status: DISCONTINUED | OUTPATIENT
Start: 2021-03-05 | End: 2021-03-05

## 2021-03-05 RX ORDER — SIMETHICONE 80 MG/1
1 TABLET, CHEWABLE ORAL
Qty: 56 | Refills: 0
Start: 2021-03-05 | End: 2021-03-18

## 2021-03-05 RX ORDER — DIPHENHYDRAMINE HCL 50 MG
25 CAPSULE ORAL ONCE
Refills: 0 | Status: COMPLETED | OUTPATIENT
Start: 2021-03-05 | End: 2021-03-05

## 2021-03-05 RX ORDER — TRAMADOL HYDROCHLORIDE 50 MG/1
25 TABLET ORAL EVERY 6 HOURS
Refills: 0 | Status: DISCONTINUED | OUTPATIENT
Start: 2021-03-05 | End: 2021-03-06

## 2021-03-05 RX ORDER — MORPHINE SULFATE 50 MG/1
4 CAPSULE, EXTENDED RELEASE ORAL EVERY 4 HOURS
Refills: 0 | Status: DISCONTINUED | OUTPATIENT
Start: 2021-03-05 | End: 2021-03-06

## 2021-03-05 RX ORDER — SENNA PLUS 8.6 MG/1
2 TABLET ORAL
Qty: 60 | Refills: 0
Start: 2021-03-05 | End: 2021-04-03

## 2021-03-05 RX ADMIN — Medication 5 MILLIGRAM(S): at 21:12

## 2021-03-05 RX ADMIN — TRAMADOL HYDROCHLORIDE 25 MILLIGRAM(S): 50 TABLET ORAL at 03:01

## 2021-03-05 RX ADMIN — Medication 30 MILLIGRAM(S): at 14:59

## 2021-03-05 RX ADMIN — MORPHINE SULFATE 2 MILLIGRAM(S): 50 CAPSULE, EXTENDED RELEASE ORAL at 12:17

## 2021-03-05 RX ADMIN — TRAMADOL HYDROCHLORIDE 25 MILLIGRAM(S): 50 TABLET ORAL at 10:00

## 2021-03-05 RX ADMIN — SIMETHICONE 80 MILLIGRAM(S): 80 TABLET, CHEWABLE ORAL at 17:37

## 2021-03-05 RX ADMIN — MORPHINE SULFATE 2 MILLIGRAM(S): 50 CAPSULE, EXTENDED RELEASE ORAL at 12:30

## 2021-03-05 RX ADMIN — SIMETHICONE 80 MILLIGRAM(S): 80 TABLET, CHEWABLE ORAL at 11:29

## 2021-03-05 RX ADMIN — TRAMADOL HYDROCHLORIDE 25 MILLIGRAM(S): 50 TABLET ORAL at 09:55

## 2021-03-05 RX ADMIN — SIMETHICONE 80 MILLIGRAM(S): 80 TABLET, CHEWABLE ORAL at 05:20

## 2021-03-05 RX ADMIN — Medication 25 MILLIGRAM(S): at 19:39

## 2021-03-05 RX ADMIN — Medication 30 MILLIGRAM(S): at 14:38

## 2021-03-05 RX ADMIN — SENNA PLUS 2 TABLET(S): 8.6 TABLET ORAL at 21:12

## 2021-03-05 RX ADMIN — TRAMADOL HYDROCHLORIDE 25 MILLIGRAM(S): 50 TABLET ORAL at 21:11

## 2021-03-05 RX ADMIN — PANTOPRAZOLE SODIUM 40 MILLIGRAM(S): 20 TABLET, DELAYED RELEASE ORAL at 05:20

## 2021-03-05 NOTE — PROGRESS NOTE ADULT - ASSESSMENT
40 year old woman with Hx of Hodgkin lymphoma (Active, S/p one dose of ABVD w/ anaphylactic rxn) p/w SOB, night sweats, fevers and L sided non radiating pleuritic chest pain. Found to have a large L pleural effusion.    # LABS ARE MOSTLY OK - CAN LIMIT LAB WORK     # Hodgkin Lymphoma w/ very extensive dz on PET scan in neck, mediastinum and thorax w/ tracheal dev to rt; very large (and recurrent) lt pl eff (cytology neg); sm rt pl eff and sm pericard eff  the pt's pleural effusions ARE related to her malignancy   cough is from cancer and pl effusions  s/p thoracentesis x2, which caused PTX, which is resolved on latest xray  Diagnosed 8/2020 -> still not adequately treated (s/p 1x treatment w/ ABVD w/ reported anaphylactic reaction)  h/o is planning on immunoTx as outpt -> previously spoke w/ Dr Childress: pt got ABVD w/ Dr Schultz at [facility] and had bad rxn; pt was offered a re-trial of the chemo as inpt, but pt declined; Dr Childress was called as 2nd opinion and is offering 2nd line tx w/ immunoTx (pembrolizumab); this has to be given as outpt, so we need to d/c pt to get there  pl eff on left is recurrent: pt's new, secondary insurance is covering collection canisters for a pleurX cath; so surgical pleurodesis on hold  pt is on room air (d/c all O2)  ambulation tolerance should improve w/ pleurX drainage  robitussin DM for cough     # Neopl-related pain and acute pain from pleurX cath  Naprosyn 250mg po q12 prn mild pain  Tramadol 25mg po q6 prn mod pain  Morphine 4mg iv q4 prn sev pain  Toradol 30mg IVP x1 now  will use all oral meds on d/c    # microcytic Anemia  iron studies are not truly c/w THOMPSON   s/p Venofer  no need for further iron    # abd pain - non-specific -> I believe it is due to her malignancy  pt had H pylori a few mo ago  stool for HP Ag is NEG now  cont simethicone  cont PPI  zofran prn N/V    Dispo: f/u CM re PleurX canisters; tx pain; outpt h/o f/u for immunoTx; anticipate d/c home emani

## 2021-03-05 NOTE — PROGRESS NOTE ADULT - ASSESSMENT
41 y/o female with a past medical history of Non- Hodgkin's Lymphoma which is currently active, s/p one dose of ABVD (stopped after one dose due to a bad allergic reaction, which she describes as anaphylactic) presenting for progressively worsening SOB, from SOB on exertion to SOB at rest. Pt found to have complete opacification of the left lung with deviation of the trachea to the right side. Pt underwent a thoracocentesis on 2/27/21 in the ED that meghna 570 mL and the next day, Pt had a repeat thoracocentesis that meghna 1500mL on 2/28/21.    #Left Pleural effusion with cough  #Pneumothorax  - chest Xray on admission showed complete opacification of the Left lung with deviation of the trachea to the right side  - Thoracocentesis on 2/27/21 in the ED that meghna 570 mL and the next day, Pt had a repeat thoracocentesis that meghna 1500mL on 2/28/21  - chest x-ray 3/1/21 revealed: Left apical curvilinear density suspicious for pneumothorax  - Repeat chest xray 3/2/21 showed worsening pneumothorax as per conversation with radiologist (approximately 5mm to 1cm)  - Due worsening pneumothorax, pt was put on 100% oxygen non-rebreather  - Pleural fluid analysis shows an exudative picture   - Chest x-ray 3/3/21: reaccumulation of exudative pleural fluid, no radiographic evidence of pneumothorax  - Chest x-ray 3/4/21: pleural fluid occluding entire left hemithorax    - pt is saturating well on room air, no hypoxia   - tachy with ambulation  - robitussin DM for cough  - Tramadol for pain control   - naprosyn for fever and pain   - s/p pleurex catheter with 1000cc drained during procedure  - F/U pleural studies    #Hodgkin's lymphoma:  - Diagnosed back in Aug 2020   - Used to follow with Dr. Schultz for APVD treatment, but after one bad reaction to treatment, pt chose to forego chemotherapy and do holistic treatments   - Will follow up with Dr. Childress outpatient to start immunotherapy (Pembrolizumab)    #Microcytic anemia   - s/p Venlofer x1 3/1/21  - total Serum iron 3/1/21: 29   - hgb: 11.4 @ 3/2/21, hgb: 10.2 @ 3/3/21, hgb: 9.5 @ 3/4/21  - No need for iron    #abd pain - non specific   - hx of h pylori treated 2 months ago   - H. pylori stool antigen test: negative for H pylori  - Tramadol for pain control PRN  - started simethicone     #Diet: DASH  #Activity: IAT   #DVT prophylaxis: Lovenox held  #CHG order  #Code: Full

## 2021-03-05 NOTE — PROGRESS NOTE ADULT - SUBJECTIVE AND OBJECTIVE BOX
Patient is a 40y old  Female who presents with a chief complaint of Shortness of breath (04 Mar 2021 18:42)        SUBJECTIVE:  S/p PleurX catheter placement with no complications. Patient tolerated procedure. During procedure, 1000cc was drained and sent for culture, flow cytometry, and cytology.     PHYSICAL EXAM  Vital Signs Last 24 Hrs  T(C): 36.6 (05 Mar 2021 07:25), Max: 37.6 (04 Mar 2021 20:11)  T(F): 97.9 (05 Mar 2021 07:25), Max: 99.6 (04 Mar 2021 20:11)  HR: 102 (05 Mar 2021 08:32) (85 - 102)  BP: 102/67 (05 Mar 2021 08:32) (86/53 - 115/75)  BP(mean): --  RR: 20 (05 Mar 2021 08:32) (18 - 20)  SpO2: 95% (05 Mar 2021 08:32) (95% - 100%)    CONSTITUTIONAL:  Well nourished.  NAD    ENT:   Airway patent,   No thrush    EYES:   Clear bilaterally,   pupils equal, round and reactive to light.    CARDIAC:   Normal rate,   regular rhythm.    no edema    CAROTID:   normal systolic impulse  no bruits    RESPIRATORY:   RA, satting 96%  Inspection- L pleurx catheter in place  Palpation- normal chest wall expansion  Auscultation- clear to auscultation bilaterally   No wheezing  Not tachypneic,  No use of accessory muscles    GASTROINTESTINAL:  Abdomen soft,   non-tender,   no guarding,   + BS    GENITOURINARY  normal genitalia for sex  no edema    MUSCULOSKELETAL:   range of motion is not limited,  no clubbing, cyanosis    NEUROLOGICAL:   AOx4  Follows commands  Moves all extremities   Alert and oriented   no motor  deficits.    SKIN:   Skin normal color for race,   No evidence of rash.    PSYCHIATRIC:   normal mood and affect.   no apparent risk to self or others.        LABS:                          10.0   9.11  )-----------( 345      ( 04 Mar 2021 21:23 )             32.4                                               03-04    136  |  99  |  11  ----------------------------<  122<H>  5.1<H>   |  30  |  0.7    Ca    8.8      04 Mar 2021 21:23  Mg     1.8     03-04    TPro  5.8<L>  /  Alb  3.1<L>  /  TBili  <0.2  /  DBili  x   /  AST  16  /  ALT  16  /  AlkPhos  85  03-04      PT/INR - ( 04 Mar 2021 21:23 )   PT: 15.00 sec;   INR: 1.30 ratio         PTT - ( 04 Mar 2021 21:23 )  PTT:34.7 sec                                                                                     LIVER FUNCTIONS - ( 04 Mar 2021 21:23 )  Alb: 3.1 g/dL / Pro: 5.8 g/dL / ALK PHOS: 85 U/L / ALT: 16 U/L / AST: 16 U/L / GGT: x                                                                                                MEDICATIONS  (STANDING):  chlorhexidine 4% Liquid 1 Application(s) Topical <User Schedule>  melatonin 5 milliGRAM(s) Oral at bedtime  pantoprazole    Tablet 40 milliGRAM(s) Oral before breakfast  senna 2 Tablet(s) Oral at bedtime  simethicone 80 milliGRAM(s) Chew every 6 hours    MEDICATIONS  (PRN):  guaifenesin/dextromethorphan  Syrup 5 milliLiter(s) Oral every 6 hours PRN Cough  naproxen 250 milliGRAM(s) Oral every 12 hours PRN Moderate Pain (4 - 6)  ondansetron    Tablet 4 milliGRAM(s) Oral every 6 hours PRN Nausea and/or Vomiting  traMADol 25 milliGRAM(s) Oral every 6 hours PRN Severe Pain (7 - 10)      X-Rays reviewed    CXR interpreted by me: pending

## 2021-03-05 NOTE — PROGRESS NOTE ADULT - SUBJECTIVE AND OBJECTIVE BOX
SUBJECTIVE:    Patient is a 40y old Female who presents with a chief complaint of Shortness of breath (05 Mar 2021 08:43)    Currently admitted to medicine with the primary diagnosis of Pleural effusion       Today is hospital day 6d. This morning she is resting comfortably in bed and reports no new issues or overnight events. She is still complaining of abdominal pain.    PAST MEDICAL & SURGICAL HISTORY  Hodgkin lymphoma  Active      SOCIAL HISTORY:  Negative for smoking/alcohol/drug use.     ALLERGIES:  No Known Allergies    MEDICATIONS:  STANDING MEDICATIONS  chlorhexidine 4% Liquid 1 Application(s) Topical <User Schedule>  melatonin 5 milliGRAM(s) Oral at bedtime  pantoprazole    Tablet 40 milliGRAM(s) Oral before breakfast  senna 2 Tablet(s) Oral at bedtime  simethicone 80 milliGRAM(s) Chew every 6 hours    PRN MEDICATIONS  guaifenesin/dextromethorphan  Syrup 5 milliLiter(s) Oral every 6 hours PRN  naproxen 250 milliGRAM(s) Oral every 12 hours PRN  ondansetron    Tablet 4 milliGRAM(s) Oral every 6 hours PRN  traMADol 25 milliGRAM(s) Oral every 6 hours PRN    VITALS:   T(F): 97.9  HR: 102  BP: 102/67  RR: 20  SpO2: 95%    LABS:                        10.0   9.11  )-----------( 345      ( 04 Mar 2021 21:23 )             32.4     03-04    136  |  99  |  11  ----------------------------<  122<H>  5.1<H>   |  30  |  0.7    Ca    8.8      04 Mar 2021 21:23  Mg     1.8     03-04    TPro  5.8<L>  /  Alb  3.1<L>  /  TBili  <0.2  /  DBili  x   /  AST  16  /  ALT  16  /  AlkPhos  85  03-04    PT/INR - ( 04 Mar 2021 21:23 )   PT: 15.00 sec;   INR: 1.30 ratio         PTT - ( 04 Mar 2021 21:23 )  PTT:34.7 sec              RADIOLOGY:    PHYSICAL EXAM:  GENERAL: NAD, lying in bed comfortably  HEAD:  Atraumatic, Normocephalic  CHEST/LUNG: Clear to auscultation bilaterally; No rales, rhonchi, wheezing, or rubs. Unlabored respirations  HEART: Regular rate and rhythm; No murmurs, rubs, or gallops  ABDOMEN: Bowel sounds present; Soft, Nondistended. Tender to palpation in the LUQ  EXTREMITIES:  No clubbing, cyanosis, or edema  NERVOUS SYSTEM:  Alert & Oriented X3, speech clear. No deficits   MSK: FROM all 4 extremities, full and equal strength  SKIN: No rashes or lesions

## 2021-03-05 NOTE — PROGRESS NOTE ADULT - ASSESSMENT
labs and xray reviewed   hodgkins disease advanced stage   will start treatment as out pt   cytology on pleural fluid pending   dc plan as per med team   will follow with you    zach cortés MD   [phone]

## 2021-03-05 NOTE — PROGRESS NOTE ADULT - SUBJECTIVE AND OBJECTIVE BOX
CLARA LORENZANA  40y  Female  ***My note supersedes ALL resident notes that I sign.  My corrections for their notes are in my note.***    I can be reached directly on [id number]. My office number is [phone]. My personal cell number is [phone].    INTERVAL EVENTS: Here for f/u of lymphoma. Pt s/p PleurX cath today. She is having some pain in that area (expected -> will tx).  She would like to stay until tomorrow. She is OK being off O2.    T(F): 97.9 (03-05-21 @ 07:25), Max: 99.6 (03-04-21 @ 20:11)  HR: 105 (03-05-21 @ 10:21) (85 - 105)  BP: 102/67 (03-05-21 @ 08:32) (86/53 - 115/75)  RR: 20 (03-05-21 @ 08:32) (18 - 20)  SpO2: 98% (03-05-21 @ 10:21) (95% - 100%)    Gen: NAD  HEENT: PERRL, EOMI, mouth clr, nose clr  Neck: + b/l cerv nodes, no JVD, thyroid nl  lungs: decr BS left base, little improved sounds upper lt side; rt side clr; chest tube site is not bleeding  hrt: s1 s2 rrr no murmur  abd: soft, NT/ND, no HS megaly  ext: no edema, no c/c  neuro: aa ox3, cn intact, can move all 4 ext    LABS:                      10.0    (    79.2   9.11  )-----------( ---------      345      ( 04 Mar 2021 21:23 )             32.4    (    15.4     136   (   99   (   122      03-04-21 @ 21:23  ----------------------               5.1   (   30   (   11                             -----                        0.7  Ca  8.8   Mg  1.8    P   --     LFT  5.8  (  <0.2  (  16       03-04-21 @ 21:23  -------------------------  3.1  (  85  (  16    PT/INR - ( 04 Mar 2021 21:23 )   PT: 15.00 sec;   INR: 1.30 ratio    PTT - ( 04 Mar 2021 21:23 )  PTT: 34.7 sec    RADIOLOGY & ADDITIONAL TESTS:  < from: Xray Chest 1 View-PORTABLE IMMEDIATE (Xray Chest 1 View-PORTABLE IMMEDIATE .) (03.05.21 @ 09:49) >  Impression:    Interval placement of a left-sided chest tube, with improved appearance of the left lung.    < end of copied text >      MEDICATIONS:    chlorhexidine 4% Liquid 1 Application(s) Topical <User Schedule>  guaifenesin/dextromethorphan  Syrup 5 milliLiter(s) Oral every 6 hours PRN  ketorolac   Injectable 30 milliGRAM(s) IV Push once  melatonin 5 milliGRAM(s) Oral at bedtime  morphine  - Injectable 4 milliGRAM(s) IV Push every 4 hours PRN  naproxen 250 milliGRAM(s) Oral every 12 hours PRN  ondansetron    Tablet 4 milliGRAM(s) Oral every 6 hours PRN  pantoprazole    Tablet 40 milliGRAM(s) Oral before breakfast  senna 2 Tablet(s) Oral at bedtime  simethicone 80 milliGRAM(s) Chew every 6 hours  traMADol 25 milliGRAM(s) Oral every 6 hours PRN

## 2021-03-05 NOTE — PROGRESS NOTE ADULT - ASSESSMENT
Impression  Large exudative left pleural effusion s/p thora in ER (02/27) and repeat thora (02/28), s/p pleurx catheter placement (03/05)  HO Hodgkin lymphoma (s/p 1 dose ABVD)    Recommendations  s/p 1000cc drained during procedure, FU pleural studies  DW with CTS   HOB at 43  Aspiration precautions  DVT ppx  FU with heme onc Impression  Large exudative left pleural effusion s/p thora in ER (02/27) and repeat thora (02/28), s/p pleurx catheter placement (03/05)  HO Hodgkin lymphoma (s/p 1 dose ABVD)    Recommendations  s/p 1000cc drained during procedure, FU pleural studies  HOB at 43  Aspiration precautions  DVT ppx  FU with heme onc  Follow up PF analysis

## 2021-03-06 VITALS
TEMPERATURE: 97 F | DIASTOLIC BLOOD PRESSURE: 67 MMHG | HEART RATE: 97 BPM | RESPIRATION RATE: 18 BRPM | SYSTOLIC BLOOD PRESSURE: 101 MMHG

## 2021-03-06 LAB
ALBUMIN FLD-MCNC: 2.3 G/DL — SIGNIFICANT CHANGE UP
GLUCOSE FLD-MCNC: 110 MG/DL — SIGNIFICANT CHANGE UP
LDH SERPL L TO P-CCNC: 144 U/L — SIGNIFICANT CHANGE UP
PH FLD: 7.5 — SIGNIFICANT CHANGE UP
PROT FLD-MCNC: 3.9 G/DL — SIGNIFICANT CHANGE UP

## 2021-03-06 PROCEDURE — 88189 FLOWCYTOMETRY/READ 16 & >: CPT

## 2021-03-06 PROCEDURE — 99239 HOSP IP/OBS DSCHRG MGMT >30: CPT

## 2021-03-06 PROCEDURE — 99232 SBSQ HOSP IP/OBS MODERATE 35: CPT

## 2021-03-06 RX ORDER — KETOROLAC TROMETHAMINE 30 MG/ML
1 SYRINGE (ML) INJECTION
Qty: 20 | Refills: 0
Start: 2021-03-06 | End: 2021-03-10

## 2021-03-06 RX ORDER — KETOROLAC TROMETHAMINE 30 MG/ML
10 SYRINGE (ML) INJECTION ONCE
Refills: 0 | Status: DISCONTINUED | OUTPATIENT
Start: 2021-03-06 | End: 2021-03-06

## 2021-03-06 RX ORDER — KETOROLAC TROMETHAMINE 30 MG/ML
30 SYRINGE (ML) INJECTION ONCE
Refills: 0 | Status: DISCONTINUED | OUTPATIENT
Start: 2021-03-06 | End: 2021-03-06

## 2021-03-06 RX ADMIN — SIMETHICONE 80 MILLIGRAM(S): 80 TABLET, CHEWABLE ORAL at 11:23

## 2021-03-06 RX ADMIN — PANTOPRAZOLE SODIUM 40 MILLIGRAM(S): 20 TABLET, DELAYED RELEASE ORAL at 06:40

## 2021-03-06 RX ADMIN — Medication 30 MILLIGRAM(S): at 14:58

## 2021-03-06 RX ADMIN — Medication 30 MILLIGRAM(S): at 15:02

## 2021-03-06 RX ADMIN — TRAMADOL HYDROCHLORIDE 25 MILLIGRAM(S): 50 TABLET ORAL at 13:20

## 2021-03-06 RX ADMIN — TRAMADOL HYDROCHLORIDE 25 MILLIGRAM(S): 50 TABLET ORAL at 05:34

## 2021-03-06 RX ADMIN — TRAMADOL HYDROCHLORIDE 25 MILLIGRAM(S): 50 TABLET ORAL at 11:23

## 2021-03-06 RX ADMIN — SIMETHICONE 80 MILLIGRAM(S): 80 TABLET, CHEWABLE ORAL at 05:31

## 2021-03-06 NOTE — PROGRESS NOTE ADULT - SUBJECTIVE AND OBJECTIVE BOX
SALOMÓNCLARA  40y  Female  ***My note supersedes ALL resident notes that I sign.  My corrections for their notes are in my note.***    I can be reached directly on [id number]. My office number is [phone]. My personal cell number is [phone].    INTERVAL EVENTS: Here for f/u of lymphoma. Pt feels achy from cancer and pleurx. She likes the toradol. Tramadol is unhelpful and narcotics are too strong. Pt can eat/drink. Pt OK to go home. Pt was drained this AM via pleurX, 0.6 liter.    T(F): 97.4 (03-06-21 @ 04:57), Max: 98.2 (03-05-21 @ 13:11)  HR: 98 (03-06-21 @ 04:57) (88 - 99)  BP: 106/66 (03-06-21 @ 04:57) (94/60 - 106/66)  RR: 19 (03-06-21 @ 04:57) (19 - 19)  SpO2: 97% (03-06-21 @ 09:53) (97% - 97%)    Gen: NAD  HEENT: PERRL, EOMI, mouth clr, nose clr  Neck: + b/l cerv nodes, no JVD, thyroid nl  lungs: decr BS left base, improved sounds upper lt side; rt side clr; chest tube site is not bleeding  hrt: s1 s2 rrr no murmur  abd: soft, NT/ND, no HS megaly  ext: no edema, no c/c  neuro: aa ox3, cn intact, can move all 4 ext    LABS:                      10.0    (    79.2   9.11  )-----------( ---------      345      ( 04 Mar 2021 21:23 )             32.4    (    15.4     PT/INR - ( 04 Mar 2021 21:23 )   PT: 15.00 sec;   INR: 1.30 ratio    PTT - ( 04 Mar 2021 21:23 )  PTT: 34.7 sec    RADIOLOGY & ADDITIONAL TESTS:      MEDICATIONS:    chlorhexidine 4% Liquid 1 Application(s) Topical <User Schedule>  guaifenesin/dextromethorphan  Syrup 5 milliLiter(s) Oral every 6 hours PRN  melatonin 5 milliGRAM(s) Oral at bedtime  morphine  - Injectable 4 milliGRAM(s) IV Push every 4 hours PRN  naproxen 250 milliGRAM(s) Oral every 12 hours PRN  ondansetron    Tablet 4 milliGRAM(s) Oral every 6 hours PRN  pantoprazole    Tablet 40 milliGRAM(s) Oral before breakfast  senna 2 Tablet(s) Oral at bedtime  simethicone 80 milliGRAM(s) Chew every 6 hours  traMADol 25 milliGRAM(s) Oral every 6 hours PRN

## 2021-03-06 NOTE — PROGRESS NOTE ADULT - REASON FOR ADMISSION
Shortness of breath

## 2021-03-06 NOTE — PROGRESS NOTE ADULT - PROVIDER SPECIALTY LIST ADULT
Internal Medicine
Pulmonology
Pulmonology
Heme/Onc
Internal Medicine
Pulmonology
Internal Medicine
Internal Medicine
Heme/Onc
Internal Medicine
Pulmonology
Pulmonology

## 2021-03-06 NOTE — PROGRESS NOTE ADULT - ASSESSMENT
Impression  Large exudative left pleural effusion s/p thora in ER (02/27) and repeat thora (02/28), s/p pleurx catheter placement (03/05)  HO Hodgkin lymphoma (s/p 1 dose ABVD)    Recommendations  FU pleural analysis and Cytology.  600 cc fluid darined today.  PleurX dressing performed.   HOB at 43  Aspiration precautions  DVT ppx  FU with heme onc  Can DC home from pulm stand point.    Needs VNS services for PleurX drainage teaching  OP pulm follow up in 2 weeks

## 2021-03-06 NOTE — PROGRESS NOTE ADULT - SUBJECTIVE AND OBJECTIVE BOX
Patient is a 40y old  Female who presents with a chief complaint of Shortness of breath (05 Mar 2021 18:44)        SUBJECTIVE:  Feels better.  SP PleurX catheter yesterday.        REVIEW OF SYSTEMS:    All Pertinent ROS are negative except per HPI       PHYSICAL EXAM  Vital Signs Last 24 Hrs  T(C): 36.3 (06 Mar 2021 04:57), Max: 36.8 (05 Mar 2021 13:11)  T(F): 97.4 (06 Mar 2021 04:57), Max: 98.2 (05 Mar 2021 13:11)  HR: 98 (06 Mar 2021 04:57) (88 - 105)  BP: 106/66 (06 Mar 2021 04:57) (94/60 - 106/66)  BP(mean): --  RR: 19 (06 Mar 2021 04:57) (19 - 20)  SpO2: 98% (05 Mar 2021 10:21) (95% - 100%)    CONSTITUTIONAL:  Well nourished.  NAD    ENT:   Airway patent,   No thrush    CARDIAC:   Normal rate,   regular rhythm.    no edema      RESPIRATORY:   NO Wheezing  Nnormal chest expansion  Nnot tachypneic,  No use of accessory muscles    GASTROINTESTINAL:  Abdomen soft,   non-tender,   no guarding,   + BS    MUSCULOSKELETAL:   range of motion is not limited,  no clubbing, cyanosis    NEUROLOGICAL:   Alert and oriented   no motor or deficits.    SKIN:   Skin normal color for race,   warm, dry   No evidence of rash.        LABS:                          10.0   9.11  )-----------( 345      ( 04 Mar 2021 21:23 )             32.4                                               03-05    136  |  98  |  11  ----------------------------<  105<H>  4.5   |  28  |  0.7    Ca    8.9      05 Mar 2021 11:52  Mg     1.8     03-04    TPro  5.8<L>  /  Alb  3.1<L>  /  TBili  <0.2  /  DBili  x   /  AST  16  /  ALT  16  /  AlkPhos  85  03-04      PT/INR - ( 04 Mar 2021 21:23 )   PT: 15.00 sec;   INR: 1.30 ratio         PTT - ( 04 Mar 2021 21:23 )  PTT:34.7 sec                                                                                     LIVER FUNCTIONS - ( 04 Mar 2021 21:23 )  Alb: 3.1 g/dL / Pro: 5.8 g/dL / ALK PHOS: 85 U/L / ALT: 16 U/L / AST: 16 U/L / GGT: x                                                  Culture - Body Fluid with Gram Stain (collected 05 Mar 2021 08:55)  Source: .Body Fluid Pleural Fluid  Gram Stain (05 Mar 2021 20:44):    polymorphonuclear leukocytes seen    No organisms seen    by cytocentrifuge                                                    MEDICATIONS  (STANDING):  chlorhexidine 4% Liquid 1 Application(s) Topical <User Schedule>  melatonin 5 milliGRAM(s) Oral at bedtime  pantoprazole    Tablet 40 milliGRAM(s) Oral before breakfast  senna 2 Tablet(s) Oral at bedtime  simethicone 80 milliGRAM(s) Chew every 6 hours    MEDICATIONS  (PRN):  guaifenesin/dextromethorphan  Syrup 5 milliLiter(s) Oral every 6 hours PRN Cough  morphine  - Injectable 4 milliGRAM(s) IV Push every 4 hours PRN Severe Pain (7 - 10)  naproxen 250 milliGRAM(s) Oral every 12 hours PRN Mild Pain (1 - 3)  ondansetron    Tablet 4 milliGRAM(s) Oral every 6 hours PRN Nausea and/or Vomiting  traMADol 25 milliGRAM(s) Oral every 6 hours PRN Moderate Pain (4 - 6)      X-Rays reviewed    CXR interpreted by me:

## 2021-03-06 NOTE — PROGRESS NOTE ADULT - ASSESSMENT
40 year old woman with Hx of Hodgkin lymphoma (Active, S/p one dose of ABVD w/ anaphylactic rxn) p/w SOB, night sweats, fevers and L sided non radiating pleuritic chest pain. Found to have a large L pleural effusion.    # LABS ARE MOSTLY OK - CAN LIMIT LAB WORK     # Hodgkin Lymphoma w/ very extensive dz on PET scan in neck, mediastinum and thorax w/ tracheal dev to rt; very large (and recurrent) lt pl eff (cytology neg); sm rt pl eff and sm pericard eff  the pt's pleural effusions ARE related to her malignancy   cough is from cancer and pl effusions  s/p thoracentesis x2, which caused PTX, which is resolved on latest xray  Diagnosed 8/2020 -> still not adequately treated (s/p 1x treatment w/ ABVD w/ reported anaphylactic reaction)  h/o is planning on immunoTx as outpt -> previously spoke w/ Dr Childress: pt got ABVD w/ Dr Schultz at [facility] and had bad rxn; pt was offered a re-trial of the chemo as inpt, but pt declined; Dr Childress was called as 2nd opinion and is offering 2nd line tx w/ immunoTx (pembrolizumab); this has to be given as outpt, so we need to d/c pt to get there  pl eff on left is recurrent: pt's new, secondary insurance is covering collection canisters for a pleurX cath; so surgical pleurodesis on hold  pt is on room air (d/c all O2)  s/p pleurX cath placement  ambulation tolerance improving w/ pleurX drainage  robitussin DM for cough     # Neopl-related pain and acute pain from pleurX cath  can use toradol 15-30mg po q8 prn pain for 5 days, then can use Aleve 1-2 tab po q12 for pain  pt did NOT want tramadol (not helpful) or oral narcotics (too strong)    # microcytic Anemia  iron studies are not truly c/w THOMPSON   s/p Venofer  no need for further iron    # abd pain - non-specific -> I believe it is due to her malignancy  pt had H pylori a few mo ago  stool for HP Ag is NEG now  cont simethicone  cont PPI  zofran prn N/V    Dispo: f/u CM re PleurX canisters; tx pain; d/c home today   outpt h/o f/u for immunoTx: I texted Dr Childress today

## 2021-03-10 LAB
CULTURE RESULTS: SIGNIFICANT CHANGE UP
SPECIMEN SOURCE: SIGNIFICANT CHANGE UP

## 2021-03-11 PROBLEM — C81.90 HODGKIN LYMPHOMA, UNSPECIFIED, UNSPECIFIED SITE: Chronic | Status: ACTIVE | Noted: 2021-02-27

## 2021-03-12 ENCOUNTER — APPOINTMENT (OUTPATIENT)
Dept: PULMONOLOGY | Facility: CLINIC | Age: 41
End: 2021-03-12
Payer: MEDICAID

## 2021-03-12 ENCOUNTER — OUTPATIENT (OUTPATIENT)
Dept: OUTPATIENT SERVICES | Facility: HOSPITAL | Age: 41
LOS: 1 days | Discharge: HOME | End: 2021-03-12

## 2021-03-12 ENCOUNTER — OUTPATIENT (OUTPATIENT)
Dept: OUTPATIENT SERVICES | Facility: HOSPITAL | Age: 41
LOS: 1 days | Discharge: HOME | End: 2021-03-12
Payer: MEDICAID

## 2021-03-12 ENCOUNTER — RESULT REVIEW (OUTPATIENT)
Age: 41
End: 2021-03-12

## 2021-03-12 VITALS
HEIGHT: 61 IN | WEIGHT: 122 LBS | HEART RATE: 107 BPM | DIASTOLIC BLOOD PRESSURE: 69 MMHG | SYSTOLIC BLOOD PRESSURE: 91 MMHG | BODY MASS INDEX: 23.03 KG/M2 | TEMPERATURE: 98.3 F | OXYGEN SATURATION: 96 %

## 2021-03-12 DIAGNOSIS — J90 PLEURAL EFFUSION, NOT ELSEWHERE CLASSIFIED: ICD-10-CM

## 2021-03-12 PROCEDURE — 71046 X-RAY EXAM CHEST 2 VIEWS: CPT | Mod: 26

## 2021-03-12 PROCEDURE — 99203 OFFICE O/P NEW LOW 30 MIN: CPT | Mod: GC

## 2021-03-12 NOTE — REVIEW OF SYSTEMS
[Fever] : fever [Fatigue] : fatigue [Chills] : chills [Negative] : Endocrine [Recent Wt Loss (___ Lbs)] : ~T recent [unfilled] lb weight loss

## 2021-03-16 ENCOUNTER — NON-APPOINTMENT (OUTPATIENT)
Age: 41
End: 2021-03-16

## 2021-03-16 ENCOUNTER — INPATIENT (INPATIENT)
Facility: HOSPITAL | Age: 41
LOS: 1 days | Discharge: HOME HEALTH | End: 2021-03-18
Attending: INTERNAL MEDICINE | Admitting: INTERNAL MEDICINE
Payer: COMMERCIAL

## 2021-03-16 VITALS
OXYGEN SATURATION: 98 % | SYSTOLIC BLOOD PRESSURE: 114 MMHG | HEART RATE: 123 BPM | HEIGHT: 61 IN | DIASTOLIC BLOOD PRESSURE: 62 MMHG | TEMPERATURE: 98 F | RESPIRATION RATE: 20 BRPM

## 2021-03-16 DIAGNOSIS — J90 PLEURAL EFFUSION, NOT ELSEWHERE CLASSIFIED: ICD-10-CM

## 2021-03-16 DIAGNOSIS — R06.03 ACUTE RESPIRATORY DISTRESS: ICD-10-CM

## 2021-03-16 DIAGNOSIS — D50.9 IRON DEFICIENCY ANEMIA, UNSPECIFIED: ICD-10-CM

## 2021-03-16 DIAGNOSIS — C81.90 HODGKIN LYMPHOMA, UNSPECIFIED, UNSPECIFIED SITE: ICD-10-CM

## 2021-03-16 DIAGNOSIS — G89.3 NEOPLASM RELATED PAIN (ACUTE) (CHRONIC): ICD-10-CM

## 2021-03-16 DIAGNOSIS — J93.9 PNEUMOTHORAX, UNSPECIFIED: ICD-10-CM

## 2021-03-16 LAB
ALBUMIN SERPL ELPH-MCNC: 3.5 G/DL — SIGNIFICANT CHANGE UP (ref 3.5–5.2)
ALP SERPL-CCNC: 87 U/L — SIGNIFICANT CHANGE UP (ref 30–115)
ALT FLD-CCNC: 18 U/L — SIGNIFICANT CHANGE UP (ref 0–41)
ANION GAP SERPL CALC-SCNC: 11 MMOL/L — SIGNIFICANT CHANGE UP (ref 7–14)
APTT BLD: 34 SEC — SIGNIFICANT CHANGE UP (ref 27–39.2)
AST SERPL-CCNC: 18 U/L — SIGNIFICANT CHANGE UP (ref 0–41)
BASOPHILS # BLD AUTO: 0.02 K/UL — SIGNIFICANT CHANGE UP (ref 0–0.2)
BASOPHILS NFR BLD AUTO: 0.1 % — SIGNIFICANT CHANGE UP (ref 0–1)
BILIRUB SERPL-MCNC: <0.2 MG/DL — SIGNIFICANT CHANGE UP (ref 0.2–1.2)
BUN SERPL-MCNC: 12 MG/DL — SIGNIFICANT CHANGE UP (ref 10–20)
CALCIUM SERPL-MCNC: 9.2 MG/DL — SIGNIFICANT CHANGE UP (ref 8.5–10.1)
CHLORIDE SERPL-SCNC: 96 MMOL/L — LOW (ref 98–110)
CO2 SERPL-SCNC: 26 MMOL/L — SIGNIFICANT CHANGE UP (ref 17–32)
CREAT SERPL-MCNC: 0.6 MG/DL — LOW (ref 0.7–1.5)
EOSINOPHIL # BLD AUTO: 0.05 K/UL — SIGNIFICANT CHANGE UP (ref 0–0.7)
EOSINOPHIL NFR BLD AUTO: 0.4 % — SIGNIFICANT CHANGE UP (ref 0–8)
GLUCOSE SERPL-MCNC: 96 MG/DL — SIGNIFICANT CHANGE UP (ref 70–99)
HCG SERPL QL: NEGATIVE — SIGNIFICANT CHANGE UP
HCT VFR BLD CALC: 34.3 % — LOW (ref 37–47)
HGB BLD-MCNC: 10.7 G/DL — LOW (ref 12–16)
IMM GRANULOCYTES NFR BLD AUTO: 0.4 % — HIGH (ref 0.1–0.3)
INR BLD: 1.27 RATIO — SIGNIFICANT CHANGE UP (ref 0.65–1.3)
LACTATE SERPL-SCNC: 0.9 MMOL/L — SIGNIFICANT CHANGE UP (ref 0.7–2)
LIDOCAIN IGE QN: 13 U/L — SIGNIFICANT CHANGE UP (ref 7–60)
LYMPHOCYTES # BLD AUTO: 0.82 K/UL — LOW (ref 1.2–3.4)
LYMPHOCYTES # BLD AUTO: 5.9 % — LOW (ref 20.5–51.1)
MAGNESIUM SERPL-MCNC: 1.8 MG/DL — SIGNIFICANT CHANGE UP (ref 1.8–2.4)
MCHC RBC-ENTMCNC: 24.3 PG — LOW (ref 27–31)
MCHC RBC-ENTMCNC: 31.2 G/DL — LOW (ref 32–37)
MCV RBC AUTO: 77.8 FL — LOW (ref 81–99)
MONOCYTES # BLD AUTO: 0.69 K/UL — HIGH (ref 0.1–0.6)
MONOCYTES NFR BLD AUTO: 5 % — SIGNIFICANT CHANGE UP (ref 1.7–9.3)
NEUTROPHILS # BLD AUTO: 12.23 K/UL — HIGH (ref 1.4–6.5)
NEUTROPHILS NFR BLD AUTO: 88.2 % — HIGH (ref 42.2–75.2)
NRBC # BLD: 0 /100 WBCS — SIGNIFICANT CHANGE UP (ref 0–0)
NT-PROBNP SERPL-SCNC: 56 PG/ML — SIGNIFICANT CHANGE UP (ref 0–300)
PLATELET # BLD AUTO: 477 K/UL — HIGH (ref 130–400)
POTASSIUM SERPL-MCNC: 4.9 MMOL/L — SIGNIFICANT CHANGE UP (ref 3.5–5)
POTASSIUM SERPL-SCNC: 4.9 MMOL/L — SIGNIFICANT CHANGE UP (ref 3.5–5)
PROT SERPL-MCNC: 6.2 G/DL — SIGNIFICANT CHANGE UP (ref 6–8)
PROTHROM AB SERPL-ACNC: 14.6 SEC — HIGH (ref 9.95–12.87)
RAPID RVP RESULT: SIGNIFICANT CHANGE UP
RBC # BLD: 4.41 M/UL — SIGNIFICANT CHANGE UP (ref 4.2–5.4)
RBC # FLD: 16.3 % — HIGH (ref 11.5–14.5)
SARS-COV-2 RNA SPEC QL NAA+PROBE: SIGNIFICANT CHANGE UP
SODIUM SERPL-SCNC: 133 MMOL/L — LOW (ref 135–146)
TROPONIN T SERPL-MCNC: <0.01 NG/ML — SIGNIFICANT CHANGE UP
WBC # BLD: 13.87 K/UL — HIGH (ref 4.8–10.8)
WBC # FLD AUTO: 13.87 K/UL — HIGH (ref 4.8–10.8)

## 2021-03-16 PROCEDURE — 99285 EMERGENCY DEPT VISIT HI MDM: CPT

## 2021-03-16 PROCEDURE — 74177 CT ABD & PELVIS W/CONTRAST: CPT | Mod: 26

## 2021-03-16 PROCEDURE — 71045 X-RAY EXAM CHEST 1 VIEW: CPT | Mod: 26

## 2021-03-16 PROCEDURE — 93010 ELECTROCARDIOGRAM REPORT: CPT

## 2021-03-16 PROCEDURE — 71275 CT ANGIOGRAPHY CHEST: CPT | Mod: 26

## 2021-03-16 RX ORDER — MORPHINE SULFATE 50 MG/1
4 CAPSULE, EXTENDED RELEASE ORAL ONCE
Refills: 0 | Status: DISCONTINUED | OUTPATIENT
Start: 2021-03-16 | End: 2021-03-16

## 2021-03-16 RX ORDER — MORPHINE SULFATE 50 MG/1
4 CAPSULE, EXTENDED RELEASE ORAL EVERY 4 HOURS
Refills: 0 | Status: DISCONTINUED | OUTPATIENT
Start: 2021-03-16 | End: 2021-03-17

## 2021-03-16 RX ORDER — CEFEPIME 1 G/1
2000 INJECTION, POWDER, FOR SOLUTION INTRAMUSCULAR; INTRAVENOUS ONCE
Refills: 0 | Status: COMPLETED | OUTPATIENT
Start: 2021-03-16 | End: 2021-03-16

## 2021-03-16 RX ORDER — ONDANSETRON 8 MG/1
4 TABLET, FILM COATED ORAL EVERY 8 HOURS
Refills: 0 | Status: DISCONTINUED | OUTPATIENT
Start: 2021-03-16 | End: 2021-03-18

## 2021-03-16 RX ORDER — VANCOMYCIN HCL 1 G
1500 VIAL (EA) INTRAVENOUS ONCE
Refills: 0 | Status: COMPLETED | OUTPATIENT
Start: 2021-03-16 | End: 2021-03-16

## 2021-03-16 RX ORDER — ONDANSETRON 8 MG/1
4 TABLET, FILM COATED ORAL ONCE
Refills: 0 | Status: COMPLETED | OUTPATIENT
Start: 2021-03-16 | End: 2021-03-16

## 2021-03-16 RX ADMIN — MORPHINE SULFATE 4 MILLIGRAM(S): 50 CAPSULE, EXTENDED RELEASE ORAL at 15:51

## 2021-03-16 RX ADMIN — CEFEPIME 2000 MILLIGRAM(S): 1 INJECTION, POWDER, FOR SOLUTION INTRAMUSCULAR; INTRAVENOUS at 19:16

## 2021-03-16 RX ADMIN — Medication 300 MILLIGRAM(S): at 21:54

## 2021-03-16 RX ADMIN — ONDANSETRON 4 MILLIGRAM(S): 8 TABLET, FILM COATED ORAL at 15:52

## 2021-03-16 RX ADMIN — CEFEPIME 100 MILLIGRAM(S): 1 INJECTION, POWDER, FOR SOLUTION INTRAMUSCULAR; INTRAVENOUS at 18:05

## 2021-03-16 NOTE — ED ADULT NURSE NOTE - NSIMPLEMENTINTERV_GEN_ALL_ED
Implemented All Fall with Harm Risk Interventions:  [place] to call system. Call bell, personal items and telephone within reach. Instruct patient to call for assistance. Room bathroom lighting operational. Non-slip footwear when patient is off stretcher. Physically safe environment: no spills, clutter or unnecessary equipment. Stretcher in lowest position, wheels locked, appropriate side rails in place. Provide visual cue, wrist band, yellow gown, etc. Monitor gait and stability. Monitor for mental status changes and reorient to person, place, and time. Review medications for side effects contributing to fall risk. Reinforce activity limits and safety measures with patient and family. Provide visual clues: red socks.

## 2021-03-16 NOTE — H&P ADULT - NSHPREVIEWOFSYSTEMS_GEN_ALL_CORE
CONSTITUTIONAL: No weakness, fevers or chills  EYES/ENT: No visual changes;  No vertigo or throat pain   NECK: No pain or stiffness  RESPIRATORY: No cough, wheezing, hemoptysis; No shortness of breath  CARDIOVASCULAR: No chest pain or palpitations  GASTROINTESTINAL: No abdominal or epigastric pain. No nausea, vomiting, or hematemesis; No diarrhea or constipation. No melena or hematochezia.  GENITOURINARY: No dysuria, frequency or hematuria  NEUROLOGICAL: No numbness or weakness  SKIN: No itching, rashes CONSTITUTIONAL: Fever and chills   EYES/ENT: No visual changes;  No vertigo or throat pain   NECK: No pain or stiffness  RESPIRATORY: No cough, wheezing, hemoptysis; No shortness of breath  CARDIOVASCULAR: chest pain worsened by movement  GASTROINTESTINAL: upper abdominal pain with nausea, no vomiting, or hematemesis; No diarrhea or constipation. No melena or hematochezia.  GENITOURINARY: No dysuria, frequency or hematuria  NEUROLOGICAL: No numbness or weakness  SKIN: No itching, rashes

## 2021-03-16 NOTE — H&P ADULT - NSHPLABSRESULTS_GEN_ALL_CORE
10.7   13.87 )-----------( 477      ( 16 Mar 2021 15:59 )             34.3       03-16    133<L>  |  96<L>  |  12  ----------------------------<  96  4.9   |  26  |  0.6<L>    Ca    9.2      16 Mar 2021 15:59  Mg     1.8     03-16    TPro  6.2  /  Alb  3.5  /  TBili  <0.2  /  DBili  x   /  AST  18  /  ALT  18  /  AlkPhos  87  03-16                  PT/INR - ( 16 Mar 2021 15:59 )   PT: 14.60 sec;   INR: 1.27 ratio         PTT - ( 16 Mar 2021 15:59 )  PTT:34.0 sec    Lactate Trend  03-16 @ 15:59 Lactate:0.9       CARDIAC MARKERS ( 16 Mar 2021 15:59 )  x     / <0.01 ng/mL / x     / x     / x            CAPILLARY BLOOD GLUCOSE 10.7   13.87 )-----------( 477      ( 16 Mar 2021 15:59 )             34.3       03-16    133<L>  |  96<L>  |  12  ----------------------------<  96  4.9   |  26  |  0.6<L>    Ca    9.2      16 Mar 2021 15:59  Mg     1.8     03-16    TPro  6.2  /  Alb  3.5  /  TBili  <0.2  /  DBili  x   /  AST  18  /  ALT  18  /  AlkPhos  87  03-16                  PT/INR - ( 16 Mar 2021 15:59 )   PT: 14.60 sec;   INR: 1.27 ratio         PTT - ( 16 Mar 2021 15:59 )  PTT:34.0 sec    Lactate Trend  03-16 @ 15:59 Lactate:0.9       CARDIAC MARKERS ( 16 Mar 2021 15:59 )  x     / <0.01 ng/mL / x     / x     / x            CAPILLARY BLOOD GLUCOSE      CT chest  3/17: Interval placement of a left pleural catheter with significant decrease in the left pleural effusion. Small residual hydropneumothorax is present with areas of loculation. Small-to-moderate size right pleural effusion without significant change.    Large anterior mediastinal mass extending into the supraclavicular space encasing the arch vessels and interspersed structures. Bilateral hilar, supraclavicular and axillary bulky lymphadenopathy. Findings consistent with known history of lymphoma.    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 Additionally, increased aeration left lung revealing innumerable left lung nodules. These can be infectious or metastatic in etiology.    Interlobular septal thickening, peribronchial and perivascular cuffing on the left likely secondary to lymphatic congestion from hilar lymphadenopathy.    Moderate pericardial effusion, increased since prior exam

## 2021-03-16 NOTE — ED PROVIDER NOTE - CLINICAL SUMMARY MEDICAL DECISION MAKING FREE TEXT BOX
pt w loculated pl effusion and pericardial effusion, no pe. admit for further work up/treatment, hemeonc eval. suspect needs to start treatment

## 2021-03-16 NOTE — H&P ADULT - ATTENDING COMMENTS
HPI:  39 y/o woman with a past medical history of advanced Hodgkin's lymphoma (s/p 1 round of ABVD, stooped due to anaphylaxis), malignant L sided pleural effusion s/p Pleurx cath with daily draining, Hx of pneumothorax secondary to thoracentesis, and microcytic anemia admitted for syncope. She was referred to come in by Pulmonary team after reporting a syncopal event on 3/14, while having her pleurex drained. She was being drained by her home nurse, ~100ml taken (usually gets about 500ml removed) and she began feeling flushed, dizzy, slight palpitations, and started seeing black spots. She told the nurse she didn't feels well, he stopped draining, took her vitals (reported they were fine) and then lost consciousness shortly afterwards in her chair. It is unclear how long she was unconscious for but no incontinence or shaking were reported to her, though she does think the may have bit her cheek. She was not SOB or experiencing chest pain prior to the event. Her home nurse never referred her to the ED for further work up following the syncopal event.   Additionally, she reports feeling feverish and hot over the last several days, noting a 101 fever on Monday 3/15. She states that her night sweats had improved. She does note some irritation at the Pleurex catheter site but no puss, drainage, or erythema from it. She has noted increased productive cough (white sputum), nausea, 2 episodes of vomiting, puritis, and chronic back pain. Today she began having pleuritic chest pain and some shortness of breath. She denied dysuria, new cuts, scrapes, rashes, sick contacts.     in the ED she was noted to be Tachycardic with a leukocytosis. CT chest revealed some loculated effusions, with extensive hodgkins lymphoma. She was given Vanco and Cefepime.     REVIEW OF SYSTEMS:  CONSTITUTIONAL:  No weakness, + fevers, chills, night sweats, weight loss  EYES/ENT: No visual changes. No vertigo or dysphagia  NECK: No neck pain or stiffness  RESPIRATORY: +cough, no wheezing, hemoptysis. + shortness of breath  CARDIOVASCULAR: + chest pain and palpitations. No lower extremity edema  GASTROINTESTINAL: No abdominal pain. + nausea, vomiting, no diarrhea, or hematemesis  GENITOURINARY: No dysuria or hematuria   NEUROLOGICAL: No focal numbness or weakness  SKIN: No rashes or itching  HEMATOLOGIC: No easy bruising or prolonged bleeding.      PHYSICAL EXAM:  GENERAL: NAD, well-developed, Non-toxic, stated age   HEAD:  Atraumatic, Normocephalic  EYES: EOMI, Sclera White   NECK: Supple, No JVD  CHEST/LUNG: crackles noted at the Left lower lung base; No wheezing, rhonchi, or crackles Pleurex catheter intact at L chest wall, no purulent drainage, blood, or erythema. No fluctuance or edema around the site. Slightly tender to touch.  HEART: Regular rate and rhythm; s1, s2, No murmurs, rubs, or gallops  ABDOMEN: Soft, Nontender, Nondistended; Bowel sounds present, No rebound or guarding noted   EXTREMITIES:  No lower extremity edema or calf tenderness to palpation.  No clubbing or cyanosis  PSYCH: AAOx3, pleasant, cooperative, not anxious  NEUROLOGY: non-focal  SKIN: No rashes or lesions. Though itching her skin       ASSESSMENT AND PLAN:  Syncope: possible vasovegal event but r/o cardiac causes. Transfer to telemetry for observation, check 2d Echo, repeat EKG, trend trops. Check orthostatics.     Pericardial Effusion: check 2D Echo, no evidence of heart dysfunction on CT. May benefit from lasix if orthostatic negative. Low voltage on EKG noted, no clear signs of pericarditis. Pulm follow up     SIRS (Fever, Leukocytosis, Tachycardia): unclear source of an infection, possible B symptoms? ID eval, s/p vanco and cefepime in ED. Follow up blood, urine cultures. Check Procal and pleural fluid culture, gram stain, glucose, and protein. Observe off antibiotics for now.     Advanced Hodgkin's Lymphoma: Heme-Onc following with plans to start immuno therapy on Fri. 3/19.     Anxiety: Continue home medications, it may also be driving some of her tachycardia.     Chronic Back pain: continue with toradol and tylenol. Morphine for severe pain.    Pruritis: Benadryl PRN.     DVT ppx: Lovenox/Heparin  GI ppx: Protonix while taking toradol.   GOC: Full code.      My note supersedes the residents in the event of a discrepancy.

## 2021-03-16 NOTE — ED PROVIDER NOTE - NS ED ROS FT
General: subj fever/chills.   Eyes:  No visual changes, eye pain or discharge.  ENMT:  No hearing changes, pain, no sore throat or runny nose, no difficulty swallowing  Cardiac: pleuritic cp, sob.   Respiratory: cough, dyspnea.   GI: abd pain, nausea. no vomiting, diarrhea.   :  No dysuria, frequency or burning.  MS:  No myalgia, muscle weakness, joint pain or back pain.  Neuro:  No headache.  No LOC. No change in ambulation. No dizziness.  Skin:  No skin rash.

## 2021-03-16 NOTE — H&P ADULT - NSHPPHYSICALEXAM_GEN_ALL_CORE
GENERAL: NAD, speaks in full sentences, no signs of respiratory distress  HEAD:  Atraumatic, Normocephalic  EYES: EOMI, PERRLA, conjunctiva and sclera clear  NECK: Supple, No JVD  CHEST/LUNG: Clear to auscultation bilaterally; No wheeze; No crackles; No accessory muscles used  HEART: Regular rate and rhythm; No murmurs;   ABDOMEN: Soft, Nontender, Nondistended; Bowel sounds present; No guarding  EXTREMITIES:  2+ Peripheral Pulses, No cyanosis or edema  PSYCH: AAOx3  NEUROLOGY: non-focal  SKIN: No rashes or lesions GENERAL: NAD, speaks in full sentences, no signs of respiratory distress  HEAD:  Atraumatic, Normocephalic  EYES: EOMI, PERRLA, conjunctiva and sclera clear  NECK: Supple, No JVD  CHEST/LUNG: crackles presents bilaterally, decreased breath sounds b/l,  No wheeze;  No accessory muscles used  HEART: Regular rate and rhythm; No murmurs  ABDOMEN: tender to palpation in the RUQ + LUQ, Nondistended; Bowel sounds present; No guarding  EXTREMITIES:  2+ Peripheral Pulses, No cyanosis or edema  PSYCH: AAOx3  NEUROLOGY: non-focal  SKIN: No rashes or lesions

## 2021-03-16 NOTE — H&P ADULT - ASSESSMENT
40 year old woman with Hx of Hodgkin lymphoma (Active, S/p one dose of ABVD w/ anaphylactic rxn) p/w SOB, night sweats, fevers and L sided non radiating pleuritic chest pain. Found to have a large L pleural effusion.      # Hodgkin Lymphoma w/ very extensive dz on PET scan in neck, mediastinum and thorax w/ tracheal dev to rt; very large (and recurrent) lt pl eff (cytology neg); sm rt pl eff and sm pericard eff  the pt's pleural effusions ARE related to her malignancy   Diagnosed 8/2020 -> still not adequately treated (s/p 1x treatment w/ ABVD w/ reported anaphylactic reaction)  s/p pleurX cath placement      # Neopl-related pain and acute pain from pleurX cath  can use toradol 15-30mg po q8 prn pain for 5 days, then can use Aleve 1-2 tab po q12 for pain                 40 year old woman with Hx of Hodgkin lymphoma (Active, s/p one dose of ABVD w/ anaphylactic rxn) p/w SOB, night sweats, fevers and L sided non radiating pleuritic chest pain. Found to have a large L pleural effusion.      # Hodgkin Lymphoma w/ very extensive dz on PET scan in neck, mediastinum and thorax w/ tracheal dev to rt; very large (and recurrent) lt pl eff (cytology neg); sm rt pl eff and sm pericard eff  the pt's pleural effusions ARE related to her malignancy   Diagnosed 8/2020 -> still not adequately treated (s/p 1x treatment w/ ABVD w/ reported anaphylactic reaction)        #Malignant Pleural Effusion  s/p pleurX cath placement    CT : Interval placement of a left pleural catheter with significant decrease in the left pleural effusion. Small residual hydropneumothorax is present with areas of loculation. Small-to-moderate size right pleural effusion without significant change.    Large anterior mediastinal mass extending into the supraclavicular space encasing the arch vessels and interspersed structures. Bilateral hilar, supraclavicular and axillary bulky lymphadenopathy. Findings consistent with known history of lymphoma.    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 Additionally, increased aeration left lung revealing innumerable left lung nodules. These can be infectious or metastatic in etiology.    Interlobular septal thickening, peribronchial and perivascular cuffing on the left likely secondary to lymphatic congestion from hilar lymphadenopathy.    Moderate pericardial effusion, increased since prior exam.      #Complicated PNA  -Leukocytosis   -Afebrile   -Lactate 0.9   -s/p Cefepime + Vancomycin      # Neoplasm related pain and acute pain from pleurX cath  can use Toradol po q8 prn pain for 5 days        Diet  GI PPX  DVT PPX  Dispo 40 year old woman with Hx of Hodgkin lymphoma (Active, s/p one dose of ABVD w/ anaphylactic rxn) p/w SOB, night sweats, fevers and L sided non radiating pleuritic chest pain. Found to have a large L pleural effusion.      # Hodgkin Lymphoma  Diagnosed 8/2020 -> still not adequately treated (s/p 1x treatment w/ ABVD w/ reported anaphylactic reaction)  Dr. Childress on board- pt initially scheduled for immunotherapy on 3/19      #Malignant Pleural Effusion  Recurrent left pleural effusion (cytology neg); small-moderate sized right pleural effusion   Moderate pericardial effusion appears slightly increased  s/p pleurX cath placement on 3/5    CT : Interval placement of a left pleural catheter with significant decrease in the left pleural effusion. Small residual hydropneumothorax is present with areas of loculation. Small-to-moderate size right pleural effusion without significant change.    Large anterior mediastinal mass extending into the supraclavicular space encasing the arch vessels and interspersed structures. Bilateral hilar, supraclavicular and axillary bulky lymphadenopathy. Findings consistent with known history of lymphoma.    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 Additionally, increased aeration left lung revealing innumerable left lung nodules. These can be infectious or metastatic in etiology.    Interlobular septal thickening, peribronchial and perivascular cuffing on the left likely secondary to lymphatic congestion from hilar lymphadenopathy.    Moderate pericardial effusion, increased since prior exam.      #Complicated PNA  -Leukocytosis   -Afebrile   -Lactate 0.9   -s/p Cefepime  -cont Vancomycin  -f/u BCX, UCx      # Neoplasm related pain and acute pain from pleurX cath  can use Toradol po q8 prn pain for 5 days      Diet  GI PPX  DVT PPX  Dispo 40 year old woman with Hx of Hodgkin lymphoma (Active, s/p one dose of ABVD w/ anaphylactic rxn) p/w SOB, night sweats, fevers and L sided non radiating pleuritic chest pain. Found to have a large L pleural effusion.      #Syncope   -f/u CT head   -Neuro consult if needed    # Hodgkin Lymphoma  -Diagnosed 8/2020 -> still not adequately treated (s/p 1x treatment w/ ABVD w/ reported anaphylactic reaction)  -[facility] Onc - Dr. Childress on board- pt scheduled for immunotherapy on 3/19 outpt    #Malignant Pleural Effusion  Recurrent left pleural effusion (cytology neg); small-moderate sized right pleural effusion   Moderate pericardial effusion appears slightly increased  s/p pleurX cath placement on 3/5, drained daily  f/u pulm consult, pt f/u Dr. Fitch as an outpatient     #Pleuritic chest pain   EKG- NSR  CT chest reveals Moderate pericardial effusion, increased since prior exam      #Leukocytosis likely secondary to complicated PNA  Small residual hydropneumothorax is present with areas of loculations and multiple lung nodules   -febrile 101 @ home   -Lactate 0.9   -s/p Cefepime  -cont Vancomycin  -Monitor WBC, 13.87 on admision  -f/u BCX  -f/u ID recs       #pain @ pleurX catheter site  #Back pain   # UQ Abdominal pain   -pain mgmt       Diet- Regular   GI PPX - Protonix  DVT PPX- Heparin SubQ  Dispo - Home when medically cleared 40 year old woman with Hx of Hodgkin lymphoma (Active, s/p one dose of ABVD w/ anaphylactic rxn) p/w SOB, night sweats, fevers and L sided non radiating pleuritic chest pain. Found to have a large L pleural effusion.      #Syncope one episode on 3/14  -Neuro consult if needed      #Advanced  Hodgkin Lymphoma  -Diagnosed 8/2020 -> still not adequately treated (s/p 1x treatment w/ ABVD w/ reported anaphylactic reaction)  -Heme Onc - Dr. Childress on board- pt scheduled for immunotherapy on 3/19 outpt      #Malignant Pleural Effusion  Recurrent left pleural effusion (cytology neg); small-moderate sized right pleural effusion   Moderate pericardial effusion appears slightly increased  s/p pleurX cath placement on 3/5, drained daily  f/u pulm consult, pt f/u Dr. Fitch as an outpatient       #Pleuritic chest pain   EKG- NSR  CT chest reveals Moderate pericardial effusion, increased since prior exam  Trop <0.01,f/u next set of trops       #Leukocytosis likely secondary to complicated PNA  Small residual hydropneumothorax is present with areas of loculations and multiple lung nodules   -febrile 101 @ home   -Lactate 0.9   -s/p Cefepime  -cont Vancomycin  -Monitor WBC, 13.87 on admission  -f/u BCX  -f/u ID recs       #pain @ pleurX catheter site  #Back pain   # UQ Abdominal pain   -pain mgmt     #Nausea  Zofran PRN       Diet- Regular   GI PPX - Protonix  DVT PPX- Heparin SubQ  Dispo - Home when medically cleared 40 year old F patient with Hx of Hodgkin lymphoma ( Active, s/p one dose of APVD ) was sent to the ED by her Pulmonologist Dr Fitch after pt stated she fainted on Jorge 3/14. Pt did not go to the ED then after this episode. She stated she felt dizzy and fainted but she does not recall how long she was out for. She also recalls vomitting after. Patient also endorses a non radiating pleuritic chest pain on the right sternal border that increases with cough and deep breaths. Pt has also experienced generalized back pain and spinal tenderness to palpation. She also had a temp of 101 on 3/15 and chills and has been coughing a lot more in the last few days.      #Syncope one episode on 3/14  -Neuro consult if needed      #Advanced  Hodgkin Lymphoma  -Diagnosed 8/2020 -> still not adequately treated (s/p 1x treatment w/ ABVD w/ reported anaphylactic reaction)  -Heme Onc - Dr. Childress on board- pt scheduled for immunotherapy on 3/19 outpt      #Malignant Pleural Effusion  Recurrent left pleural effusion (cytology neg); small-moderate sized right pleural effusion   Moderate pericardial effusion appears slightly increased  s/p pleurX cath placement on 3/5, drained daily  f/u pulm consult, pt f/u Dr. Fitch as an outpatient       #Pleuritic chest pain   EKG- NSR  CT chest reveals Moderate pericardial effusion, increased since prior exam  Trop <0.01, f/u next set of trops       #Leukocytosis likely secondary to complicated PNA  Small residual hydropneumothorax is present with areas of loculations and multiple lung nodules   -febrile 101 @ home   -Lactate 0.9   -s/p Cefepime  -cont Vancomycin  -Monitor WBC, 13.87 on admission  -f/u BCX  -f/u ID recs       #pain @ pleurX catheter site  #Back pain   # UQ Abdominal pain   -pain mgmt     #Nausea  Zofran PRN       Diet- Regular   GI PPX - Protonix  DVT PPX- Heparin SubQ  Dispo - Home when medically cleared 40 year old F patient with Hx of Hodgkin lymphoma ( Active, s/p one dose of APVD ) was sent to the ED by her Pulmonologist Dr Fitch after pt stated she fainted on Jorge 3/14. Pt did not go to the ED then after this episode. She stated she felt dizzy and fainted but she does not recall how long she was out for. She also recalls vomitting after. Patient also endorses a non radiating pleuritic chest pain on the right sternal border that increases with cough and deep breaths. Pt has also experienced generalized back pain and spinal tenderness to palpation. She also had a temp of 101 on 3/15 and chills and has been coughing a lot more in the last few days.    #Advanced  Hodgkin Lymphoma  -Diagnosed 8/2020 -> still not adequately treated (s/p 1x treatment w/ ABVD w/ reported anaphylactic reaction)  -Heme Onc - Dr. Childress on board- pt scheduled for immunotherapy on 3/19 outpt      #Malignant Pleural Effusion  Recurrent left pleural effusion (cytology neg); small-moderate sized right pleural effusion   Moderate pericardial effusion appears slightly increased  s/p pleurX cath placement on 3/5, drained daily  f/u pulm consult, pt f/u Dr. Fitch as an outpatient       #Pleuritic chest pain   EKG- NSR  CT chest reveals Moderate pericardial effusion, increased since prior exam  Trop <0.01, f/u next set of trops     #Syncope, one episode on 3/14  -Neuro consult if needed    #Leukocytosis likely secondary to complicated PNA  Small residual hydropneumothorax is present with areas of loculations and multiple lung nodules   -febrile 101 @ home   -Lactate 0.9   -s/p Cefepime  -cont Vancomycin  -Monitor WBC, 13.87 on admission  -f/u BCX  -f/u ID recs       #pain @ pleurX catheter site  #Back pain   # UQ Abdominal pain   -pain mgmt     #Nausea  Zofran PRN       Diet- Regular   GI PPX - Protonix  DVT PPX- Heparin SubQ  Dispo - Home when medically cleared  FULL CODE

## 2021-03-16 NOTE — ED PROVIDER NOTE - ATTENDING CONTRIBUTION TO CARE
40F PMH hodkins lymphoma not on treatment, s/p L pleurix catheter for malignant effusion, p/w multiple complaints. states sunday she had episode of syncope when her RN was changing her pleurix catheter. states also vomited yesterday nbnb and had fever 101 yesterday. c/o pleuritic cp and SOB, body aches, chills. no cough. c/o epigastric pain sharp constant nonradiating. no d/c. no dysuria, freq, hematuria.     on exam, AFVSS, well nasim nad, ncat, eomi, perrla, mmm, lctab, L catheter c/d/i, rrr nl s1s2 no mrg, abd soft mild epigastric ttp, nd, aaox3, no focal deficits, no le edema or calf ttp, tachy to 120s       a/p; active cancer, untreated, CP/sob, fever, syncope. abd pain, n/v. will do labs ekg/trop, bnp, CXR, CTA r/o pe, ct a/p, ivf pain control antiemetics, needs admission

## 2021-03-16 NOTE — H&P ADULT - HISTORY OF PRESENT ILLNESS
40 year old F patient with Hx of Hodgkin lymphoma ( Active, s/p one dose of APVD ) presented to the ED for shortness of breath. Patient also complained of left sided non radiating pleuritic chest pain that increases with cough and deep breaths. Patient denied any exertional chest pain, palpitation, dizziness or any other symptoms.     diagnosed with Hodgkin lymphoma back in Aug 2020 when she had similar presentation, at that time chest xray showed pleural effusion that was relieved by a thora, patient also had a biopsy of the lymph nodes and a PET scan which are found in the chart. Patient used to follow up with Dr. Schultz but after receiving the first dose of the APVD she developed a "Bad allergic reaction" and switched completely to holistic medicine and has been off any chemotherapy since then.       Vital Signs Last 24 Hrs  T(C): 36.8 (16 Mar 2021 16:04), Max: 36.9 (16 Mar 2021 13:20)  T(F): 98.2 (16 Mar 2021 16:04), Max: 98.4 (16 Mar 2021 13:20)  HR: 121 (16 Mar 2021 16:04) (121 - 123)  BP: 102/66 (16 Mar 2021 16:04) (102/66 - 114/62)  BP(mean): --  RR: 18 (16 Mar 2021 16:04) (18 - 20)  SpO2: 97% (16 Mar 2021 16:04) (97% - 98%)   40 year old F patient with Hx of Hodgkin lymphoma ( Active, s/p one dose of APVD ) presented to the ED for shortness of breath. Patient also complained of left sided non radiating pleuritic chest pain that increases with cough and deep breaths. Patient denied any exertional chest pain, palpitation, dizziness or any other symptoms.     Pt diagnosed with Hodgkin lymphoma back in Aug 2020 when she had similar presentation. Patient used to follow up with Dr. Schultz but after receiving the first dose of the APVD she developed a "Bad allergic reaction" and switched completely to holistic medicine and has been off any chemotherapy since then.     Pt was recently discharged 2 weeks ago. S/P pleurix catheter placed on 3/5 s/p drainage of 1000 L. plan was made to follow up with Dr. Childress to start on immunologics as patient does not want APVD.      Vital Signs Last 24 Hrs  T(C): 36.8 (16 Mar 2021 16:04), Max: 36.9 (16 Mar 2021 13:20)  T(F): 98.2 (16 Mar 2021 16:04), Max: 98.4 (16 Mar 2021 13:20)  HR: 121 (16 Mar 2021 16:04) (121 - 123)  BP: 102/66 (16 Mar 2021 16:04) (102/66 - 114/62)  BP(mean): --  RR: 18 (16 Mar 2021 16:04) (18 - 20)  SpO2: 97% (16 Mar 2021 16:04) (97% - 98%)   40 year old F patient with Hx of Hodgkin lymphoma ( Active, s/p one dose of APVD ) presented to the ED for shortness of breath. Patient also complained of left sided non radiating pleuritic chest pain that increases with cough and deep breaths. Patient denied any exertional chest pain, palpitation, dizziness or any other symptoms.     Pt diagnosed with Hodgkin lymphoma back in Aug 2020 when she had similar presentation. Patient used to follow up with Dr. Schultz but after receiving the first dose of the APVD she developed a "Bad allergic reaction" and switched completely to holistic medicine and has been off any chemotherapy since then.     Pt was recently discharged 2 weeks ago. S/P pleurix catheter placed on 3/5/21 s/p drainage of 1000 L. plan was made to follow up with Dr. Childress to start on immunologics as patient does not want APVD.      Vital Signs Last 24 Hrs  T(C): 36.8 (16 Mar 2021 16:04), Max: 36.9 (16 Mar 2021 13:20)  T(F): 98.2 (16 Mar 2021 16:04), Max: 98.4 (16 Mar 2021 13:20)  HR: 121 (16 Mar 2021 16:04) (121 - 123)  BP: 102/66 (16 Mar 2021 16:04) (102/66 - 114/62)  BP(mean): --  RR: 18 (16 Mar 2021 16:04) (18 - 20)  SpO2: 97% (16 Mar 2021 16:04) (97% - 98%)   40 year old F patient with Hx of Hodgkin lymphoma ( Active, s/p one dose of APVD ) was sent to the ED by her Pulmonologist Dr Fitch after pt stated she fainted on [place] 3/14. Pt did not go to the ED then after this episode. She stated she felt dizzy and fainted but she does not recall how long she was out for. She also recalls vomitting after. Patient also endorses a non radiating pleuritic chest pain on the right sternal border that increases with cough and deep breaths. Pt has also experienced generalized back pain and spinal tenderness to palpation. She also had a temp of 101 on 3/15 and chills and has been coughing a lot more in the last few days. Patient denied any exertional chest pain, palpitation, dizziness or any other symptoms.     Pt diagnosed with Hodgkin lymphoma back in Aug 2020 when she had similar presentation. Patient used to follow up with Dr. Schultz but after receiving the first dose of the APVD she developed a "Bad allergic reaction" and switched completely to holistic medicine and has been off any chemotherapy since then.     Pt was recently discharged 2 weeks ago. S/P pleurix catheter placed on 3/5/21. Plan was made to follow up with Dr. Childress to start on immunologics as patient does not want APVD.      EKG- NSR    ED lab:  WBC of 13.87, Lactate 0.9, Trop <0.01    Vital Signs Last 24 Hrs  T(C): 36.8 (16 Mar 2021 16:04), Max: 36.9 (16 Mar 2021 13:20)  T(F): 98.2 (16 Mar 2021 16:04), Max: 98.4 (16 Mar 2021 13:20)  HR: 121 (16 Mar 2021 16:04) (121 - 123)  BP: 102/66 (16 Mar 2021 16:04) (102/66 - 114/62)  BP(mean): --  RR: 18 (16 Mar 2021 16:04) (18 - 20)  SpO2: 97% (16 Mar 2021 16:04) (97% - 98%)   40 year old F patient with Hx of Hodgkin lymphoma ( Active, s/p one dose of ABVD ) was sent to the ED by her Pulmonologist Dr Fitch after pt stated she fainted on Jorge 3/14. Pt did not go to the ED then after this episode. She stated she felt dizzy and fainted but she does not recall how long she was out for. She also recalls vomitting after. Patient also endorses a non radiating pleuritic chest pain on the right sternal border that increases with cough and deep breaths. Pt has also experienced generalized back pain and spinal tenderness to palpation. She also had a temp of 101 on 3/15 and chills and has been coughing a lot more in the last few days. Patient denied any exertional chest pain, palpitation, dizziness or any other symptoms.     Pt diagnosed with Hodgkin lymphoma back in Aug 2020 when she had similar presentation. Patient used to follow up with Dr. Schultz but after receiving the first dose of the ABVD she developed a "Bad allergic reaction" and switched completely to holistic medicine and has been off any chemotherapy since then.     Pt was recently discharged 2 weeks ago. S/P pleurix catheter placed on 3/5/21. Plan was made to follow up with Dr. Childress to start on immunologics as patient does not want ABVD.      EKG- NSR    ED lab:  WBC of 13.87, Lactate 0.9, Trop <0.01    Vital Signs Last 24 Hrs  T(C): 36.8 (16 Mar 2021 16:04), Max: 36.9 (16 Mar 2021 13:20)  T(F): 98.2 (16 Mar 2021 16:04), Max: 98.4 (16 Mar 2021 13:20)  HR: 121 (16 Mar 2021 16:04) (121 - 123)  BP: 102/66 (16 Mar 2021 16:04) (102/66 - 114/62)  BP(mean): --  RR: 18 (16 Mar 2021 16:04) (18 - 20)  SpO2: 97% (16 Mar 2021 16:04) (97% - 98%)

## 2021-03-16 NOTE — CONSULT NOTE ADULT - ASSESSMENT
ADVANCED STAGE HODGKINS   untreated and progressing immunotherapy  is planned for out pt but now again in hospital   pt to be seen by medical and pulmonary to [facility]  in hospital plan   we will follow     zach cortés MD   [phone]

## 2021-03-16 NOTE — ED PROVIDER NOTE - PHYSICAL EXAMINATION
Constitutional: Well developed, well nourished. NAD. Good general hygiene  Head: Atraumatic.  Eyes: PERRLA. EOMI without discomfort.   ENT: No nasal discharge. Mucous membranes moist.  Neck: Supple. Painless ROM.  Cardiovascular: sinus tachycardia. Normal S1 and S2. No murmurs. 2+ pulses in all extremities.   Pulmonary: RR 24. Lungs clear to auscultation bilaterally. No wheezing, rales, or rhonchi. Bilateral, equal lung expansion.   Chest: L sided pleur-ex catheter in place. c/d/i. TTP overlying catheter site.   Abdominal: Soft. Nondistended. Nontender. No rebound or guarding.   Extremities. Pelvis stable. No lower extremity edema. Symmetric calves.  Skin: No rashes.   Neuro: AAOx3. No focal neurological deficits.

## 2021-03-16 NOTE — ED PROVIDER NOTE - OBJECTIVE STATEMENT
40f h/o Hodgkin's Lymphoma - active, s/p large pleural effusion and PTX s/p thoracentesis and pleurex catheter placement by pulm p/w tachypnea. onset today. sudden in onset, a/w pleuritic cp. pt reports cough over the past couple of days - dry. admits to subj fever and chills, generalized abdominal pain, nausea. denies vomiting, diarrhea, joint pain, LE swelling.

## 2021-03-16 NOTE — ED ADULT TRIAGE NOTE - CHIEF COMPLAINT QUOTE
patient c/o shortness of breath and back pain. denies fevers. has a drain in her back to drain fluid from the lung. hx of lymphoma

## 2021-03-17 ENCOUNTER — TRANSCRIPTION ENCOUNTER (OUTPATIENT)
Age: 41
End: 2021-03-17

## 2021-03-17 LAB
ALBUMIN SERPL ELPH-MCNC: 3 G/DL — LOW (ref 3.5–5.2)
ALP SERPL-CCNC: 79 U/L — SIGNIFICANT CHANGE UP (ref 30–115)
ALT FLD-CCNC: 16 U/L — SIGNIFICANT CHANGE UP (ref 0–41)
ANION GAP SERPL CALC-SCNC: 10 MMOL/L — SIGNIFICANT CHANGE UP (ref 7–14)
APPEARANCE UR: CLEAR — SIGNIFICANT CHANGE UP
APTT BLD: 35.1 SEC — SIGNIFICANT CHANGE UP (ref 27–39.2)
AST SERPL-CCNC: 15 U/L — SIGNIFICANT CHANGE UP (ref 0–41)
BASOPHILS # BLD AUTO: 0.01 K/UL — SIGNIFICANT CHANGE UP (ref 0–0.2)
BASOPHILS NFR BLD AUTO: 0.1 % — SIGNIFICANT CHANGE UP (ref 0–1)
BILIRUB SERPL-MCNC: 0.2 MG/DL — SIGNIFICANT CHANGE UP (ref 0.2–1.2)
BILIRUB UR-MCNC: NEGATIVE — SIGNIFICANT CHANGE UP
BLD GP AB SCN SERPL QL: SIGNIFICANT CHANGE UP
BUN SERPL-MCNC: 11 MG/DL — SIGNIFICANT CHANGE UP (ref 10–20)
CALCIUM SERPL-MCNC: 9.4 MG/DL — SIGNIFICANT CHANGE UP (ref 8.5–10.1)
CHLORIDE SERPL-SCNC: 98 MMOL/L — SIGNIFICANT CHANGE UP (ref 98–110)
CO2 SERPL-SCNC: 27 MMOL/L — SIGNIFICANT CHANGE UP (ref 17–32)
COLOR SPEC: COLORLESS — SIGNIFICANT CHANGE UP
CREAT SERPL-MCNC: 0.6 MG/DL — LOW (ref 0.7–1.5)
DIFF PNL FLD: NEGATIVE — SIGNIFICANT CHANGE UP
EOSINOPHIL # BLD AUTO: 0.05 K/UL — SIGNIFICANT CHANGE UP (ref 0–0.7)
EOSINOPHIL NFR BLD AUTO: 0.4 % — SIGNIFICANT CHANGE UP (ref 0–8)
GLUCOSE SERPL-MCNC: 92 MG/DL — SIGNIFICANT CHANGE UP (ref 70–99)
GLUCOSE UR QL: NEGATIVE — SIGNIFICANT CHANGE UP
HCT VFR BLD CALC: 32.5 % — LOW (ref 37–47)
HGB BLD-MCNC: 10 G/DL — LOW (ref 12–16)
IMM GRANULOCYTES NFR BLD AUTO: 0.2 % — SIGNIFICANT CHANGE UP (ref 0.1–0.3)
INR BLD: 1.37 RATIO — HIGH (ref 0.65–1.3)
KETONES UR-MCNC: NEGATIVE — SIGNIFICANT CHANGE UP
LACTATE SERPL-SCNC: 0.7 MMOL/L — SIGNIFICANT CHANGE UP (ref 0.7–2)
LEUKOCYTE ESTERASE UR-ACNC: NEGATIVE — SIGNIFICANT CHANGE UP
LYMPHOCYTES # BLD AUTO: 1.01 K/UL — LOW (ref 1.2–3.4)
LYMPHOCYTES # BLD AUTO: 8.4 % — LOW (ref 20.5–51.1)
MAGNESIUM SERPL-MCNC: 1.8 MG/DL — SIGNIFICANT CHANGE UP (ref 1.8–2.4)
MCHC RBC-ENTMCNC: 24.2 PG — LOW (ref 27–31)
MCHC RBC-ENTMCNC: 30.8 G/DL — LOW (ref 32–37)
MCV RBC AUTO: 78.7 FL — LOW (ref 81–99)
MONOCYTES # BLD AUTO: 0.76 K/UL — HIGH (ref 0.1–0.6)
MONOCYTES NFR BLD AUTO: 6.3 % — SIGNIFICANT CHANGE UP (ref 1.7–9.3)
NEUTROPHILS # BLD AUTO: 10.18 K/UL — HIGH (ref 1.4–6.5)
NEUTROPHILS NFR BLD AUTO: 84.6 % — HIGH (ref 42.2–75.2)
NITRITE UR-MCNC: NEGATIVE — SIGNIFICANT CHANGE UP
NRBC # BLD: 0 /100 WBCS — SIGNIFICANT CHANGE UP (ref 0–0)
PH UR: 6 — SIGNIFICANT CHANGE UP (ref 5–8)
PLATELET # BLD AUTO: 416 K/UL — HIGH (ref 130–400)
POTASSIUM SERPL-MCNC: 4.4 MMOL/L — SIGNIFICANT CHANGE UP (ref 3.5–5)
POTASSIUM SERPL-SCNC: 4.4 MMOL/L — SIGNIFICANT CHANGE UP (ref 3.5–5)
PROT SERPL-MCNC: 5.7 G/DL — LOW (ref 6–8)
PROT UR-MCNC: NEGATIVE — SIGNIFICANT CHANGE UP
PROTHROM AB SERPL-ACNC: 15.8 SEC — HIGH (ref 9.95–12.87)
RBC # BLD: 4.13 M/UL — LOW (ref 4.2–5.4)
RBC # FLD: 16.4 % — HIGH (ref 11.5–14.5)
SODIUM SERPL-SCNC: 135 MMOL/L — SIGNIFICANT CHANGE UP (ref 135–146)
SP GR SPEC: 1.02 — SIGNIFICANT CHANGE UP (ref 1.01–1.03)
TROPONIN T SERPL-MCNC: <0.01 NG/ML — SIGNIFICANT CHANGE UP
UROBILINOGEN FLD QL: SIGNIFICANT CHANGE UP
WBC # BLD: 12.04 K/UL — HIGH (ref 4.8–10.8)
WBC # FLD AUTO: 12.04 K/UL — HIGH (ref 4.8–10.8)

## 2021-03-17 PROCEDURE — 99223 1ST HOSP IP/OBS HIGH 75: CPT

## 2021-03-17 PROCEDURE — 93010 ELECTROCARDIOGRAM REPORT: CPT

## 2021-03-17 PROCEDURE — 99233 SBSQ HOSP IP/OBS HIGH 50: CPT

## 2021-03-17 RX ORDER — SACCHAROMYCES BOULARDII 250 MG
250 POWDER IN PACKET (EA) ORAL
Refills: 0 | Status: DISCONTINUED | OUTPATIENT
Start: 2021-03-17 | End: 2021-03-18

## 2021-03-17 RX ORDER — MORPHINE SULFATE 50 MG/1
2 CAPSULE, EXTENDED RELEASE ORAL EVERY 4 HOURS
Refills: 0 | Status: DISCONTINUED | OUTPATIENT
Start: 2021-03-17 | End: 2021-03-18

## 2021-03-17 RX ORDER — ACETAMINOPHEN 500 MG
650 TABLET ORAL EVERY 6 HOURS
Refills: 0 | Status: DISCONTINUED | OUTPATIENT
Start: 2021-03-17 | End: 2021-03-18

## 2021-03-17 RX ORDER — CHLORHEXIDINE GLUCONATE 213 G/1000ML
1 SOLUTION TOPICAL
Refills: 0 | Status: DISCONTINUED | OUTPATIENT
Start: 2021-03-17 | End: 2021-03-18

## 2021-03-17 RX ORDER — DIPHENHYDRAMINE HCL 50 MG
25 CAPSULE ORAL ONCE
Refills: 0 | Status: COMPLETED | OUTPATIENT
Start: 2021-03-17 | End: 2021-03-17

## 2021-03-17 RX ORDER — DIPHENHYDRAMINE HCL 50 MG
25 CAPSULE ORAL ONCE
Refills: 0 | Status: DISCONTINUED | OUTPATIENT
Start: 2021-03-17 | End: 2021-03-17

## 2021-03-17 RX ORDER — SERTRALINE 25 MG/1
25 TABLET, FILM COATED ORAL
Refills: 0 | Status: DISCONTINUED | OUTPATIENT
Start: 2021-03-17 | End: 2021-03-18

## 2021-03-17 RX ORDER — ENOXAPARIN SODIUM 100 MG/ML
40 INJECTION SUBCUTANEOUS DAILY
Refills: 0 | Status: DISCONTINUED | OUTPATIENT
Start: 2021-03-17 | End: 2021-03-18

## 2021-03-17 RX ORDER — SIMETHICONE 80 MG/1
80 TABLET, CHEWABLE ORAL EVERY 6 HOURS
Refills: 0 | Status: DISCONTINUED | OUTPATIENT
Start: 2021-03-17 | End: 2021-03-18

## 2021-03-17 RX ORDER — KETOROLAC TROMETHAMINE 30 MG/ML
15 SYRINGE (ML) INJECTION THREE TIMES A DAY
Refills: 0 | Status: DISCONTINUED | OUTPATIENT
Start: 2021-03-17 | End: 2021-03-18

## 2021-03-17 RX ORDER — KETOROLAC TROMETHAMINE 30 MG/ML
15 SYRINGE (ML) INJECTION ONCE
Refills: 0 | Status: DISCONTINUED | OUTPATIENT
Start: 2021-03-17 | End: 2021-03-17

## 2021-03-17 RX ORDER — HEPARIN SODIUM 5000 [USP'U]/ML
5000 INJECTION INTRAVENOUS; SUBCUTANEOUS EVERY 12 HOURS
Refills: 0 | Status: DISCONTINUED | OUTPATIENT
Start: 2021-03-17 | End: 2021-03-17

## 2021-03-17 RX ORDER — LANOLIN ALCOHOL/MO/W.PET/CERES
5 CREAM (GRAM) TOPICAL AT BEDTIME
Refills: 0 | Status: DISCONTINUED | OUTPATIENT
Start: 2021-03-17 | End: 2021-03-18

## 2021-03-17 RX ORDER — PANTOPRAZOLE SODIUM 20 MG/1
40 TABLET, DELAYED RELEASE ORAL
Refills: 0 | Status: DISCONTINUED | OUTPATIENT
Start: 2021-03-17 | End: 2021-03-18

## 2021-03-17 RX ORDER — SENNA PLUS 8.6 MG/1
2 TABLET ORAL AT BEDTIME
Refills: 0 | Status: DISCONTINUED | OUTPATIENT
Start: 2021-03-17 | End: 2021-03-18

## 2021-03-17 RX ORDER — ALPRAZOLAM 0.25 MG
0.25 TABLET ORAL DAILY
Refills: 0 | Status: DISCONTINUED | OUTPATIENT
Start: 2021-03-17 | End: 2021-03-18

## 2021-03-17 RX ORDER — DIPHENHYDRAMINE HCL 50 MG
25 CAPSULE ORAL EVERY 4 HOURS
Refills: 0 | Status: DISCONTINUED | OUTPATIENT
Start: 2021-03-17 | End: 2021-03-18

## 2021-03-17 RX ADMIN — SIMETHICONE 80 MILLIGRAM(S): 80 TABLET, CHEWABLE ORAL at 11:24

## 2021-03-17 RX ADMIN — MORPHINE SULFATE 2 MILLIGRAM(S): 50 CAPSULE, EXTENDED RELEASE ORAL at 20:41

## 2021-03-17 RX ADMIN — Medication 250 MILLIGRAM(S): at 17:52

## 2021-03-17 RX ADMIN — ONDANSETRON 4 MILLIGRAM(S): 8 TABLET, FILM COATED ORAL at 11:35

## 2021-03-17 RX ADMIN — Medication 25 MILLIGRAM(S): at 20:14

## 2021-03-17 RX ADMIN — SERTRALINE 25 MILLIGRAM(S): 25 TABLET, FILM COATED ORAL at 21:44

## 2021-03-17 RX ADMIN — SIMETHICONE 80 MILLIGRAM(S): 80 TABLET, CHEWABLE ORAL at 17:52

## 2021-03-17 RX ADMIN — MORPHINE SULFATE 4 MILLIGRAM(S): 50 CAPSULE, EXTENDED RELEASE ORAL at 02:40

## 2021-03-17 RX ADMIN — Medication 15 MILLIGRAM(S): at 06:12

## 2021-03-17 RX ADMIN — ENOXAPARIN SODIUM 40 MILLIGRAM(S): 100 INJECTION SUBCUTANEOUS at 11:21

## 2021-03-17 RX ADMIN — Medication 15 MILLIGRAM(S): at 11:22

## 2021-03-17 RX ADMIN — SENNA PLUS 2 TABLET(S): 8.6 TABLET ORAL at 21:44

## 2021-03-17 RX ADMIN — PANTOPRAZOLE SODIUM 40 MILLIGRAM(S): 20 TABLET, DELAYED RELEASE ORAL at 06:23

## 2021-03-17 RX ADMIN — Medication 15 MILLIGRAM(S): at 15:03

## 2021-03-17 RX ADMIN — Medication 5 MILLIGRAM(S): at 21:44

## 2021-03-17 RX ADMIN — Medication 15 MILLIGRAM(S): at 12:00

## 2021-03-17 RX ADMIN — HEPARIN SODIUM 5000 UNIT(S): 5000 INJECTION INTRAVENOUS; SUBCUTANEOUS at 06:21

## 2021-03-17 RX ADMIN — ONDANSETRON 4 MILLIGRAM(S): 8 TABLET, FILM COATED ORAL at 00:10

## 2021-03-17 RX ADMIN — MORPHINE SULFATE 2 MILLIGRAM(S): 50 CAPSULE, EXTENDED RELEASE ORAL at 06:00

## 2021-03-17 RX ADMIN — CHLORHEXIDINE GLUCONATE 1 APPLICATION(S): 213 SOLUTION TOPICAL at 11:24

## 2021-03-17 RX ADMIN — Medication 15 MILLIGRAM(S): at 23:59

## 2021-03-17 RX ADMIN — Medication 0.25 MILLIGRAM(S): at 11:23

## 2021-03-17 RX ADMIN — Medication 15 MILLIGRAM(S): at 04:12

## 2021-03-17 RX ADMIN — SIMETHICONE 80 MILLIGRAM(S): 80 TABLET, CHEWABLE ORAL at 06:21

## 2021-03-17 RX ADMIN — SERTRALINE 25 MILLIGRAM(S): 25 TABLET, FILM COATED ORAL at 06:35

## 2021-03-17 RX ADMIN — MORPHINE SULFATE 2 MILLIGRAM(S): 50 CAPSULE, EXTENDED RELEASE ORAL at 20:11

## 2021-03-17 RX ADMIN — Medication 15 MILLIGRAM(S): at 15:30

## 2021-03-17 RX ADMIN — Medication 25 MILLIGRAM(S): at 02:47

## 2021-03-17 RX ADMIN — MORPHINE SULFATE 4 MILLIGRAM(S): 50 CAPSULE, EXTENDED RELEASE ORAL at 00:07

## 2021-03-17 RX ADMIN — SIMETHICONE 80 MILLIGRAM(S): 80 TABLET, CHEWABLE ORAL at 23:59

## 2021-03-17 RX ADMIN — Medication 5 MILLIGRAM(S): at 02:45

## 2021-03-17 RX ADMIN — Medication 25 MILLIGRAM(S): at 06:35

## 2021-03-17 NOTE — PHYSICAL EXAM
[No Acute Distress] : no acute distress [Normal Oropharynx] : normal oropharynx [Normal Appearance] : normal appearance [No Neck Mass] : no neck mass [Normal Rate/Rhythm] : normal rate/rhythm [Normal S1, S2] : normal s1, s2 [No Murmurs] : no murmurs [No Resp Distress] : no resp distress [Clear to Auscultation Bilaterally] : clear to auscultation bilaterally [No Abnormalities] : no abnormalities [Benign] : benign [Normal Gait] : normal gait [No Clubbing] : no clubbing [No Cyanosis] : no cyanosis [No Edema] : no edema [FROM] : FROM [Normal Color/ Pigmentation] : normal color/ pigmentation [No Focal Deficits] : no focal deficits [Oriented x3] : oriented x3 [Normal Affect] : normal affect [TextBox_68] : reduced breath sounds at left lung base, left chest wall chest tube with dressing in tact, surgical site is clean and healing

## 2021-03-17 NOTE — ASSESSMENT
[FreeTextEntry1] : 41 yo F comes for evaluation of pleural effusion. She was diagnosed with Hodgkin lymphoma 9/2020, received one dose of chemo in [place] and had an anaphylactic reaction. She has been having B symptoms and in February 2021 started having dyspnea and was hospitalized for large pleural effusion. She underwent two thoracentesis and the fluid reaccumulated rapidly so pleurx catheter was placed on 3/5/21. She has been having VNS drain 500cc daily for the past week. Yesterday nurse came and drained only a little fluid, she noted some air and patient had pain.\par \par # Recurrent exudative pleural effusion likely malignant in setting of active lymphoma though cytology did not reveal malignant cells. s/p Left pleurx catheter 1 week ago. Today 200cc serous fluid drained and patient complained of pain radiating to shoulder, air bubbles also noted in catheter during drainage. Suspect entrapped/trapped lung due to chronic pleural effusion. Will do CXR, may need CT chest. Patient starting immunotherapy soon. RTC 2-3 weeks. Social work to set up nursing services for drainage - can drain every other day as patient endorses financial issues with the costs of the bottles. Daily drainage is advised however as chances of pleurodesis are greater with daily drainage.

## 2021-03-17 NOTE — CONSULT NOTE ADULT - ASSESSMENT
ASSESSMENT  40 year old F patient with Hx of Hodgkin lymphoma ( Active, s/p one dose of ABVD ) who present with fevers and fainting episode    IMPRESSION  #Hodgekin Lymphoma s/p one dose of ABVD with intolerance  #Left pleural effusion s/p Pleurx catheter   #CT Angio Chest PE Protocol w/ IV Cont (03.16.21 @ 17:48): No pulmonary embolus or CT evidence of acute right heart strain. Interval placement of a left pleural catheter with significant decrease in the left pleural effusion. Small residual hydropneumothorax is present with areas of loculation. Small-to-moderate size right pleural effusion without significant change. Large anterior mediastinal mass extending into the supraclavicular space encasing the arch vessels and interspersed structures. Bilateral hilar, supraclavicular and axillary bulky lymphadenopathy. Findings consistent with known history of lymphoma. 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 Additionally, increased aeration left lung revealing innumerable left lung nodules. These can be infectious or metastatic in etiology. Interlobular septal thickening, peribronchial and perivascular cuffing on the left likely secondary to lymphatic congestion from hilar lymphadenopathy. Moderate pericardial effusion, increased since prior exam. Left pectoralis muscle thickening and chest wall edema, decreased since prior exam.    #Obesity BMI (kg/m2): 22.7, 24.6  #Abx allergy: NKDA    RECOMMENDATIONS  - please obtain pleural fluid for analysis and bacterial/fungal cultures  - check galactomannan, fungitell, serum Crypt antigen  - if febrile, can start vancomycin + cefepime   - follow-up procalcitonin     Please call or message on [employer] Teams if with any questions.  Spectra [phone]

## 2021-03-17 NOTE — PROGRESS NOTE ADULT - SUBJECTIVE AND OBJECTIVE BOX
pt with orthostatic hypotension and tachycardia  pt has pleurovax for pericardial effusion   still draining large amount   being seen by pulmonary and ID

## 2021-03-17 NOTE — HISTORY OF PRESENT ILLNESS
[TextBox_4] : 39 yo F comes for evaluation of pleural effusion. She was diagnosed with Hodgkin lymphoma 9/2020, received one dose of chemo in [place] and had an anaphylactic reaction. She has been having B symptoms and in February 2021 started having dyspnea and was hospitalized for large pleural effusion. She underwent two thoracentesis and the fluid reaccumulated rapidly so pleurx catheter was placed on 3/5/21. She has been having VNS drain 500cc daily for the past week. Yesterday nurse came and drained only a little fluid, she noted some air and patient had pain.\par \par medical: Hodgkin lymphoma diagnosed sept 2020 received 1 dose of chemo had allergic reaction [bronchospasm]. Biopsy was done at [facility] of the chest wall lesion.\par surgeries: c section, pleurx catheter \par family: HTN in parents\par social: former smoker, 3 PYs quit at 23, no etoh or illicit drugs, works for [employer], works from home, no exposure to inorganic dusts, no pets

## 2021-03-17 NOTE — PROGRESS NOTE ADULT - ASSESSMENT
#Syncope  - possible vasovegal event but r/o cardiac causes.   - Transfer to telemetry for observation  - check 2d Echo,  - repeat EKG  - trend trops  -Check orthostatics.     Pericardial Effusion: check 2D Echo, no evidence of heart dysfunction on CT. May benefit from lasix if orthostatic negative. Low voltage on EKG noted, no clear signs of pericarditis. Pulm follow up     SIRS (Fever, Leukocytosis, Tachycardia): unclear source of an infection, possible B symptoms? ID eval, s/p vanco and cefepime in ED. Follow up blood, urine cultures. Check Procal and pleural fluid culture, gram stain, glucose, and protein. Observe off antibiotics for now.     Advanced Hodgkin's Lymphoma: Heme-Onc following with plans to start immuno therapy on Fri. 3/19.     Anxiety: Continue home medications, it may also be driving some of her tachycardia.     Chronic Back pain: continue with toradol and tylenol. Morphine for severe pain.    Pruritis: Benadryl PRN.     DVT ppx: Lovenox/Heparin  GI ppx: Protonix while taking toradol.   GOC: Full code. #Syncope  - possible vasovegal event but r/o cardiac causes  - EKG NSR  - 2d Echo ordered  - orthostatics negative    # Pericardial Effusion  - no evidence of heart dysfunction on CT  - no signs of pericarditis  - f/u pulm  - may benefit from lasix if orthostatic negative    # SIRS (Fever, Leukocytosis, Tachycardia)  - unclear source of an infection, possible B symptoms?   - s/p vanco and cefepime in ED  - ID eval (consulted)  - blood and urine cultures negative  - pleural fluid culture, gram stain, glucose and protein negative    # Advanced Hodgkin's Lymphoma: Heme-Onc following with plans to start immuno therapy on Fri. 3/19.     Anxiety: Continue home medications, it may also be driving some of her tachycardia.     Chronic Back pain: continue with toradol and tylenol. Morphine for severe pain.    Pruritis: Benadryl PRN.     DVT ppx: Lovenox/Heparin  GI ppx: Protonix while taking toradol.   GOC: Full code. #Syncope  - possible vasovegal event but r/o cardiac causes  - EKG NSR  - 2d Echo ordered  - orthostatics negative    # Pericardial Effusion  - no evidence of heart dysfunction on CT  - no signs of pericarditis  - f/u pulm  - may benefit from lasix if orthostatic negative    # SIRS (Fever, Leukocytosis, Tachycardia)  - unclear source of an infection, possible B symptoms?   - s/p vanco and cefepime in ED  - blood and urine cultures negative  - pleural fluid culture, gram stain, glucose and protein negative  - ID eval (consulted)    # Advanced Hodgkin's Lymphoma: Heme-Onc following with plans to start immuno therapy on Fri. 3/19.     Anxiety: Continue home medications, it may also be driving some of her tachycardia.     Chronic Back pain: continue with toradol and tylenol. Morphine for severe pain.    Pruritis: Benadryl PRN.     DVT ppx: Lovenox/Heparin  GI ppx: Protonix while taking toradol.   GOC: Full code. #Syncope  - possible vasovegal event but r/o cardiac causes  - EKG NSR  - orthostatics negative  - 2d Echo ordered    # Pericardial Effusion  - no evidence of heart dysfunction on CT  - no signs of pericarditis  - f/u pulm  - may benefit from lasix if orthostatic negative    # SIRS (Fever, Leukocytosis, Tachycardia)  - unclear source of an infection, possible B symptoms?   - s/p vanco and cefepime in ED  - blood and urine cultures negative  - pleural fluid culture, gram stain, glucose and protein negative  - ID eval (consulted)    # Advanced Hodgkin's Lymphoma  - Heme-Onc following with plans to start outpt immunotherapy on Fri. 3/19.     # Anxiety  - Continue home medications, it may also be driving some of her tachycardia.     # Chronic Back pain  - continue with toradol and tylenol PRN  - morphine for severe pain PRN    Pruritis: Benadryl PRN.     DVT ppx: Lovenox/Heparin  GI ppx: Protonix while taking toradol.   GOC: Full code. 40 year old F patient with Hx of Hodgkin lymphoma ( Active, s/p one dose of ABVD ) was sent to the ED by her Pulmonologist Dr Fitch after pt stated she fainted on [place] 3/14.    # Syncope - possible vasovagal event but r/o cardiac causes  - EKG NSR, with low voltage.  - orthostatics negative  - 2d Echo ordered    # Pericardial Effusion  - no evidence of heart dysfunction on CT  - no signs of pericarditis  - f/u pulm  - may benefit from lasix if orthostatic negative    # SIRS (Fever, Leukocytosis, Tachycardia)  - unclear source of an infection, possible B symptoms? vs. infectious etiology  - s/p vanco and cefepime in ED  - blood and urine cultures negative  - pleural fluid gram stain, glucose and protein negative  - ID eval (consulted)  - f/u pleural fluid (sent by pulmonary on 3/17)    # Advanced Hodgkin's Lymphoma  - Heme-Onc following with plans to start outpt immunotherapy on Fri. 3/19.   - Spoke with Dr. Pham on 3/17    # Anxiety  - Continue home medications, it may also be driving some of her tachycardia.     # Chronic Back pain  - continue with toradol and tylenol PRN  - morphine for severe pain PRN. will try to taper down    Pruritis: Benadryl PRN.     DVT ppx: Lovenox  GI ppx: Protonix while taking toradol.   GOC: Full code.

## 2021-03-17 NOTE — PROGRESS NOTE ADULT - ASSESSMENT
RAPIDLY PROGRESSING HODGKINS LYMPHOMA PROGNOSIS V poor if left untreated   untreated as after  one dose of chemo got sev anaphylactic reaction   recurrent pleural effusion and pericardial effusion    pt will be  starting immunotherapy on friday as out pt if stabalized and discaharged   discussed with medical resident     will follow with you     zach Pineda   [phone]

## 2021-03-17 NOTE — CHART NOTE - NSCHARTNOTEFT_GEN_A_CORE
Pt was admitted by nocturnist this morning. Pt presented after syncope at home during draining of pleural effusion via Pleurex catheter by nurse. CT chest was significant for pericardial effusion.   Awaiting ECHO.  Pulm recommended r/o infectious process, work up ordered.  NO ABx for now.   I believe pt does not have active infection and she will benefit from Immunotherapy started by heme/onc ASAP.   If remains afebrile and ECHO back with no significant pericardial effusion pt can be discharged with Op f/u of infectious work up.

## 2021-03-17 NOTE — PROGRESS NOTE ADULT - SUBJECTIVE AND OBJECTIVE BOX
SUBJECTIVE:    Patient is a 40y old  Female who presents with a chief complaint of     Currently admitted to medicine with the primary diagnosis of Loculated pleural effusion       Today is hospital day 1d. Patient was seen and examined at bedside. This morning she is resting comfortably in bed and reports no new issues or overnight events.     PAST MEDICAL & SURGICAL HISTORY  PAST MEDICAL & SURGICAL HISTORY:  Hodgkin lymphoma  Active      SOCIAL HISTORY:    ALLERGIES:  No Known Allergies    MEDICATIONS:  STANDING MEDICATIONS  ALPRAZolam 0.25 milliGRAM(s) Oral daily  chlorhexidine 4% Liquid 1 Application(s) Topical <User Schedule>  enoxaparin Injectable 40 milliGRAM(s) SubCutaneous daily  melatonin 5 milliGRAM(s) Oral at bedtime  pantoprazole    Tablet 40 milliGRAM(s) Oral before breakfast  senna 2 Tablet(s) Oral at bedtime  sertraline 25 milliGRAM(s) Oral two times a day  simethicone 80 milliGRAM(s) Chew every 6 hours    PRN MEDICATIONS  acetaminophen   Tablet .. 650 milliGRAM(s) Oral every 6 hours PRN  acetaminophen   Tablet .. 650 milliGRAM(s) Oral every 6 hours PRN  diphenhydrAMINE 25 milliGRAM(s) Oral every 4 hours PRN  ketorolac   Injectable 15 milliGRAM(s) IV Push three times a day PRN  morphine  - Injectable 4 milliGRAM(s) IV Push every 4 hours PRN  ondansetron Injectable 4 milliGRAM(s) IV Push every 8 hours PRN    VITALS:   T(F): 97.1  HR: 97  BP: 90/54  RR: 20  SpO2: 97%    LABS:                        10.0   12.04 )-----------( 416      ( 17 Mar 2021 06:33 )             32.5     03-17    135  |  98  |  11  ----------------------------<  92  4.4   |  27  |  0.6<L>    Ca    9.4      17 Mar 2021 06:33  Mg     1.8     03-17    TPro  5.7<L>  /  Alb  3.0<L>  /  TBili  0.2  /  DBili  x   /  AST  15  /  ALT  16  /  AlkPhos  79  03-17    PT/INR - ( 17 Mar 2021 06:33 )   PT: 15.80 sec;   INR: 1.37 ratio         PTT - ( 17 Mar 2021 06:33 )  PTT:35.1 sec  Urinalysis Basic - ( 17 Mar 2021 05:00 )    Color: Colorless / Appearance: Clear / S.021 / pH: x  Gluc: x / Ketone: Negative  / Bili: Negative / Urobili: <2 mg/dL   Blood: x / Protein: Negative / Nitrite: Negative   Leuk Esterase: Negative / RBC: x / WBC x   Sq Epi: x / Non Sq Epi: x / Bacteria: x        Lactate, Blood: 0.7 mmol/L (21 @ 06:33)  Troponin T, Serum: <0.01 ng/mL (21 @ 06:33)  Troponin T, Serum: <0.01 ng/mL (21 @ 15:59)  Lactate, Blood: 0.9 mmol/L (21 @ 15:59)      CARDIAC MARKERS ( 17 Mar 2021 06:33 )  x     / <0.01 ng/mL / x     / x     / x      CARDIAC MARKERS ( 16 Mar 2021 15:59 )  x     / <0.01 ng/mL / x     / x     / x          RADIOLOGY:    PHYSICAL EXAM:  GENERAL: NAD, speaks in full sentences, no signs of respiratory distress  HEAD: Atraumatic  NECK: Supple  CHEST/LUNG: Clear to auscultation bilaterally; No wheeze or crackles  HEART: S1, S2; RRR; No murmurs, rubs, or gallops  ABDOMEN: BS+; Soft, Non-tender, Non-distended  EXTREMITIES:  2+ Peripheral Pulses, No clubbing, cyanosis, or edema  PSYCH: AAOx3  NEUROLOGY: non-focal  SKIN: No rashes or lesions     SUBJECTIVE:    Patient is a 40y old  Female who presents with a chief complaint of fainting.    Currently admitted to medicine with the primary diagnosis of loculated pleural effusion secondary to advanced Hodgkin Lymphoma.     Today is hospital day 1d. Patient was seen and examined at bedside. This morning she is resting comfortably in bed and reports no new issues or overnight events.       PAST MEDICAL & SURGICAL HISTORY  PAST MEDICAL & SURGICAL HISTORY:  Hodgkin lymphoma  Active      SOCIAL HISTORY:  Social History (marital status, living situation, occupation, tobacco use, alcohol and drug use, and sexual history): never smoked    ALLERGIES:  No Known Allergies    MEDICATIONS:  STANDING MEDICATIONS  ALPRAZolam 0.25 milliGRAM(s) Oral daily  chlorhexidine 4% Liquid 1 Application(s) Topical <User Schedule>  enoxaparin Injectable 40 milliGRAM(s) SubCutaneous daily  melatonin 5 milliGRAM(s) Oral at bedtime  pantoprazole    Tablet 40 milliGRAM(s) Oral before breakfast  senna 2 Tablet(s) Oral at bedtime  sertraline 25 milliGRAM(s) Oral two times a day  simethicone 80 milliGRAM(s) Chew every 6 hours    PRN MEDICATIONS  acetaminophen   Tablet .. 650 milliGRAM(s) Oral every 6 hours PRN  acetaminophen   Tablet .. 650 milliGRAM(s) Oral every 6 hours PRN  diphenhydrAMINE 25 milliGRAM(s) Oral every 4 hours PRN  ketorolac   Injectable 15 milliGRAM(s) IV Push three times a day PRN  morphine  - Injectable 4 milliGRAM(s) IV Push every 4 hours PRN  ondansetron Injectable 4 milliGRAM(s) IV Push every 8 hours PRN    VITALS:   T(F): 97.1  HR: 97  BP: 90/54  RR: 20  SpO2: 97%    LABS:                        10.0   12.04 )-----------( 416      ( 17 Mar 2021 06:33 )             32.5     03-17    135  |  98  |  11  ----------------------------<  92  4.4   |  27  |  0.6<L>    Ca    9.4      17 Mar 2021 06:33  Mg     1.8     03-17    TPro  5.7<L>  /  Alb  3.0<L>  /  TBili  0.2  /  DBili  x   /  AST  15  /  ALT  16  /  AlkPhos  79  03-    PT/INR - ( 17 Mar 2021 06:33 )   PT: 15.80 sec;   INR: 1.37 ratio         PTT - ( 17 Mar 2021 06:33 )  PTT:35.1 sec  Urinalysis Basic - ( 17 Mar 2021 05:00 )    Color: Colorless / Appearance: Clear / S.021 / pH: x  Gluc: x / Ketone: Negative  / Bili: Negative / Urobili: <2 mg/dL   Blood: x / Protein: Negative / Nitrite: Negative   Leuk Esterase: Negative / RBC: x / WBC x   Sq Epi: x / Non Sq Epi: x / Bacteria: x        Lactate, Blood: 0.7 mmol/L (21 @ 06:33)  Troponin T, Serum: <0.01 ng/mL (21 @ 06:33)  Troponin T, Serum: <0.01 ng/mL (21 @ 15:59)  Lactate, Blood: 0.9 mmol/L (21 @ 15:59)      CARDIAC MARKERS ( 17 Mar 2021 06:33 )  x     / <0.01 ng/mL / x     / x     / x      CARDIAC MARKERS ( 16 Mar 2021 15:59 )  x     / <0.01 ng/mL / x     / x     / x          RADIOLOGY:    PHYSICAL EXAM:  GENERAL: NAD, speaks in full sentences, no signs of respiratory distress  HEAD: Atraumatic  NECK: Supple  CHEST/LUNG: Clear to auscultation bilaterally; No wheeze or crackles  HEART: S1, S2; RRR; No murmurs, rubs, or gallops  ABDOMEN: BS+; Soft, Non-tender, Non-distended  EXTREMITIES:  2+ Peripheral Pulses, No clubbing, cyanosis, or edema  PSYCH: AAOx3  NEUROLOGY: non-focal  SKIN: No rashes or lesions, pleurX cath clean and in tact.     SUBJECTIVE:    Patient is a 40y old  Female who presents with a chief complaint of dizziness and fainting.    Currently admitted to medicine with the primary diagnosis of loculated pleural effusion secondary to advanced Hodgkin Lymphoma.     Today is hospital day 1d. Patient was seen and examined at bedside. This morning she is resting comfortably in bed and reports no new issues or overnight events.       PAST MEDICAL & SURGICAL HISTORY  PAST MEDICAL & SURGICAL HISTORY:  Hodgkin lymphoma  Active      SOCIAL HISTORY:  Social History (marital status, living situation, occupation, tobacco use, alcohol and drug use, and sexual history): never smoked    ALLERGIES:  No Known Allergies    MEDICATIONS:  STANDING MEDICATIONS  ALPRAZolam 0.25 milliGRAM(s) Oral daily  chlorhexidine 4% Liquid 1 Application(s) Topical <User Schedule>  enoxaparin Injectable 40 milliGRAM(s) SubCutaneous daily  melatonin 5 milliGRAM(s) Oral at bedtime  pantoprazole    Tablet 40 milliGRAM(s) Oral before breakfast  senna 2 Tablet(s) Oral at bedtime  sertraline 25 milliGRAM(s) Oral two times a day  simethicone 80 milliGRAM(s) Chew every 6 hours    PRN MEDICATIONS  acetaminophen   Tablet .. 650 milliGRAM(s) Oral every 6 hours PRN  acetaminophen   Tablet .. 650 milliGRAM(s) Oral every 6 hours PRN  diphenhydrAMINE 25 milliGRAM(s) Oral every 4 hours PRN  ketorolac   Injectable 15 milliGRAM(s) IV Push three times a day PRN  morphine  - Injectable 4 milliGRAM(s) IV Push every 4 hours PRN  ondansetron Injectable 4 milliGRAM(s) IV Push every 8 hours PRN    VITALS:   T(F): 97.1  HR: 97  BP: 90/54  RR: 20  SpO2: 97%    LABS:                        10.0   12.04 )-----------( 416      ( 17 Mar 2021 06:33 )             32.5     03-17    135  |  98  |  11  ----------------------------<  92  4.4   |  27  |  0.6<L>    Ca    9.4      17 Mar 2021 06:33  Mg     1.8     03-17    TPro  5.7<L>  /  Alb  3.0<L>  /  TBili  0.2  /  DBili  x   /  AST  15  /  ALT  16  /  AlkPhos  79  -    PT/INR - ( 17 Mar 2021 06:33 )   PT: 15.80 sec;   INR: 1.37 ratio         PTT - ( 17 Mar 2021 06:33 )  PTT:35.1 sec  Urinalysis Basic - ( 17 Mar 2021 05:00 )    Color: Colorless / Appearance: Clear / S.021 / pH: x  Gluc: x / Ketone: Negative  / Bili: Negative / Urobili: <2 mg/dL   Blood: x / Protein: Negative / Nitrite: Negative   Leuk Esterase: Negative / RBC: x / WBC x   Sq Epi: x / Non Sq Epi: x / Bacteria: x        Lactate, Blood: 0.7 mmol/L (21 @ 06:33)  Troponin T, Serum: <0.01 ng/mL (21 @ 06:33)  Troponin T, Serum: <0.01 ng/mL (21 @ 15:59)  Lactate, Blood: 0.9 mmol/L (21 @ 15:59)      CARDIAC MARKERS ( 17 Mar 2021 06:33 )  x     / <0.01 ng/mL / x     / x     / x      CARDIAC MARKERS ( 16 Mar 2021 15:59 )  x     / <0.01 ng/mL / x     / x     / x          RADIOLOGY:    PHYSICAL EXAM:  GENERAL: NAD, speaks in full sentences, no signs of respiratory distress  HEAD: Atraumatic  NECK: Supple  CHEST/LUNG: Clear to auscultation bilaterally; No wheeze or crackles  HEART: S1, S2; RRR; No murmurs, rubs, or gallops  ABDOMEN: BS+; Soft, Non-tender, Non-distended  EXTREMITIES:  2+ Peripheral Pulses, No clubbing, cyanosis, or edema  PSYCH: AAOx3  NEUROLOGY: non-focal  SKIN: No rashes or lesions, pleurX cath clean and in tact.     SUBJECTIVE:    Patient is a 40y old  Female who presents with a chief complaint of dizziness and fainting.    Currently admitted to medicine with the primary diagnosis of loculated pleural effusion secondary to advanced Hodgkin Lymphoma.     Today is hospital day 1. Patient was seen and examined at bedside. This morning she is resting comfortably in bed and reports no new issues or overnight events.       PAST MEDICAL & SURGICAL HISTORY  PAST MEDICAL & SURGICAL HISTORY:  Hodgkin lymphoma, Active      SOCIAL HISTORY:  Social History (marital status, living situation, occupation, tobacco use, alcohol and drug use, and sexual history): never smoked    ALLERGIES:  No Known Allergies    MEDICATIONS:  STANDING MEDICATIONS  ALPRAZolam 0.25 milliGRAM(s) Oral daily  chlorhexidine 4% Liquid 1 Application(s) Topical <User Schedule>  enoxaparin Injectable 40 milliGRAM(s) SubCutaneous daily  melatonin 5 milliGRAM(s) Oral at bedtime  pantoprazole    Tablet 40 milliGRAM(s) Oral before breakfast  senna 2 Tablet(s) Oral at bedtime  sertraline 25 milliGRAM(s) Oral two times a day  simethicone 80 milliGRAM(s) Chew every 6 hours    PRN MEDICATIONS  acetaminophen   Tablet .. 650 milliGRAM(s) Oral every 6 hours PRN  acetaminophen   Tablet .. 650 milliGRAM(s) Oral every 6 hours PRN  diphenhydrAMINE 25 milliGRAM(s) Oral every 4 hours PRN  ketorolac   Injectable 15 milliGRAM(s) IV Push three times a day PRN  morphine  - Injectable 4 milliGRAM(s) IV Push every 4 hours PRN  ondansetron Injectable 4 milliGRAM(s) IV Push every 8 hours PRN    VITALS:   T(F): 97.1  HR: 97  BP: 90/54  RR: 20  SpO2: 97%    LABS:                        10.0   12.04 )-----------( 416      ( 17 Mar 2021 06:33 )             32.5     03-17    135  |  98  |  11  ----------------------------<  92  4.4   |  27  |  0.6<L>    Ca    9.4      17 Mar 2021 06:33  Mg     1.8     03-17    TPro  5.7<L>  /  Alb  3.0<L>  /  TBili  0.2  /  DBili  x   /  AST  15  /  ALT  16  /  AlkPhos  79      PT/INR - ( 17 Mar 2021 06:33 )   PT: 15.80 sec;   INR: 1.37 ratio         PTT - ( 17 Mar 2021 06:33 )  PTT:35.1 sec  Urinalysis Basic - ( 17 Mar 2021 05:00 )    Color: Colorless / Appearance: Clear / S.021 / pH: x  Gluc: x / Ketone: Negative  / Bili: Negative / Urobili: <2 mg/dL   Blood: x / Protein: Negative / Nitrite: Negative   Leuk Esterase: Negative / RBC: x / WBC x   Sq Epi: x / Non Sq Epi: x / Bacteria: x        Lactate, Blood: 0.7 mmol/L (21 @ 06:33)  Troponin T, Serum: <0.01 ng/mL (21 @ 06:33)  Troponin T, Serum: <0.01 ng/mL (21 @ 15:59)  Lactate, Blood: 0.9 mmol/L (21 @ 15:59)      CARDIAC MARKERS ( 17 Mar 2021 06:33 )  x     / <0.01 ng/mL / x     / x     / x      CARDIAC MARKERS ( 16 Mar 2021 15:59 )  x     / <0.01 ng/mL / x     / x     / x          RADIOLOGY:  < from: CT Angio Chest PE Protocol w/ IV Cont (21 @ 17:48) >    IMPRESSION:    No pulmonary embolus or CT evidence of acute right heart strain.    Interval placement of a left pleural catheter with significant decrease in the left pleural effusion. Small residual hydropneumothorax is present with areas of loculation. Small-to-moderate size right pleural effusion without significant change.    Large anterior mediastinal mass extending into the supraclavicular space encasing the arch vessels and interspersed structures. Bilateral hilar, supraclavicular and axillary bulky lymphadenopathy. Findings consistent with known history of lymphoma.    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 Additionally, increased aeration left lung revealing innumerable left lung nodules. These can be infectious or metastatic in etiology.    Interlobular septal thickening, peribronchial and perivascular cuffing on the left likely secondary to lymphatic congestion from hilar lymphadenopathy.    Moderate pericardial effusion, increased since prior exam.    Left pectoralis muscle thickening and chest wall edema, decreased since prior exam.      < end of copied text >  < from: Xray Chest 1 View AP/PA (21 @ 16:33) >    Impression:    Unchanged appearance of the mediastinum.      < end of copied text >      PHYSICAL EXAM:  GENERAL: NAD, speaks in full sentences, no signs of respiratory distress  HEAD: Atraumatic  NECK: Supple  CHEST/LUNG: Clear to auscultation bilaterally; No wheeze or crackles  HEART: S1, S2; RRR; No murmurs, rubs, or gallops  ABDOMEN: BS+; Soft, Non-tender, Non-distended  EXTREMITIES:  2+ Peripheral Pulses, No clubbing, cyanosis, or edema  PSYCH: AAOx3  NEUROLOGY: non-focal  SKIN: No rashes or lesions, pleurX cath clean and in tact.

## 2021-03-17 NOTE — DISCHARGE NOTE NURSING/CASE MANAGEMENT/SOCIAL WORK - PATIENT PORTAL LINK FT
You can access the FollowMyHealth Patient Portal offered by [facility] by registering at the following website: [email or web address]. By joining [employer]’s FollowMyHealth portal, you will also be able to view your health information using other applications (apps) compatible with our system.

## 2021-03-17 NOTE — CONSULT NOTE ADULT - ASSESSMENT
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 Impression  Small right pleural effusion  Right sided atelectasis  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

## 2021-03-18 ENCOUNTER — TRANSCRIPTION ENCOUNTER (OUTPATIENT)
Age: 41
End: 2021-03-18

## 2021-03-18 VITALS
HEART RATE: 101 BPM | SYSTOLIC BLOOD PRESSURE: 92 MMHG | DIASTOLIC BLOOD PRESSURE: 54 MMHG | RESPIRATION RATE: 18 BRPM | TEMPERATURE: 99 F

## 2021-03-18 LAB
ALBUMIN SERPL ELPH-MCNC: 3.4 G/DL — LOW (ref 3.5–5.2)
ALP SERPL-CCNC: 90 U/L — SIGNIFICANT CHANGE UP (ref 30–115)
ALT FLD-CCNC: 22 U/L — SIGNIFICANT CHANGE UP (ref 0–41)
ANION GAP SERPL CALC-SCNC: 10 MMOL/L — SIGNIFICANT CHANGE UP (ref 7–14)
AST SERPL-CCNC: 19 U/L — SIGNIFICANT CHANGE UP (ref 0–41)
BASOPHILS # BLD AUTO: 0.03 K/UL — SIGNIFICANT CHANGE UP (ref 0–0.2)
BASOPHILS NFR BLD AUTO: 0.3 % — SIGNIFICANT CHANGE UP (ref 0–1)
BILIRUB SERPL-MCNC: <0.2 MG/DL — SIGNIFICANT CHANGE UP (ref 0.2–1.2)
BUN SERPL-MCNC: 18 MG/DL — SIGNIFICANT CHANGE UP (ref 10–20)
CALCIUM SERPL-MCNC: 9.3 MG/DL — SIGNIFICANT CHANGE UP (ref 8.5–10.1)
CHLORIDE SERPL-SCNC: 99 MMOL/L — SIGNIFICANT CHANGE UP (ref 98–110)
CO2 SERPL-SCNC: 28 MMOL/L — SIGNIFICANT CHANGE UP (ref 17–32)
CREAT SERPL-MCNC: 0.6 MG/DL — LOW (ref 0.7–1.5)
CULTURE RESULTS: NO GROWTH — SIGNIFICANT CHANGE UP
EOSINOPHIL # BLD AUTO: 0.13 K/UL — SIGNIFICANT CHANGE UP (ref 0–0.7)
EOSINOPHIL NFR BLD AUTO: 1.2 % — SIGNIFICANT CHANGE UP (ref 0–8)
GLUCOSE SERPL-MCNC: 96 MG/DL — SIGNIFICANT CHANGE UP (ref 70–99)
HCT VFR BLD CALC: 35.8 % — LOW (ref 37–47)
HGB BLD-MCNC: 10.8 G/DL — LOW (ref 12–16)
IMM GRANULOCYTES NFR BLD AUTO: 0.6 % — HIGH (ref 0.1–0.3)
LYMPHOCYTES # BLD AUTO: 1.16 K/UL — LOW (ref 1.2–3.4)
LYMPHOCYTES # BLD AUTO: 10.9 % — LOW (ref 20.5–51.1)
MAGNESIUM SERPL-MCNC: 2 MG/DL — SIGNIFICANT CHANGE UP (ref 1.8–2.4)
MCHC RBC-ENTMCNC: 24.2 PG — LOW (ref 27–31)
MCHC RBC-ENTMCNC: 30.2 G/DL — LOW (ref 32–37)
MCV RBC AUTO: 80.3 FL — LOW (ref 81–99)
MONOCYTES # BLD AUTO: 0.74 K/UL — HIGH (ref 0.1–0.6)
MONOCYTES NFR BLD AUTO: 7 % — SIGNIFICANT CHANGE UP (ref 1.7–9.3)
NEUTROPHILS # BLD AUTO: 8.49 K/UL — HIGH (ref 1.4–6.5)
NEUTROPHILS NFR BLD AUTO: 80 % — HIGH (ref 42.2–75.2)
NIGHT BLUE STAIN TISS: SIGNIFICANT CHANGE UP
NRBC # BLD: 0 /100 WBCS — SIGNIFICANT CHANGE UP (ref 0–0)
PLATELET # BLD AUTO: 452 K/UL — HIGH (ref 130–400)
POTASSIUM SERPL-MCNC: 4.8 MMOL/L — SIGNIFICANT CHANGE UP (ref 3.5–5)
POTASSIUM SERPL-SCNC: 4.8 MMOL/L — SIGNIFICANT CHANGE UP (ref 3.5–5)
PROCALCITONIN SERPL-MCNC: 0.07 NG/ML — SIGNIFICANT CHANGE UP (ref 0.02–0.1)
PROT SERPL-MCNC: 6.1 G/DL — SIGNIFICANT CHANGE UP (ref 6–8)
RBC # BLD: 4.46 M/UL — SIGNIFICANT CHANGE UP (ref 4.2–5.4)
RBC # FLD: 16.3 % — HIGH (ref 11.5–14.5)
SODIUM SERPL-SCNC: 137 MMOL/L — SIGNIFICANT CHANGE UP (ref 135–146)
SPECIMEN SOURCE: SIGNIFICANT CHANGE UP
SPECIMEN SOURCE: SIGNIFICANT CHANGE UP
TROPONIN T SERPL-MCNC: <0.01 NG/ML — SIGNIFICANT CHANGE UP
WBC # BLD: 10.61 K/UL — SIGNIFICANT CHANGE UP (ref 4.8–10.8)
WBC # FLD AUTO: 10.61 K/UL — SIGNIFICANT CHANGE UP (ref 4.8–10.8)

## 2021-03-18 PROCEDURE — 99221 1ST HOSP IP/OBS SF/LOW 40: CPT

## 2021-03-18 PROCEDURE — 99251: CPT

## 2021-03-18 PROCEDURE — 99232 SBSQ HOSP IP/OBS MODERATE 35: CPT

## 2021-03-18 PROCEDURE — 99233 SBSQ HOSP IP/OBS HIGH 50: CPT

## 2021-03-18 PROCEDURE — 93306 TTE W/DOPPLER COMPLETE: CPT | Mod: 26

## 2021-03-18 RX ORDER — MORPHINE SULFATE 50 MG/1
2 CAPSULE, EXTENDED RELEASE ORAL EVERY 6 HOURS
Refills: 0 | Status: DISCONTINUED | OUTPATIENT
Start: 2021-03-18 | End: 2021-03-18

## 2021-03-18 RX ADMIN — Medication 5 MILLIGRAM(S): at 21:33

## 2021-03-18 RX ADMIN — MORPHINE SULFATE 2 MILLIGRAM(S): 50 CAPSULE, EXTENDED RELEASE ORAL at 11:23

## 2021-03-18 RX ADMIN — ONDANSETRON 4 MILLIGRAM(S): 8 TABLET, FILM COATED ORAL at 11:33

## 2021-03-18 RX ADMIN — Medication 0.25 MILLIGRAM(S): at 11:24

## 2021-03-18 RX ADMIN — SERTRALINE 25 MILLIGRAM(S): 25 TABLET, FILM COATED ORAL at 05:30

## 2021-03-18 RX ADMIN — SERTRALINE 25 MILLIGRAM(S): 25 TABLET, FILM COATED ORAL at 17:17

## 2021-03-18 RX ADMIN — MORPHINE SULFATE 2 MILLIGRAM(S): 50 CAPSULE, EXTENDED RELEASE ORAL at 19:04

## 2021-03-18 RX ADMIN — SIMETHICONE 80 MILLIGRAM(S): 80 TABLET, CHEWABLE ORAL at 17:17

## 2021-03-18 RX ADMIN — SIMETHICONE 80 MILLIGRAM(S): 80 TABLET, CHEWABLE ORAL at 11:22

## 2021-03-18 RX ADMIN — SIMETHICONE 80 MILLIGRAM(S): 80 TABLET, CHEWABLE ORAL at 05:30

## 2021-03-18 RX ADMIN — Medication 15 MILLIGRAM(S): at 21:33

## 2021-03-18 RX ADMIN — MORPHINE SULFATE 2 MILLIGRAM(S): 50 CAPSULE, EXTENDED RELEASE ORAL at 05:30

## 2021-03-18 RX ADMIN — SENNA PLUS 2 TABLET(S): 8.6 TABLET ORAL at 21:33

## 2021-03-18 RX ADMIN — ENOXAPARIN SODIUM 40 MILLIGRAM(S): 100 INJECTION SUBCUTANEOUS at 11:23

## 2021-03-18 RX ADMIN — MORPHINE SULFATE 2 MILLIGRAM(S): 50 CAPSULE, EXTENDED RELEASE ORAL at 12:42

## 2021-03-18 RX ADMIN — Medication 15 MILLIGRAM(S): at 15:41

## 2021-03-18 RX ADMIN — Medication 25 MILLIGRAM(S): at 19:08

## 2021-03-18 RX ADMIN — Medication 25 MILLIGRAM(S): at 11:23

## 2021-03-18 RX ADMIN — Medication 25 MILLIGRAM(S): at 05:33

## 2021-03-18 RX ADMIN — Medication 15 MILLIGRAM(S): at 00:15

## 2021-03-18 RX ADMIN — Medication 15 MILLIGRAM(S): at 16:11

## 2021-03-18 RX ADMIN — Medication 250 MILLIGRAM(S): at 05:30

## 2021-03-18 RX ADMIN — Medication 250 MILLIGRAM(S): at 17:17

## 2021-03-18 RX ADMIN — PANTOPRAZOLE SODIUM 40 MILLIGRAM(S): 20 TABLET, DELAYED RELEASE ORAL at 05:30

## 2021-03-18 NOTE — DISCHARGE NOTE PROVIDER - CARE PROVIDER_API CALL
Keeley Bauman (NP; RN)  NP in [facility]  [address] [place] [address]  Phone: [phone]  Fax: [phone]  Follow Up Time:     Dr Theresa  oncologist  Phone: (   )    -  Fax: (   )    -  Follow Up Time:     Sebastian Fitch)  Critical Care Medicine; Pulmonary Disease; Sleep Medicine  [address] [place] [address]  Phone: [phone]  Fax: [phone]  Follow Up Time:    Keeley Bauman (NP; RN)  NP in [facility]  [address] [place] [address]  Phone: [phone]  Fax: [phone]  Follow Up Time:     Sebastian Fitch)  Critical Care Medicine; Pulmonary Disease; Sleep Medicine  [address] [place] [address]  [place] [address]  Phone: [phone]  Fax: [phone]  Follow Up Time:     Dr Theresa  oncologist  Phone: (   )    -  Fax: (   )    -  Scheduled Appointment: 03/19/2021    Manoj Lobo)  Surgery; Thoracic and Cardiac Surgery  [address] [place] [address], [address]  [place] [address]  Phone: [phone]  Fax: [phone]  Follow Up Time: 1 week   Keeley Bauman (NP; RN)  NP in [facility]  [address] [place] [address]  Phone: [phone]  Fax: [phone]  Follow Up Time:     Sebastian Fitch)  Critical Care Medicine; Pulmonary Disease; Sleep Medicine  [address] [place] [address]  [place] [address]  Phone: [phone]  Fax: [phone]  Follow Up Time:     Manoj Lobo)  Surgery; Thoracic and Cardiac Surgery  [address] [place] [address], [address]  [place] [address]  Phone: [phone]  Fax: [phone]  Follow Up Time: 1 week    Dr Theresa  oncologist  Phone: (   )    -  Fax: (   )    -  Scheduled Appointment: 03/19/2021    Timothy Santana)  Cardiovascular Disease; Internal Medicine  [address] [place] [address], [address]  [place] [address]  Phone: [phone]  Fax: [phone]  Follow Up Time: 1-3 days

## 2021-03-18 NOTE — PROGRESS NOTE ADULT - ASSESSMENT
ASSESSMENT  40 year old F patient with Hx of Hodgkin lymphoma ( Active, s/p one dose of ABVD ) who present with fevers and fainting episode    IMPRESSION  #Hodgekin Lymphoma s/p one dose of ABVD with intolerance  #Left pleural effusion s/p Pleurx catheter   #CT Angio Chest PE Protocol w/ IV Cont (03.16.21 @ 17:48): No pulmonary embolus or CT evidence of acute right heart strain. Interval placement of a left pleural catheter with significant decrease in the left pleural effusion. Small residual hydropneumothorax is present with areas of loculation. Small-to-moderate size right pleural effusion without significant change. Large anterior mediastinal mass extending into the supraclavicular space encasing the arch vessels and interspersed structures. Bilateral hilar, supraclavicular and axillary bulky lymphadenopathy. Findings consistent with known history of lymphoma. 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 Additionally, increased aeration left lung revealing innumerable left lung nodules. These can be infectious or metastatic in etiology. Interlobular septal thickening, peribronchial and perivascular cuffing on the left likely secondary to lymphatic congestion from hilar lymphadenopathy. Moderate pericardial effusion, increased since prior exam. Left pectoralis muscle thickening and chest wall edema, decreased since prior exam.    #Obesity BMI (kg/m2): 22.7, 24.6  #Abx allergy: NKDA    RECOMMENDATIONS  - follow-up pleural fluid cultures  - procalcitonin negative -- monitor off antibiotics    Please call or message on Microsoft Teams if with any questions.  Spectra [phone]

## 2021-03-18 NOTE — CHART NOTE - NSCHARTNOTEFT_GEN_A_CORE
TTE Echo Complete w/o Contrast w/ Doppler (03.18.21 @ 13:37) > "Large pericardial effusion. There is no definite evidence of cardiac tamponade, however there is inversion of the right atrial wall, which may represent early hemodynamic compromise. "    Echo reviewed as per above. Discussed with primary attending who would like to discharge patient as she is due to an outpatient immunotherapy treatment tomorrow scheduled by her oncologist for her dx of Lymphoma. Cardiothoracic surgery consulted who stated no acute intervention at this time and states she is cleared for discharge from their standpoint. Cardiology consulted, stated that patient should be transferred to telemetry if stays in the hospital and we cannot obtain cardiology clearance for discharge overnight as patient would need to be seen by cardiology attending in the morning. Case discussed with outpatient oncologist who would like patient to be discharged for her treatment tomorrow.     Due to her mild hypotension/tachycardia patient will need very close outpatient follow up with repeat echo within 1 week to monitor the pericardial effusion.  CT surgery stating that they cannot coordinate outpatient appointment/echo. Case discussed in detail with on call attending. On call physician to review case and discuss with patient regarding possible d/c this evening.     Overnight resident aware of the situation, detailed sign-out given. Pending confirmation from attending if patient can be safely discharged this evening. TTE Echo Complete w/o Contrast w/ Doppler (03.18.21 @ 13:37) > "Large pericardial effusion. There is no definite evidence of cardiac tamponade, however there is inversion of the right atrial wall, which may represent early hemodynamic compromise. "    Echo reviewed as per above. Discussed with primary attending who would like to discharge patient as she is due to an outpatient immunotherapy treatment tomorrow scheduled by her oncologist for her dx of Lymphoma. Cardiothoracic surgery consulted who stated no acute intervention at this time and states she is cleared for discharge from their standpoint. Cardiology consulted, stated that patient should be transferred to telemetry if stays in the hospital and we cannot obtain cardiology clearance for discharge overnight as patient would need to be seen by cardiology attending in the morning. Case discussed with outpatient oncologist who would like patient to be discharged for her treatment tomorrow.     Due to her mild hypotension/tachycardia patient will need very close outpatient follow up with repeat echo within 1 week to monitor the pericardial effusion.  CT surgery stating that they cannot coordinate outpatient appointment/echo. Case discussed in detail with on call attending. On call physician to review case and discuss with patient regarding possible d/c this evening.     Overnight resident aware of the situation, detailed sign-out given. Pending confirmation from attending if patient can be safely discharged this evening.      Addendum:  Patient seen and evaluated.   Case discussed with her and with her NOK (at her request) at length.   Patient has rapidly progressive Hodgkin's lymphoma.  Prognosis is poor if it remains untreated.  At the time of my eval, the pt was asymptomatic. Orthostatic negative.  I discussed the case w/ CT-Sx at length.   Per CT-Sx, there is no indication for urgent pericardiocentesis.  They recommend that the patient receive her immunotherapy.  They recommend an ECHO in 1 wk & subsequent f/u with the CT-Surgeon.  Will refer the patient to Cardiology for said TTE.  I discussed the above with the patient at length.  I stressed the importance of close out-pt monitoring.   She agreed with the plan as aforementioned.   Asking to be discharged at this time.  Will d/c.

## 2021-03-18 NOTE — DISCHARGE NOTE PROVIDER - NPI NUMBER (FOR SYSADMIN USE ONLY) :
[[id number]],[UNKNOWN],[[id number]] [[id number]],[[id number]],[UNKNOWN],[[id number]] [[id number]],[[id number]],[[id number]],[UNKNOWN],[[id number]]

## 2021-03-18 NOTE — PROGRESS NOTE ADULT - SUBJECTIVE AND OBJECTIVE BOX
SUBJECTIVE:    Patient is a 40y old  Female who presents with a chief complaint of dizziness and fainting.    Currently admitted to medicine with the primary diagnosis of loculated pleural effusion secondary to advanced Hodgkin Lymphoma.     Today is hospital day 1. Patient was seen and examined at bedside. This morning she is resting comfortably in bed and reports no new issues or overnight events. Patient had a well rested night.      PAST MEDICAL & SURGICAL HISTORY  PAST MEDICAL & SURGICAL HISTORY:  Hodgkin lymphoma, Active      SOCIAL HISTORY:  Social History (marital status, living situation, occupation, tobacco use, alcohol and drug use, and sexual history): never smoked    ALLERGIES:  No Known Allergies    MEDICATIONS:  MEDICATIONS  (STANDING):  ALPRAZolam 0.25 milliGRAM(s) Oral daily  chlorhexidine 4% Liquid 1 Application(s) Topical <User Schedule>  enoxaparin Injectable 40 milliGRAM(s) SubCutaneous daily  melatonin 5 milliGRAM(s) Oral at bedtime  pantoprazole    Tablet 40 milliGRAM(s) Oral before breakfast  saccharomyces boulardii 250 milliGRAM(s) Oral two times a day  senna 2 Tablet(s) Oral at bedtime  sertraline 25 milliGRAM(s) Oral two times a day  simethicone 80 milliGRAM(s) Chew every 6 hours    MEDICATIONS  (PRN):  acetaminophen   Tablet .. 650 milliGRAM(s) Oral every 6 hours PRN Mild Pain (1 - 3)  acetaminophen   Tablet .. 650 milliGRAM(s) Oral every 6 hours PRN Temp greater or equal to 38C (100.4F)  diphenhydrAMINE 25 milliGRAM(s) Oral every 4 hours PRN Rash and/or Itching  ketorolac   Injectable 15 milliGRAM(s) IV Push three times a day PRN Moderate Pain (4 - 6)  morphine  - Injectable 2 milliGRAM(s) IV Push every 6 hours PRN Severe Pain (7 - 10)  ondansetron Injectable 4 milliGRAM(s) IV Push every 8 hours PRN Nausea and/or Vomiting    VITALS:   Vital Signs Last 24 Hrs  T(C): 37 (18 Mar 2021 08:08), Max: 37.3 (18 Mar 2021 00:00)  T(F): 98.6 (18 Mar 2021 08:08), Max: 99.1 (18 Mar 2021 00:00)  HR: 92 (18 Mar 2021 08:08) (92 - 98)  BP: 89/51 (18 Mar 2021 08:08) (89/51 - 98/53)  BP(mean): --  RR: 18 (18 Mar 2021 08:08) (18 - 20)  SpO2: --    LABS:      137  |  99  |  18  ----------------------------<  96  4.8   |  28  |  0.6<L>    Ca    9.3      18 Mar 2021 05:15  Mg     2.0         TPro  6.1  /  Alb  3.4<L>  /  TBili  <0.2  /  DBili  x   /  AST  19  /  ALT  22  /  AlkPhos  90      PT/INR - ( 17 Mar 2021 06:33 )   PT: 15.80 sec;   INR: 1.37 ratio       PTT - ( 17 Mar 2021 06:33 )  PTT:35.1 sec    Urinalysis Basic - ( 17 Mar 2021 05:00 )    Color: Colorless / Appearance: Clear / S.021 / pH: x  Gluc: x / Ketone: Negative  / Bili: Negative / Urobili: <2 mg/dL   Blood: x / Protein: Negative / Nitrite: Negative   Leuk Esterase: Negative / RBC: x / WBC x   Sq Epi: x / Non Sq Epi: x / Bacteria: x        Lactate, Blood: 0.7 mmol/L (21 @ 06:33)  Troponin T, Serum: <0.01 ng/mL (21 @ 06:33)  Troponin T, Serum: <0.01 ng/mL (21 @ 15:59)  Lactate, Blood: 0.9 mmol/L (21 @ 15:59)      CARDIAC MARKERS ( 17 Mar 2021 06:33 )  x     / <0.01 ng/mL / x     / x     / x      CARDIAC MARKERS ( 16 Mar 2021 15:59 )  x     / <0.01 ng/mL / x     / x     / x          RADIOLOGY:  < from: CT Angio Chest PE Protocol w/ IV Cont (21 @ 17:48) >    IMPRESSION:    No pulmonary embolus or CT evidence of acute right heart strain.    Interval placement of a left pleural catheter with significant decrease in the left pleural effusion. Small residual hydropneumothorax is present with areas of loculation. Small-to-moderate size right pleural effusion without significant change.    Large anterior mediastinal mass extending into the supraclavicular space encasing the arch vessels and interspersed structures. Bilateral hilar, supraclavicular and axillary bulky lymphadenopathy. Findings consistent with known history of lymphoma.    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 Additionally, increased aeration left lung revealing innumerable left lung nodules. These can be infectious or metastatic in etiology.    Interlobular septal thickening, peribronchial and perivascular cuffing on the left likely secondary to lymphatic congestion from hilar lymphadenopathy.    Moderate pericardial effusion, increased since prior exam.    Left pectoralis muscle thickening and chest wall edema, decreased since prior exam.      < end of copied text >  < from: Xray Chest 1 View AP/PA (21 @ 16:33) >    Impression:    Unchanged appearance of the mediastinum.      < end of copied text >      PHYSICAL EXAM:  GENERAL: NAD, speaks in full sentences, no signs of respiratory distress  HEAD: Atraumatic  NECK: Supple  CHEST/LUNG: Clear to auscultation bilaterally; No wheeze or crackles  HEART: S1, S2; RRR; No murmurs, rubs, or gallops  ABDOMEN: BS+; Soft, Non-tender, Non-distended  EXTREMITIES:  2+ Peripheral Pulses, No clubbing, cyanosis, or edema  PSYCH: AAOx3  NEUROLOGY: non-focal  SKIN: No rashes or lesions, pleurX cath clean and in tact.     SUBJECTIVE:    Patient is a 40y old  Female who presents with a chief complaint of dizziness and fainting.    Currently admitted to medicine with the primary diagnosis of loculated pleural effusion secondary to advanced Hodgkin Lymphoma.     Today is hospital day 2. Patient was seen and examined at bedside. This morning she is resting comfortably in bed and reports no new issues or overnight events. Patient had a well rested night.      PAST MEDICAL & SURGICAL HISTORY  PAST MEDICAL & SURGICAL HISTORY:  Hodgkin lymphoma, Active      SOCIAL HISTORY:  Social History (marital status, living situation, occupation, tobacco use, alcohol and drug use, and sexual history): never smoked    ALLERGIES:  No Known Allergies    MEDICATIONS:  MEDICATIONS  (STANDING):  ALPRAZolam 0.25 milliGRAM(s) Oral daily  chlorhexidine 4% Liquid 1 Application(s) Topical <User Schedule>  enoxaparin Injectable 40 milliGRAM(s) SubCutaneous daily  melatonin 5 milliGRAM(s) Oral at bedtime  pantoprazole    Tablet 40 milliGRAM(s) Oral before breakfast  saccharomyces boulardii 250 milliGRAM(s) Oral two times a day  senna 2 Tablet(s) Oral at bedtime  sertraline 25 milliGRAM(s) Oral two times a day  simethicone 80 milliGRAM(s) Chew every 6 hours    MEDICATIONS  (PRN):  acetaminophen   Tablet .. 650 milliGRAM(s) Oral every 6 hours PRN Mild Pain (1 - 3)  acetaminophen   Tablet .. 650 milliGRAM(s) Oral every 6 hours PRN Temp greater or equal to 38C (100.4F)  diphenhydrAMINE 25 milliGRAM(s) Oral every 4 hours PRN Rash and/or Itching  ketorolac   Injectable 15 milliGRAM(s) IV Push three times a day PRN Moderate Pain (4 - 6)  morphine  - Injectable 2 milliGRAM(s) IV Push every 6 hours PRN Severe Pain (7 - 10)  ondansetron Injectable 4 milliGRAM(s) IV Push every 8 hours PRN Nausea and/or Vomiting    VITALS:   Vital Signs Last 24 Hrs  T(C): 37 (18 Mar 2021 08:08), Max: 37.3 (18 Mar 2021 00:00)  T(F): 98.6 (18 Mar 2021 08:08), Max: 99.1 (18 Mar 2021 00:00)  HR: 92 (18 Mar 2021 08:08) (92 - 98)  BP: 89/51 (18 Mar 2021 08:08) (89/51 - 98/53)  BP(mean): --  RR: 18 (18 Mar 2021 08:08) (18 - 20)  SpO2: --    LABS:      137  |  99  |  18  ----------------------------<  96  4.8   |  28  |  0.6<L>    Ca    9.3      18 Mar 2021 05:15  Mg     2.0         TPro  6.1  /  Alb  3.4<L>  /  TBili  <0.2  /  DBili  x   /  AST  19  /  ALT  22  /  AlkPhos  90      PT/INR - ( 17 Mar 2021 06:33 )   PT: 15.80 sec;   INR: 1.37 ratio       PTT - ( 17 Mar 2021 06:33 )  PTT:35.1 sec    Urinalysis Basic - ( 17 Mar 2021 05:00 )    Color: Colorless / Appearance: Clear / S.021 / pH: x  Gluc: x / Ketone: Negative  / Bili: Negative / Urobili: <2 mg/dL   Blood: x / Protein: Negative / Nitrite: Negative   Leuk Esterase: Negative / RBC: x / WBC x   Sq Epi: x / Non Sq Epi: x / Bacteria: x        Lactate, Blood: 0.7 mmol/L (21 @ 06:33)  Troponin T, Serum: <0.01 ng/mL (21 @ 06:33)  Troponin T, Serum: <0.01 ng/mL (21 @ 15:59)  Lactate, Blood: 0.9 mmol/L (21 @ 15:59)      CARDIAC MARKERS ( 17 Mar 2021 06:33 )  x     / <0.01 ng/mL / x     / x     / x      CARDIAC MARKERS ( 16 Mar 2021 15:59 )  x     / <0.01 ng/mL / x     / x     / x          RADIOLOGY:  < from: CT Angio Chest PE Protocol w/ IV Cont (21 @ 17:48) >    IMPRESSION:    No pulmonary embolus or CT evidence of acute right heart strain.    Interval placement of a left pleural catheter with significant decrease in the left pleural effusion. Small residual hydropneumothorax is present with areas of loculation. Small-to-moderate size right pleural effusion without significant change.    Large anterior mediastinal mass extending into the supraclavicular space encasing the arch vessels and interspersed structures. Bilateral hilar, supraclavicular and axillary bulky lymphadenopathy. Findings consistent with known history of lymphoma.    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 Additionally, increased aeration left lung revealing innumerable left lung nodules. These can be infectious or metastatic in etiology.    Interlobular septal thickening, peribronchial and perivascular cuffing on the left likely secondary to lymphatic congestion from hilar lymphadenopathy.    Moderate pericardial effusion, increased since prior exam.    Left pectoralis muscle thickening and chest wall edema, decreased since prior exam.      < end of copied text >  < from: Xray Chest 1 View AP/PA (21 @ 16:33) >    Impression:    Unchanged appearance of the mediastinum.      < end of copied text >      PHYSICAL EXAM:  GENERAL: NAD, speaks in full sentences, no signs of respiratory distress  HEAD: Atraumatic  NECK: Supple  CHEST/LUNG: Clear to auscultation bilaterally; No wheeze or crackles  HEART: S1, S2; RRR; No murmurs, rubs, or gallops  ABDOMEN: BS+; Soft, Non-tender, Non-distended  EXTREMITIES:  2+ Peripheral Pulses, No clubbing, cyanosis, or edema  PSYCH: AAOx3  NEUROLOGY: non-focal  SKIN: No rashes or lesions, pleurX cath clean and in tact.

## 2021-03-18 NOTE — CONSULT NOTE ADULT - ATTENDING COMMENTS
I, Nestor Lopez saw, examined and reviewed the diagnostic images on patient Leisa Brunson on 03/18/2021 and agreed with Dr. José's clinical note, physical exam findings and treatment plan.  Ms. Brunson is a 40 year-old female with diagnosis of NHL, extensive mediastinal and lung involvement, recently underwent placement of left pleurx drain for large pleural effusion.  Patient admitted after episode of hypotension during drainage of pleurx drain.  CT chest revealed small-moderate right pleural effusion, almost resolved left pleural effusion with pleurx drain in place and pericardial effusion.  Echocardiogram reported large pericardial effusion without evidence of tamponade.  At physical exam patient is asymptomatic, hemodynamically stable, denies shortness of breath, tolerating lying supine, no orthostatic.  I had a long conversation with the patient and her oncologist.  With a very aggressive lymphoma, priority at this time is start treatment which patient is scheduled to start tomorrow as outpatient.  Given patient is asymptomatic and no signs of tamponade I agreed with patient to be discharge, start treatment and short term follow up with echocardiogram.  Patient, oncologist (Dr. Childress) and medicine service agreed with plan.  We will follow in 5-7 days with echocardiogram.
Seen and examined with the pulmonary fellow at the bed side.  Impression and plan as outlined above.

## 2021-03-18 NOTE — DISCHARGE NOTE PROVIDER - NSDCCPCAREPLAN_GEN_ALL_CORE_FT
PRINCIPAL DISCHARGE DIAGNOSIS  Diagnosis: Hodgkins lymphoma  Assessment and Plan of Treatment: - follow up with oncologist Dr Pham on 3/19  - take all medications as prescribed  - follow with Dr Abernathy in regards to the pleurex drain.   - follow up with primary care doctor within 1-2 weeks of discharge  - return to ER if symptoms persist or worsen.      SECONDARY DISCHARGE DIAGNOSES  Diagnosis: Chronic back pain  Assessment and Plan of Treatment: - follow up with Dr Pham on 3/19 and primary care doctor for further management.     PRINCIPAL DISCHARGE DIAGNOSIS  Diagnosis: Hodgkins lymphoma  Assessment and Plan of Treatment: - follow up with oncologist Dr Pham on 3/19  - take all medications as prescribed  - follow with Dr Abernathy in regards to the pleurex drain.   - follow up with primary care doctor within 1-2 weeks of discharge  - return to ER if symptoms persist or worsen.      SECONDARY DISCHARGE DIAGNOSES  Diagnosis: Pericardial effusion  Assessment and Plan of Treatment: You have large accumulation of fluid around your heart, you will have to repeat ECHO in 7-10 days. Follow up with PMD and oncologist. If you have worsening of symptoms (SOB, chest pain, syncope) please come back to ED you might need this accumulation to be drained. avoid dehydration.    Diagnosis: Chronic back pain  Assessment and Plan of Treatment: - follow up with Dr Pham on 3/19 and primary care doctor for further management.

## 2021-03-18 NOTE — DISCHARGE NOTE PROVIDER - CARE PROVIDERS DIRECT ADDRESSES
,[email or web address],[email or web address],DirectAddress_Unknown ,[email or web address],DirectAddress_[email or web address]_Unknown,[email or web address] ,[email or web address],DirectAddress_Unknown,[email or web address],DirectAddress_Unknown,[email or web address]

## 2021-03-18 NOTE — PROGRESS NOTE ADULT - SUBJECTIVE AND OBJECTIVE BOX
CLARA LOREZNANA    Patient is a 40y old  Female who presents with a chief complaint of Syncopal episode (18 Mar 2021 09:29)    INTERVAL HPI/OVERNIGHT EVENTS: no events overnight, pt states her pain is mostly controlled. Denies chest pain, no SOB. BP persistently low (even on prior admissions).    ROS: All ROS negative except as documented above     PHYSICAL EXAM:  T(C): 37.1, Max: 37.3 (-18-21 @ 00:00)  HR: 101 (92 - 101)  BP: 92/54 (89/51 - 98/53)  RR: 18 (18 - 20)  SpO2: --    GENERAL: NAD  PULMONARY/CHEST: No rales, rhonchi, wheezing  CARDIOVASC: Regular rate and rhythm; No murmurs  GI/ABDOMEN: Soft, Nontender, Nondistended; Bowel sounds present  EXTREMITIES:  2+ Peripheral Pulses, No clubbing, cyanosis, or edema, no deformity. No calf tenderness b/l.  NERVOUS SYSTEM:  Alert & Oriented X3, no focal deficit     LABS:                        10.8   10.61 )-----------( 452      ( 18 Mar 2021 05:15 )             35.8     03-18    137  |  99  |  18  ----------------------------<  96  4.8   |  28  |  0.6<L>    Ca    9.3      18 Mar 2021 05:15  Mg     2.0     03-18    TPro  6.1  /  Alb  3.4<L>  /  TBili  <0.2  /  DBili  x   /  AST  19  /  ALT  22  /  AlkPhos  90  03-18    PT/INR - ( 17 Mar 2021 06:33 )   PT: 15.80 sec;   INR: 1.37 ratio    PTT - ( 17 Mar 2021 06:33 )  PTT:35.1 sec      Urinalysis Basic - ( 17 Mar 2021 05:00 )    Color: Colorless / Appearance: Clear / S.021 / pH: x  Gluc: x / Ketone: Negative  / Bili: Negative / Urobili: <2 mg/dL   Blood: x / Protein: Negative / Nitrite: Negative   Leuk Esterase: Negative / RBC: x / WBC x   Sq Epi: x / Non Sq Epi: x / Bacteria: x        Culture - Acid Fast - Body Fluid w/Smear (collected 17 Mar 2021 18:00)  Source: .Body Fluid Pleural Fluid    Culture - Urine (collected 17 Mar 2021 05:00)  Source: .Urine Clean Catch (Midstream)  Final Report (18 Mar 2021 11:24):    No growth    Culture - Blood (collected 16 Mar 2021 15:59)  Source: .Blood Blood  Preliminary Report (18 Mar 2021 01:02):    No growth to date.    Culture - Blood (collected 16 Mar 2021 15:59)  Source: .Blood Blood  Preliminary Report (18 Mar 2021 01:02):    No growth to date.      RADIOLOGY & ADDITIONAL TESTS:  < from: TTE Echo Complete w/o Contrast w/ Doppler (21 @ 13:37) >  Summary:   1. LV Ejection Fraction by Tobar's Method with a biplane EF of 59 %.   2. Large pericardial effusion. There is no definite evidence of cardiac tamponade, however there is inversion of the right atrial wall, which may represent early hemodynamic compromise.   3. No evidence of mitral valve regurgitation.   4. Normal left atrial size.   5. Normal right atrial size.   6. Mild tricuspid regurgitation.    < end of copied text >        MEDICATIONS  (STANDING):  ALPRAZolam 0.25 milliGRAM(s) Oral daily  chlorhexidine 4% Liquid 1 Application(s) Topical <User Schedule>  enoxaparin Injectable 40 milliGRAM(s) SubCutaneous daily  melatonin 5 milliGRAM(s) Oral at bedtime  pantoprazole    Tablet 40 milliGRAM(s) Oral before breakfast  saccharomyces boulardii 250 milliGRAM(s) Oral two times a day  senna 2 Tablet(s) Oral at bedtime  sertraline 25 milliGRAM(s) Oral two times a day  simethicone 80 milliGRAM(s) Chew every 6 hours    MEDICATIONS  (PRN):  acetaminophen   Tablet .. 650 milliGRAM(s) Oral every 6 hours PRN Mild Pain (1 - 3)  acetaminophen   Tablet .. 650 milliGRAM(s) Oral every 6 hours PRN Temp greater or equal to 38C (100.4F)  diphenhydrAMINE 25 milliGRAM(s) Oral every 4 hours PRN Rash and/or Itching  ketorolac   Injectable 15 milliGRAM(s) IV Push three times a day PRN Moderate Pain (4 - 6)  morphine  - Injectable 2 milliGRAM(s) IV Push every 6 hours PRN Severe Pain (7 - 10)  ondansetron Injectable 4 milliGRAM(s) IV Push every 8 hours PRN Nausea and/or Vomiting

## 2021-03-18 NOTE — DISCHARGE NOTE PROVIDER - NSDCFUSCHEDAPPT_GEN_ALL_CORE_FT
CLARA LORENZANA ; 03/19/2021 ; [facility] [address] ; 04/23/2021 ; [facility] [address] CLARA LORENZANA ; 04/23/2021 ; [facility] [address]

## 2021-03-18 NOTE — CONSULT NOTE ADULT - ASSESSMENT
46F with lymphoma now causing large pericardial effusion. Patient is supposed to start chemotherapy tomorrow. Effusion is large on echo however the patient has no hemodynamic instability or SOB. She is feeling much better since the pleur-X catheter was placed and there is no emergent indication to drain the effusion instead of her starting the chemotherapy. She will require short term follow up for repeat echo next week to make sure the effusion does no get bigger.     Plan:   No urgent indication for pericardial drainage at this time  Patient is cleared for discharge from a surgical standpoint  She will need a repeat echo early next week

## 2021-03-18 NOTE — PROGRESS NOTE ADULT - SUBJECTIVE AND OBJECTIVE BOX
CLARA LORENZANA  40y, Female  Allergy: No Known Allergies      LOS  2d    CHIEF COMPLAINT: Syncopal episode (18 Mar 2021 09:29)      INTERVAL EVENTS/HPI  - No acute events overnight  - T(F): , Max: 99.1 (21 @ 00:00)  - Denies any worsening symptoms  - Tolerating medication  - WBC Count: 10.61 (21 @ 05:15)  WBC Count: 12.04 (21 @ 06:33)     - Creatinine, Serum: 0.6 (21 @ 05:15)  Creatinine, Serum: 0.6 (21 @ 06:33)       ROS  General: Denies rigors, nightsweats  HEENT: Denies headache, rhinorrhea, sore throat, eye pain  CV: Denies CP, palpitations  PULM: Denies wheezing, hemoptysis  GI: Denies hematemesis, hematochezia, melena  : Denies discharge, hematuria  MSK: Denies arthralgias, myalgias  SKIN: Denies rash, lesions  NEURO: Denies paresthesias, weakness  PSYCH: Denies depression, anxiety    VITALS:  T(F): 98.8, Max: 99.1 (21 @ 00:00)  HR: 101  BP: 92/54  RR: 18Vital Signs Last 24 Hrs  T(C): 37.1 (18 Mar 2021 16:17), Max: 37.3 (18 Mar 2021 00:00)  T(F): 98.8 (18 Mar 2021 16:17), Max: 99.1 (18 Mar 2021 00:00)  HR: 101 (18 Mar 2021 16:17) (92 - 101)  BP: 92/54 (18 Mar 2021 16:17) (89/51 - 98/53)  BP(mean): --  RR: 18 (18 Mar 2021 16:17) (18 - 20)  SpO2: --    PHYSICAL EXAM:  Gen: NAD, resting in bed  HEENT: Normocephalic, atraumatic  Neck: supple, no lymphadenopathy  CV: Regular rate & regular rhythm  Lungs: decreased BS at bases, no fremitus  Abdomen: Soft, BS present  Ext: Warm, well perfused  Neuro: non focal, awake  Skin: no rash, no erythema  Lines: no phlebitis    FH: Non-contributory  Social Hx: Non-contributory    TESTS & MEASUREMENTS:                        10.8   10.61 )-----------( 452      ( 18 Mar 2021 05:15 )             35.8         137  |  99  |  18  ----------------------------<  96  4.8   |  28  |  0.6<L>    Ca    9.3      18 Mar 2021 05:15  Mg     2.0         TPro  6.1  /  Alb  3.4<L>  /  TBili  <0.2  /  DBili  x   /  AST  19  /  ALT  22  /  AlkPhos  90      eGFR if Non African American: 112 mL/min/1.73M2 (21 @ 05:15)  eGFR if African American: 130 mL/min/1.73M2 (21 @ 05:15)    LIVER FUNCTIONS - ( 18 Mar 2021 05:15 )  Alb: 3.4 g/dL / Pro: 6.1 g/dL / ALK PHOS: 90 U/L / ALT: 22 U/L / AST: 19 U/L / GGT: x           Urinalysis Basic - ( 17 Mar 2021 05:00 )    Color: Colorless / Appearance: Clear / S.021 / pH: x  Gluc: x / Ketone: Negative  / Bili: Negative / Urobili: <2 mg/dL   Blood: x / Protein: Negative / Nitrite: Negative   Leuk Esterase: Negative / RBC: x / WBC x   Sq Epi: x / Non Sq Epi: x / Bacteria: x        Culture - Acid Fast - Body Fluid w/Smear (collected 21 @ 18:00)  Source: .Body Fluid Pleural Fluid    Culture - Urine (collected 21 @ 05:00)  Source: .Urine Clean Catch (Midstream)  Final Report (21 @ 11:24):    No growth    Culture - Blood (collected 21 @ 15:59)  Source: .Blood Blood  Preliminary Report (21 @ 01:02):    No growth to date.    Culture - Blood (collected 21 @ 15:59)  Source: .Blood Blood  Preliminary Report (21 @ 01:02):    No growth to date.    Culture - Fungal, Body Fluid (collected 21 @ 08:55)  Source: .Body Fluid Pleural Fluid  Preliminary Report (21 @ 15:03):    No growth    Culture - Body Fluid with Gram Stain (collected 21 @ 08:55)  Source: .Body Fluid Pleural Fluid  Gram Stain (21 @ 20:44):    polymorphonuclear leukocytes seen    No organisms seen    by cytocentrifuge  Final Report (03-10-21 @ 17:20):    No growth at 5 days    Culture - Fungal, Body Fluid (collected 21 @ 09:20)  Source: .Body Fluid Pleural Fluid  Preliminary Report (03-10-21 @ 15:02):    No growth    Culture - Body Fluid with Gram Stain (collected 21 @ 09:20)  Source: .Body Fluid Pleural Fluid  Gram Stain (21 @ 22:50):    polymorphonuclear leukocytes seen    No organisms seen    by cytocentrifuge  Final Report (21 @ 17:03):    No growth at 5 days    Culture - Fungal, Body Fluid (collected 21 @ 02:43)  Source: .Body Fluid Pleural Fluid  Preliminary Report (21 @ 15:02):    No growth    Culture - Body Fluid with Gram Stain (collected 21 @ 02:43)  Source: .Body Fluid Pleural Fluid  Gram Stain (21 @ 13:31):    polymorphonuclear leukocytes seen per low power field    No organisms seen per oil power field    by cytocentrifuge  Final Report (21 @ 10:44):    No growth at 5 days        Lactate, Blood: 0.7 mmol/L (21 @ 06:33)  Lactate, Blood: 0.9 mmol/L (21 @ 15:59)      INFECTIOUS DISEASES TESTING  Procalcitonin, Serum: 0.07 (21 @ 10:35)  Rapid RVP Result: NotDetec (21 @ 14:52)  COVID-19 PCR: NotDetec (21 @ 16:39)  Rapid RVP Result: NotDetec (21 @ 02:09)      INFLAMMATORY MARKERS      RADIOLOGY & ADDITIONAL TESTS:  I have personally reviewed the last available Chest xray  CXR  Xray Chest 1 View AP/PA:   EXAM:  XR CHEST 1 VIEW            PROCEDURE DATE:  2021            INTERPRETATION:  Clinical History / Reason for exam: Shortness of breath.    Comparison : Chest radiograph 4 days prior.    Technique/Positioning: Frontal portable.    Findings:    Support devices: None.    Cardiac/mediastinum/hilum: Enlarged mediastinum without change.    Lung parenchyma/Pleura: Retrocardiac opacity    Skeleton/soft tissues: Unchanged    Impression:    Unchanged appearance of the mediastinum.                  PAU MAN MD; Attending Interventional Radiologist  This document has been electronically signed. Mar 16 2021  4:42PM (21 @ 16:33)      CT  CT Abdomen and Pelvis w/ IV Cont:   EXAM:  CT ABDOMEN AND PELVIS IC        EXAM:  CT ANGIO CHEST PE PROTOCOL IC            PROCEDURE DATE:  2021            INTERPRETATION:  CLINICAL STATEMENT: Abdominal pain. Fever.    TECHNIQUE: Multislice helical sections were obtained from the thoracic inlet to the lung bases during rapid administration of 100cc Isovue-370 intravenous contrast using a CTA protocol. Subsequently, axial CT sections were obtained from the domes of the diaphragms to the pubic symphysis. Thin sections were reconstructed through the pulmonary vasculature. Sagittal and coronal reformatted images were acquired, as well as MIP reconstructed images.    COMPARISON: CT  Chest 2021.    FINDINGS:    CHEST:    PULMONARY EMBOLUS: No pulmonary embolus.    LUNGS/PLEURA/AIRWAYS: Central airways are patent. Interval placement of a left basal pleural catheter with interval significant decrease in left pleural effusion. Small residual hydropneumothorax with areas of loculation most pronounced along the bases. Interval reexpansion of the left lung revealing innumerable left lung nodules. There is diffuse peribronchial and perivascular cuffing. Interlobular septal thickening is present. The fissure demonstrate nodular thickening. Small to moderate-sized right pleural effusion is without significant interval change with adjacent right lower lobe subsegmental atelectasis.    MEDIASTINUM/THORACIC NODES: Large anterior mediastinal mass extending into the supraclavicular space and encasing the aortic arch and major arch vessels. There is bilateral hilar lymphadenopathy encasing hilar airways and vessels. Bilateral axillary lymphadenopathy and supraclavicular lymphadenopathy is also present.    HEART/GREAT VESSELS: Moderate pericardial effusion appears slightly increases the prior exam. No CT evidence of acute right heart strain.      ABDOMEN/PELVIS:    HEPATOBILIARY: Unremarkable.    SPLEEN: Unremarkable.    PANCREAS: Unremarkable.    ADRENAL GLANDS: Unremarkable.    KIDNEYS: Symmetric enhancement. No hydronephrosis.    ABDOMINOPELVIC NODES: Multiple nonspecific subcentimeter to mildly enlarged mesenteric lymph nodes.    PELVIC ORGANS: Unremarkable.    PERITONEUM/MESENTERY/BOWEL: No bowel obstruction, pneumoperitoneum, pneumatosis or ascites..    BONES/SOFT TISSUES: No aggressive osseous lesion. Left pectoralis muscle thickening and chest wall edema, decreased since prior exam.    OTHER: Normal caliber aorta.      IMPRESSION:    No pulmonary embolus or CT evidence of acute right heart strain.    Interval placement of a left pleural catheter with significant decrease in the left pleural effusion. Small residual hydropneumothorax is present with areas of loculation. Small-to-moderate size right pleural effusion without significant change.    Large anterior mediastinal mass extending into the supraclavicular space encasing the arch vessels and interspersed structures. Bilateral hilar, supraclavicular and axillary bulky lymphadenopathy. Findings consistent with known history of lymphoma.    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 Additionally, increased aeration left lung revealing innumerable left lung nodules. These can be infectious or metastatic in etiology.    Interlobular septal thickening, peribronchial and perivascular cuffing on the left likely secondary to lymphatic congestion from hilar lymphadenopathy.    Moderate pericardial effusion, increased since prior exam.    Left pectoralis muscle thickening and chest wall edema, decreased since prior exam.    Spoke with MICK BOSCH MD on 3/16/2021 6:35 PM with readback.              STEVEN IGNACIO MD; Attending Radiologist  This document has been electronically signed. Mar 16 2021  6:37PM (21 @ 17:48)      CARDIOLOGY TESTING  12 Lead ECG:   Ventricular Rate 86 BPM    Atrial Rate 86 BPM    P-R Interval 126 ms    QRS Duration 74 ms    Q-T Interval 350 ms    QTC Calculation(Bazett) 418 ms    P Axis 62 degrees    R Axis 83 degrees    T Axis 72 degrees    Diagnosis Line Normal sinus rhythm  Low voltage QRS  Poor R wave progression  Abnormal ECG    Confirmed by Medhat Marquez ([id number]) on 3/17/2021 8:37:22 AM (21 @ 07:55)  12 Lead ECG:   Ventricular Rate 127 BPM    Atrial Rate 127 BPM    P-R Interval 124 ms    QRS Duration 74 ms    Q-T Interval 288 ms    QTC Calculation(Bazett) 418 ms    P Axis 54 degrees    R Axis 71 degrees    T Axis 60 degrees    Diagnosis Line Sinus tachycardia  Low voltage QRS  Possible Lateral infarct , age undetermined  Abnormal ECG    Confirmed by DENZEL MANCILLA MD ([id number]) on 3/16/2021 3:43:52 PM (21 @ 13:49)      MEDICATIONS  ALPRAZolam 0.25 Oral daily  chlorhexidine 4% Liquid 1 Topical <User Schedule>  enoxaparin Injectable 40 SubCutaneous daily  melatonin 5 Oral at bedtime  pantoprazole    Tablet 40 Oral before breakfast  saccharomyces boulardii 250 Oral two times a day  senna 2 Oral at bedtime  sertraline 25 Oral two times a day  simethicone 80 Chew every 6 hours      WEIGHT  Weight (kg): 54.4 (21 @ 22:16)  Creatinine, Serum: 0.6 mg/dL (21 @ 05:15)      ANTIBIOTICS:      All available historical records have been reviewed

## 2021-03-18 NOTE — DISCHARGE NOTE PROVIDER - NSDCMRMEDTOKEN_GEN_ALL_CORE_FT
ketorolac 10 mg oral tablet: 1 tab(s) orally every 6 hours PRN For pain MDD:40  melatonin 5 mg oral tablet: 1 tab(s) orally once a day (at bedtime)  pantoprazole 40 mg oral delayed release tablet: 1 tab(s) orally once a day (before a meal)  senna oral tablet: 2 tab(s) orally once a day (at bedtime)  simethicone 80 mg oral tablet, chewable: 1 tab(s) orally every 6 hours

## 2021-03-18 NOTE — DISCHARGE NOTE PROVIDER - PROVIDER TOKENS
PROVIDER:[TOKEN:[[id number]]],FREE:[LAST:[Solimon],FIRST:[Dr],PHONE:[(   )    -],FAX:[(   )    -],ADDRESS:[oncologist]],PROVIDER:[TOKEN:[[id number]]] PROVIDER:[TOKEN:[[id number]]],PROVIDER:[TOKEN:[[id number]]],FREE:[LAST:[Solimon],FIRST:[Dr],PHONE:[(   )    -],FAX:[(   )    -],ADDRESS:[oncologist],SCHEDULEDAPPT:[03/19/2021]],PROVIDER:[TOKEN:[[id number]],FOLLOWUP:[1 week]] PROVIDER:[TOKEN:[[id number]]],PROVIDER:[TOKEN:[[id number]]],PROVIDER:[TOKEN:[[id number]],FOLLOWUP:[1 week]],FREE:[LAST:[Solimon],FIRST:[Dr],PHONE:[(   )    -],FAX:[(   )    -],ADDRESS:[oncologist],SCHEDULEDAPPT:[03/19/2021]],PROVIDER:[TOKEN:[[id number]],FOLLOWUP:[1-3 days]]

## 2021-03-18 NOTE — PROGRESS NOTE ADULT - SUBJECTIVE AND OBJECTIVE BOX
Patient is a 40y old  Female who presents with a chief complaint of Syncopal episode (18 Mar 2021 09:29)        SUBJECTIVE:  S/p pleurx drainage yesterday, 200cc removed and sent to lab for gram stain and cultures.      PHYSICAL EXAM  Vital Signs Last 24 Hrs  T(C): 37 (18 Mar 2021 08:08), Max: 37.3 (18 Mar 2021 00:00)  T(F): 98.6 (18 Mar 2021 08:08), Max: 99.1 (18 Mar 2021 00:00)  HR: 92 (18 Mar 2021 08:08) (92 - 98)  BP: 89/51 (18 Mar 2021 08:08) (89/51 - 98/53)  RR: 18 (18 Mar 2021 08:08) (18 - 20)    CONSTITUTIONAL:  Well nourished.  NAD    ENT:   Airway patent,   No thrush    EYES:   Clear bilaterally,   pupils equal, round and reactive to light.    CARDIAC:   Normal rate,   regular rhythm.    no edema    CAROTID:   normal systolic impulse  no bruits    RESPIRATORY:   L pleurx catheter in place  No wheezing  Normal chest expansion  Not tachypneic,  No use of accessory muscles    GASTROINTESTINAL:  Abdomen soft,   non-tender,   no guarding,   + BS    MUSCULOSKELETAL:   range of motion is not limited,  no clubbing, cyanosis    NEUROLOGICAL:   Alert and oriented   no motor  deficits.    SKIN:   Skin normal color for race,   No evidence of rash.    PSYCHIATRIC:   normal mood and affect.   no apparent risk to self or others.      21 @ 07:01  -  21 @ 07:00  --------------------------------------------------------  IN:    Oral Fluid: 120 mL  Total IN: 120 mL    OUT:  Total OUT: 0 mL    Total NET: 120 mL          LABS:                          10.8   10.61 )-----------( 452      ( 18 Mar 2021 05:15 )             35.8                                                   137  |  99  |  18  ----------------------------<  96  4.8   |  28  |  0.6<L>    Ca    9.3      18 Mar 2021 05:15  Mg     2.0     03-18    TPro  6.1  /  Alb  3.4<L>  /  TBili  <0.2  /  DBili  x   /  AST  19  /  ALT  22  /  AlkPhos  90  03-18      PT/INR - ( 17 Mar 2021 06:33 )   PT: 15.80 sec;   INR: 1.37 ratio         PTT - ( 17 Mar 2021 06:33 )  PTT:35.1 sec                                       Urinalysis Basic - ( 17 Mar 2021 05:00 )    Color: Colorless / Appearance: Clear / S.021 / pH: x  Gluc: x / Ketone: Negative  / Bili: Negative / Urobili: <2 mg/dL   Blood: x / Protein: Negative / Nitrite: Negative   Leuk Esterase: Negative / RBC: x / WBC x   Sq Epi: x / Non Sq Epi: x / Bacteria: x        CARDIAC MARKERS ( 18 Mar 2021 05:15 )  x     / <0.01 ng/mL / x     / x     / x      CARDIAC MARKERS ( 17 Mar 2021 06:33 )  x     / <0.01 ng/mL / x     / x     / x      CARDIAC MARKERS ( 16 Mar 2021 15:59 )  x     / <0.01 ng/mL / x     / x     / x                                                LIVER FUNCTIONS - ( 18 Mar 2021 05:15 )  Alb: 3.4 g/dL / Pro: 6.1 g/dL / ALK PHOS: 90 U/L / ALT: 22 U/L / AST: 19 U/L / GGT: x                                                  Culture - Urine (collected 17 Mar 2021 05:00)  Source: .Urine Clean Catch (Midstream)  Final Report (18 Mar 2021 11:24):    No growth    Culture - Blood (collected 16 Mar 2021 15:59)  Source: .Blood Blood  Preliminary Report (18 Mar 2021 01:02):    No growth to date.    Culture - Blood (collected 16 Mar 2021 15:59)  Source: .Blood Blood  Preliminary Report (18 Mar 2021 01:02):    No growth to date.                                                    MEDICATIONS  (STANDING):  ALPRAZolam 0.25 milliGRAM(s) Oral daily  chlorhexidine 4% Liquid 1 Application(s) Topical <User Schedule>  enoxaparin Injectable 40 milliGRAM(s) SubCutaneous daily  melatonin 5 milliGRAM(s) Oral at bedtime  pantoprazole    Tablet 40 milliGRAM(s) Oral before breakfast  saccharomyces boulardii 250 milliGRAM(s) Oral two times a day  senna 2 Tablet(s) Oral at bedtime  sertraline 25 milliGRAM(s) Oral two times a day  simethicone 80 milliGRAM(s) Chew every 6 hours    MEDICATIONS  (PRN):  acetaminophen   Tablet .. 650 milliGRAM(s) Oral every 6 hours PRN Mild Pain (1 - 3)  acetaminophen   Tablet .. 650 milliGRAM(s) Oral every 6 hours PRN Temp greater or equal to 38C (100.4F)  diphenhydrAMINE 25 milliGRAM(s) Oral every 4 hours PRN Rash and/or Itching  ketorolac   Injectable 15 milliGRAM(s) IV Push three times a day PRN Moderate Pain (4 - 6)  morphine  - Injectable 2 milliGRAM(s) IV Push every 6 hours PRN Severe Pain (7 - 10)  ondansetron Injectable 4 milliGRAM(s) IV Push every 8 hours PRN Nausea and/or Vomiting

## 2021-03-18 NOTE — PROGRESS NOTE ADULT - ASSESSMENT
39 y/o woman with a past medical history of advanced Hodgkin's lymphoma (s/p 1 round of ABVD, stooped due to anaphylaxis), malignant L sided pleural effusion s/p Pleurx cath with daily draining. Presented after she syncopised at home while she had her pleural effusion drained via pleurex cath.     # Syncope, most likely vasovagal. Large pericardial effusion.  - ECHO with large pericardial effusion, no tamponade  - spoke to CTS, no interventions offered, awaiting official consult  - orthostatic positive 124/61, HR 95 sitting and 106/65  standing.     # SIRS (Fever, Leukocytosis, Tachycardia), no sepsis, likely related to Hodgkin's lymphoma  - ID work up sent, pt can f/u results as OP  - no ABx for now     # Advanced Hodgkin's Lymphoma: Heme-Onc following with plans to start immuno therapy on Fri. 3/19.     # Anxiety: Continue home medications, it may also be driving some of her tachycardia.     # Chronic Back pain: continue with toradol and tylenol. Morphine for severe pain.    # Pruritis: Benadryl PRN.     If no interventions by CTS pt can be discharged home with OP f/u with heme/onc tomorrow.

## 2021-03-18 NOTE — DISCHARGE NOTE PROVIDER - HOSPITAL COURSE
40 year old F patient with Hx of Hodgkin lymphoma ( Active, s/p one dose of ABVD ) was sent to the ED by her Pulmonologist Dr Fitch after pt stated she fainted on Jorge 3/14. Pt did not go to the ED then after this episode. She stated she felt dizzy and fainted but she does not recall how long she was out for. She also recalls vomitting after. Patient also endorses a non radiating pleuritic chest pain on the right sternal border that increases with cough and deep breaths. Pt has also experienced generalized back pain and spinal tenderness to palpation. She also had a temp of 101 on 3/15 and chills and has been coughing a lot more in the last few days. Patient denied any exertional chest pain, palpitation, dizziness or any other symptoms.     Pt diagnosed with Hodgkin lymphoma back in Aug 2020 when she had similar presentation. Patient used to follow up with Dr. Schultz but after receiving the first dose of the ABVD she developed a "Bad allergic reaction" and switched completely to holistic medicine and has been off any chemotherapy since then.     Pt was recently discharged 2 weeks ago. S/P pleurix catheter placed on 3/5/21. Plan was made to follow up with Dr. Childress to start on immunologics as patient does not want ABVD.    EKG- NSR, with low voltage  ED lab:  WBC of 13.87, Lactate 0.9, Trop <0.01    During hospitalization, patient was seen and evaluated by pulmonary team, Dr Abernathy and infectious disease. Patient doing well clinically. To be discharged with close outpatient follow up with primary doctor and oncologist, Dr Pham. Has scheduled appointment on 3/19 with Dr. Justice for immunotherapy.     40 year old F patient with Hx of Hodgkin lymphoma ( Active, s/p one dose of ABVD ) was sent to the ED by her Pulmonologist Dr Fitch after pt stated she fainted on Jorge 3/14. Pt did not go to the ED then after this episode. She stated she felt dizzy and fainted but she does not recall how long she was out for. She also recalls vomitting after. Patient also endorses a non radiating pleuritic chest pain on the right sternal border that increases with cough and deep breaths. Pt has also experienced generalized back pain and spinal tenderness to palpation. She also had a temp of 101 on 3/15 and chills and has been coughing a lot more in the last few days. Patient denied any exertional chest pain, palpitation, dizziness or any other symptoms.     Pt diagnosed with Hodgkin lymphoma back in Aug 2020 when she had similar presentation. Patient used to follow up with Dr. Schultz but after receiving the first dose of the ABVD she developed a "Bad allergic reaction" and switched completely to holistic medicine and has been off any chemotherapy since then.     Pt was recently discharged 2 weeks ago. S/P pleurix catheter placed on 3/5/21. Plan was made to follow up with Dr. Childress to start on immunologics as patient does not want ABVD.    EKG- NSR, with low voltage  ED lab:  WBC of 13.87, Lactate 0.9, Trop <0.01    CT chest and ECHO positive for large pericardial effusion, CTS consulted, they recommended observation, no interventions at this time. Pt advised to repeat ECHO in 1 week. If feels worse, SOB, chest pain or recurrent syncope advised to come back to ED.     During hospitalization, patient was seen and evaluated by pulmonary team, Dr Abernathy and infectious disease. Patient doing well clinically. To be discharged with close outpatient follow up with primary doctor, pulmonary and oncologist, Dr Pham. Has scheduled appointment on 3/19 with Dr. Justice for immunotherapy.

## 2021-03-18 NOTE — PROGRESS NOTE ADULT - ASSESSMENT
40 year old F patient with Hx of Hodgkin lymphoma ( Active, s/p one dose of ABVD ) was sent to the ED by her Pulmonologist Dr Fitch after pt stated she fainted on Jorge 3/14.    # Syncope - possible vasovagal event but r/o cardiac causes  - EKG NSR, with low voltage.  - orthostatics negative  - 2d Echo ordered    # Pericardial Effusion  - no evidence of heart dysfunction on CT  - no signs of pericarditis  - may benefit from lasix if orthostatic negative    # SIRS (Fever, Leukocytosis, Tachycardia)  - unclear source of an infection, possible B symptoms? vs. infectious etiology  - s/p vanco and cefepime in ED  - blood and urine cultures negative  - pleural fluid gram stain, glucose and protein negative  - outpt f/u pleural fluid (sent by pulmonary on 3/17)  - outpt f/u galactomannan, fungitell, serum Crypt antigen    # Advanced Hodgkin's Lymphoma  - Heme-Onc following with plans to start outpt immunotherapy on Fri. 3/19  - Spoke with Dr. Pham on 3/17    # Anxiety  - Continue home medications, it may also be driving some of her tachycardia.     # Chronic Back pain  - continue with toradol and tylenol PRN  - morphine for severe pain PRN. will try to taper down    Pruritis: Benadryl PRN.     DVT ppx: Lovenox  GI ppx: Protonix while taking toradol.   GOC: Full code. 40 year old F patient with Hx of Hodgkin lymphoma ( Active, s/p one dose of ABVD ) was sent to the ED by her Pulmonologist Dr Fitch after pt stated she fainted on Jorge 3/14.    # Syncope - possible vasovagal event but r/o cardiac causes  - EKG NSR, with low voltage.  - orthostatics negative  - 2d Echo ordered  - d/c pending echo results    # Pericardial Effusion  - no evidence of heart dysfunction on CT  - no signs of pericarditis  - may benefit from lasix if orthostatic negative    # SIRS (Fever, Leukocytosis, Tachycardia)  - unclear source of an infection, possible B symptoms? vs. infectious etiology  - s/p vanco and cefepime in ED  - blood and urine cultures negative  - pleural fluid gram stain, glucose and protein negative  - outpt f/u pleural fluid (sent by pulmonary on 3/17)  - outpt f/u galactomannan, fungitell, serum Crypt antigen    # Advanced Hodgkin's Lymphoma  - Heme-Onc following with plans to start outpt immunotherapy on Fri. 3/19  - Spoke with Dr. Pham on 3/17    # Anxiety  - Continue home medications, it may also be driving some of her tachycardia.     # Chronic Back pain  - continue with toradol and tylenol PRN  - morphine for severe pain PRN. will try to taper down    Pruritis: Benadryl PRN.     DVT ppx: Lovenox  GI ppx: Protonix while taking toradol.   GOC: Full code. 40 year old F patient with Hx of Hodgkin lymphoma ( Active, s/p one dose of ABVD ) was sent to the ED by her Pulmonologist Dr Fitch after pt stated she fainted on Jorge 3/14.    # Syncope - possible vasovagal event but r/o cardiac causes  - EKG NSR, with low voltage.  - orthostatics negative  - 2d Echo ordered  - d/c pending echo results    # Pericardial Effusion  - no evidence of heart dysfunction on CT  - no signs of pericarditis    # SIRS (Fever, Leukocytosis, Tachycardia)  - unclear source of an infection, possible B symptoms? vs. infectious etiology  - s/p vanco and cefepime in ED  - blood and urine cultures negative  - pleural fluid gram stain, glucose and protein negative  - outpt f/u pleural fluid (sent by pulmonary on 3/17)  - outpt f/u galactomannan, fungitell, serum Crypt antigen    # Advanced Hodgkin's Lymphoma  - Heme-Onc following with plans to start outpt immunotherapy on Fri. 3/19  - Spoke with Dr. Pham on 3/17    # Anxiety  - Continue home medications, it may also be driving some of her tachycardia.     # Chronic Back pain  - continue with toradol and tylenol PRN  - morphine for severe pain PRN. will try to taper down    Pruritis: Benadryl PRN.     DVT ppx: Lovenox  GI ppx: Protonix while taking toradol.   GOC: Full code.  Dispo: Home today pending echo results.

## 2021-03-18 NOTE — PROGRESS NOTE ADULT - ASSESSMENT
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

## 2021-03-18 NOTE — CONSULT NOTE ADULT - SUBJECTIVE AND OBJECTIVE BOX
CLARA LORENZANA  40y, Female  Allergy: No Known Allergies      CHIEF COMPLAINT: Shortness of breath (17 Mar 2021 12:36)      LOS  1d    HPI:  40 year old F patient with Hx of Hodgkin lymphoma ( Active, s/p one dose of ABVD ) was sent to the ED by her Pulmonologist Dr Fitch after pt stated she fainted on Jorge 3/14. Pt did not go to the ED then after this episode. She stated she felt dizzy and fainted but she does not recall how long she was out for. She also recalls vomitting after. Patient also endorses a non radiating pleuritic chest pain on the right sternal border that increases with cough and deep breaths. Pt has also experienced generalized back pain and spinal tenderness to palpation. She also had a temp of 101 on 3/15 and chills and has been coughing a lot more in the last few days. Patient denied any exertional chest pain, palpitation, dizziness or any other symptoms.     Pt diagnosed with Hodgkin lymphoma back in Aug 2020 when she had similar presentation. Patient used to follow up with Dr. Schultz but after receiving the first dose of the ABVD she developed a "Bad allergic reaction" and switched completely to holistic medicine and has been off any chemotherapy since then.     Pt was recently discharged 2 weeks ago. S/P pleurix catheter placed on 3/5/21. Plan was made to follow up with Dr. Childress to start on immunologics as patient does not want ABVD.    INFECTIOUS DISEASE HISTORY:  History as above.   Has been having worsening back pain in past few days.   More recently associated with nausea, vomiting, fevers, and chills.   Denies any coughing, diarrhea, dysuria, hematuria.     PAST MEDICAL & SURGICAL HISTORY:  Hodgkin lymphoma  Active        FAMILY HISTORY  Family hx reviewed and non-contributory     SOCIAL HISTORY  Social History:  Marital Status:  (   )    (   ) Single    (   )    (  )   Lives with: (  ) alone  (  ) children   (  ) spouse   (  ) parents  (  ) other  Recent Travel: No recent travel  Occupation:    Substance Use (street drugs): ( x ) never used  (  ) other:  Tobacco Usage:  ( x  ) never smoked   (   ) former smoker   (   ) current smoker  (     ) pack year  Alcohol Usage: None (16 Mar 2021 21:14)        ROS  General: Denies rigors, nightsweats  HEENT: Denies headache, rhinorrhea, sore throat, eye pain  CV: Denies CP, palpitations  PULM: Denies wheezing, hemoptysis  GI: Denies hematemesis, hematochezia, melena  : Denies discharge, hematuria  MSK: Denies arthralgias, myalgias  SKIN: Denies rash, lesions  NEURO: Denies paresthesias, weakness  PSYCH: Denies depression, anxiety    VITALS:  T(F): 96.1, Max: 98.2 (21 @ 22:16)  HR: 95  BP: 92/57  RR: 20Vital Signs Last 24 Hrs  T(C): 35.6 (17 Mar 2021 15:17), Max: 36.8 (16 Mar 2021 22:16)  T(F): 96.1 (17 Mar 2021 15:17), Max: 98.2 (16 Mar 2021 22:16)  HR: 95 (17 Mar 2021 15:17) (95 - 111)  BP: 92/57 (17 Mar 2021 15:17) (90/54 - 109/61)  BP(mean): --  RR: 20 (17 Mar 2021 15:17) (20 - 20)  SpO2: --    PHYSICAL EXAM:  Gen: NAD, resting in bed  HEENT: Normocephalic, atraumatic  Neck: supple, no lymphadenopathy  CV: Regular rate & regular rhythm  Lungs: decreased BS at bases, no fremitus  Abdomen: Soft, BS present  Ext: Warm, well perfused  Neuro: non focal, awake  Skin: no rash, no erythema  Lines: no phlebitis    TESTS & MEASUREMENTS:                        10.0   12.04 )-----------( 416      ( 17 Mar 2021 06:33 )             32.5         135  |  98  |  11  ----------------------------<  92  4.4   |  27  |  0.6<L>    Ca    9.4      17 Mar 2021 06:33  Mg     1.8         TPro  5.7<L>  /  Alb  3.0<L>  /  TBili  0.2  /  DBili  x   /  AST  15  /  ALT  16  /  AlkPhos  79      eGFR if Non African American: 112 mL/min/1.73M2 (21 @ 06:33)  eGFR if African American: 130 mL/min/1.73M2 (21 @ 06:33)    LIVER FUNCTIONS - ( 17 Mar 2021 06:33 )  Alb: 3.0 g/dL / Pro: 5.7 g/dL / ALK PHOS: 79 U/L / ALT: 16 U/L / AST: 15 U/L / GGT: x           Urinalysis Basic - ( 17 Mar 2021 05:00 )    Color: Colorless / Appearance: Clear / S.021 / pH: x  Gluc: x / Ketone: Negative  / Bili: Negative / Urobili: <2 mg/dL   Blood: x / Protein: Negative / Nitrite: Negative   Leuk Esterase: Negative / RBC: x / WBC x   Sq Epi: x / Non Sq Epi: x / Bacteria: x        Culture - Fungal, Body Fluid (collected 21 @ 08:55)  Source: .Body Fluid Pleural Fluid  Preliminary Report (21 @ 15:03):    No growth    Culture - Body Fluid with Gram Stain (collected 21 @ 08:55)  Source: .Body Fluid Pleural Fluid  Gram Stain (21 @ 20:44):    polymorphonuclear leukocytes seen    No organisms seen    by cytocentrifuge  Final Report (03-10-21 @ 17:20):    No growth at 5 days    Culture - Fungal, Body Fluid (collected 21 @ 09:20)  Source: .Body Fluid Pleural Fluid  Preliminary Report (03-10-21 @ 15:02):    No growth    Culture - Body Fluid with Gram Stain (collected 21 @ 09:20)  Source: .Body Fluid Pleural Fluid  Gram Stain (21 @ 22:50):    polymorphonuclear leukocytes seen    No organisms seen    by cytocentrifuge  Final Report (21 @ 17:03):    No growth at 5 days    Culture - Fungal, Body Fluid (collected 21 @ 02:43)  Source: .Body Fluid Pleural Fluid  Preliminary Report (21 @ 15:02):    No growth    Culture - Body Fluid with Gram Stain (collected 21 @ 02:43)  Source: .Body Fluid Pleural Fluid  Gram Stain (21 @ 13:31):    polymorphonuclear leukocytes seen per low power field    No organisms seen per oil power field    by cytocentrifuge  Final Report (21 @ 10:44):    No growth at 5 days        Lactate, Blood: 0.7 mmol/L (21 @ 06:33)  Lactate, Blood: 0.9 mmol/L (21 @ 15:59)      INFECTIOUS DISEASES TESTING  COVID-19 PCR: NotDetec (21 @ 16:39)      RADIOLOGY & ADDITIONAL TESTS:  I have personally reviewed the last Chest xray  CXR  Xray Chest 1 View AP/PA:   EXAM:  XR CHEST 1 VIEW            PROCEDURE DATE:  2021            INTERPRETATION:  Clinical History / Reason for exam: Shortness of breath.    Comparison : Chest radiograph 4 days prior.    Technique/Positioning: Frontal portable.    Findings:    Support devices: None.    Cardiac/mediastinum/hilum: Enlarged mediastinum without change.    Lung parenchyma/Pleura: Retrocardiac opacity    Skeleton/soft tissues: Unchanged    Impression:    Unchanged appearance of the mediastinum.                  PAU MAN MD; Attending Interventional Radiologist  This document has been electronically signed. Mar 16 2021  4:42PM (21 @ 16:33)      CT  CT Abdomen and Pelvis w/ IV Cont:   EXAM:  CT ABDOMEN AND PELVIS IC        EXAM:  CT ANGIO CHEST PE PROTOCOL IC            PROCEDURE DATE:  2021            INTERPRETATION:  CLINICAL STATEMENT: Abdominal pain. Fever.    TECHNIQUE: Multislice helical sections were obtained from the thoracic inlet to the lung bases during rapid administration of 100cc Isovue-370 intravenous contrast using a CTA protocol. Subsequently, axial CT sections were obtained from the domes of the diaphragms to the pubic symphysis. Thin sections were reconstructed through the pulmonary vasculature. Sagittal and coronal reformatted images were acquired, as well as MIP reconstructed images.    COMPARISON: CT  Chest 2021.    FINDINGS:    CHEST:    PULMONARY EMBOLUS: No pulmonary embolus.    LUNGS/PLEURA/AIRWAYS: Central airways are patent. Interval placement of a left basal pleural catheter with interval significant decrease in left pleural effusion. Small residual hydropneumothorax with areas of loculation most pronounced along the bases. Interval reexpansion of the left lung revealing innumerable left lung nodules. There is diffuse peribronchial and perivascular cuffing. Interlobular septal thickening is present. The fissure demonstrate nodular thickening. Small to moderate-sized right pleural effusion is without significant interval change with adjacent right lower lobe subsegmental atelectasis.    MEDIASTINUM/THORACIC NODES: Large anterior mediastinal mass extending into the supraclavicular space and encasing the aortic arch and major arch vessels. There is bilateral hilar lymphadenopathy encasing hilar airways and vessels. Bilateral axillary lymphadenopathy and supraclavicular lymphadenopathy is also present.    HEART/GREAT VESSELS: Moderate pericardial effusion appears slightly increases the prior exam. No CT evidence of acute right heart strain.      ABDOMEN/PELVIS:    HEPATOBILIARY: Unremarkable.    SPLEEN: Unremarkable.    PANCREAS: Unremarkable.    ADRENAL GLANDS: Unremarkable.    KIDNEYS: Symmetric enhancement. No hydronephrosis.    ABDOMINOPELVIC NODES: Multiple nonspecific subcentimeter to mildly enlarged mesenteric lymph nodes.    PELVIC ORGANS: Unremarkable.    PERITONEUM/MESENTERY/BOWEL: No bowel obstruction, pneumoperitoneum, pneumatosis or ascites..    BONES/SOFT TISSUES: No aggressive osseous lesion. Left pectoralis muscle thickening and chest wall edema, decreased since prior exam.    OTHER: Normal caliber aorta.      IMPRESSION:    No pulmonary embolus or CT evidence of acute right heart strain.    Interval placement of a left pleural catheter with significant decrease in the left pleural effusion. Small residual hydropneumothorax is present with areas of loculation. Small-to-moderate size right pleural effusion without significant change.    Large anterior mediastinal mass extending into the supraclavicular space encasing the arch vessels and interspersed structures. Bilateral hilar, supraclavicular and axillary bulky lymphadenopathy. Findings consistent with known history of lymphoma.    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 Additionally, increased aeration left lung revealing innumerable left lung nodules. These can be infectious or metastatic in etiology.    Interlobular septal thickening, peribronchial and perivascular cuffing on the left likely secondary to lymphatic congestion from hilar lymphadenopathy.    Moderate pericardial effusion, increased since prior exam.    Left pectoralis muscle thickening and chest wall edema, decreased since prior exam.    Spoke with MICK BOSCH MD on 3/16/2021 6:35 PM with readback.              STEVEN IGNACIO MD; Attending Radiologist  This document has been electronically signed. Mar 16 2021  6:37PM (21 @ 17:48)      CARDIOLOGY TESTING  12 Lead ECG:   Ventricular Rate 86 BPM    Atrial Rate 86 BPM    P-R Interval 126 ms    QRS Duration 74 ms    Q-T Interval 350 ms    QTC Calculation(Bazett) 418 ms    P Axis 62 degrees    R Axis 83 degrees    T Axis 72 degrees    Diagnosis Line Normal sinus rhythm  Low voltage QRS  Poor R wave progression  Abnormal ECG    Confirmed by Medhat Marquez ([id number]) on 3/17/2021 8:37:22 AM (21 @ 07:55)  12 Lead ECG:   Ventricular Rate 127 BPM    Atrial Rate 127 BPM    P-R Interval 124 ms    QRS Duration 74 ms    Q-T Interval 288 ms    QTC Calculation(Bazett) 418 ms    P Axis 54 degrees    R Axis 71 degrees    T Axis 60 degrees    Diagnosis Line Sinus tachycardia  Low voltage QRS  Possible Lateral infarct , age undetermined  Abnormal ECG    Confirmed by DENZEL MANCILLA MD ([id number]) on 3/16/2021 3:43:52 PM (21 @ 13:49)      MEDICATIONS  ALPRAZolam 0.25 Oral daily  chlorhexidine 4% Liquid 1 Topical <User Schedule>  enoxaparin Injectable 40 SubCutaneous daily  melatonin 5 Oral at bedtime  pantoprazole    Tablet 40 Oral before breakfast  senna 2 Oral at bedtime  sertraline 25 Oral two times a day  simethicone 80 Chew every 6 hours      Weight  Weight (kg): 54.4 (21 @ 22:16)    ANTIBIOTICS:      ALLERGIES:  No Known Allergies    
CLARA LORENZANA [id number]  40y Female    HPI:  40 year old F patient with Hx of Hodgkin lymphoma ( Active, s/p one dose of ABVD ) was sent to the ED by her Pulmonologist Dr Fitch after pt stated she fainted on Jorge 3/14. Pt did not go to the ED then after this episode. She stated she felt dizzy and fainted but she does not recall how long she was out for. She also recalls vomitting after. Patient also endorses a non radiating pleuritic chest pain on the right sternal border that increases with cough and deep breaths. Pt has also experienced generalized back pain and spinal tenderness to palpation. She also had a temp of 101 on 3/15 and chills and has been coughing a lot more in the last few days. Patient denied any exertional chest pain, palpitation, dizziness or any other symptoms. Pt diagnosed with Hodgkin lymphoma back in Aug 2020 when she had similar presentation. Patient used to follow up with Dr. Schultz but after receiving the first dose of the ABVD she developed a "Bad allergic reaction" and switched completely to holistic medicine and has been off any chemotherapy since then. Pt was recently discharged 2 weeks ago. S/P pleurix catheter placed on 3/5/21. Plan was made to follow up with Dr. Childress to start on immunologics as patient does not want ABVD.    CT Surgery was called for CT and Echo findings of large pericardial effusion. The patient is clinically stable without CP or SOB and remains hemodynamically stable, however her echo did mention small amount of right atrial collapse so CT surgery was consulted. The patient is scheduled to start chemotherapy tomorrow.     Vital Signs Last 24 Hrs  T(C): 36.8 (16 Mar 2021 16:04), Max: 36.9 (16 Mar 2021 13:20)  T(F): 98.2 (16 Mar 2021 16:04), Max: 98.4 (16 Mar 2021 13:20)  HR: 121 (16 Mar 2021 16:04) (121 - 123)  BP: 102/66 (16 Mar 2021 16:04) (102/66 - 114/62)  RR: 18 (16 Mar 2021 16:04) (18 - 20)  SpO2: 97% (16 Mar 2021 16:04) (97% - 98%)   (16 Mar 2021 21:14)    PAST MEDICAL & SURGICAL HISTORY:  Hodgkin lymphoma  Active    MEDICATIONS  (STANDING):  ALPRAZolam 0.25 milliGRAM(s) Oral daily  chlorhexidine 4% Liquid 1 Application(s) Topical <User Schedule>  enoxaparin Injectable 40 milliGRAM(s) SubCutaneous daily  melatonin 5 milliGRAM(s) Oral at bedtime  pantoprazole    Tablet 40 milliGRAM(s) Oral before breakfast  saccharomyces boulardii 250 milliGRAM(s) Oral two times a day  senna 2 Tablet(s) Oral at bedtime  sertraline 25 milliGRAM(s) Oral two times a day  simethicone 80 milliGRAM(s) Chew every 6 hours    MEDICATIONS  (PRN):  acetaminophen   Tablet .. 650 milliGRAM(s) Oral every 6 hours PRN Mild Pain (1 - 3)  acetaminophen   Tablet .. 650 milliGRAM(s) Oral every 6 hours PRN Temp greater or equal to 38C (100.4F)  diphenhydrAMINE 25 milliGRAM(s) Oral every 4 hours PRN Rash and/or Itching  ketorolac   Injectable 15 milliGRAM(s) IV Push three times a day PRN Moderate Pain (4 - 6)  morphine  - Injectable 2 milliGRAM(s) IV Push every 6 hours PRN Severe Pain (7 - 10)  ondansetron Injectable 4 milliGRAM(s) IV Push every 8 hours PRN Nausea and/or Vomiting    Allergies: No Known Allergies    REVIEW OF SYSTEMS    [x] A ten-point review of systems was otherwise negative except as noted.  [ ] Due to altered mental status/intubation, subjective information were not able to be obtained from the patient. History was obtained, to the extent possible, from review of the chart and collateral sources of information.      Vital Signs Last 24 Hrs  T(C): 37.1 (18 Mar 2021 16:17), Max: 37.3 (18 Mar 2021 00:00)  T(F): 98.8 (18 Mar 2021 16:17), Max: 99.1 (18 Mar 2021 00:00)  HR: 101 (18 Mar 2021 16:17) (92 - 101)  BP: 92/54 (18 Mar 2021 16:17) (89/51 - 98/53)  RR: 18 (18 Mar 2021 16:17) (18 - 20)    PHYSICAL EXAM:  GENERAL: NAD, well-appearing  CHEST/LUNG: Clear to auscultation bilaterally  HEART: Regular rate and rhythm  ABDOMEN: Soft, Nontender, Nondistended;     LABS:                      10.8   10.61 )-----------( 452      ( 18 Mar 2021 05:15 )             35.8       Auto Neutrophil %: 80.0 % (21 @ 05:15)  Auto Immature Granulocyte %: 0.6 % (21 @ 05:15)        137  |  99  |  18  ----------------------------<  96  4.8   |  28  |  0.6<L>      Calcium, Total Serum: 9.3 mg/dL (21 @ 05:15)      LFTs:             6.1  | <0.2 | 19       ------------------[90      ( 18 Mar 2021 05:15 )  3.4  | x    | 22          Lactate, Blood: 0.7 mmol/L (21 @ 06:33)  Lactate, Blood: 0.9 mmol/L (21 @ 15:59)    Coags:     15.80  ----< 1.37    ( 17 Mar 2021 06:33 )     35.1      CARDIAC MARKERS ( 18 Mar 2021 05:15 )  x     / <0.01 ng/mL / x     / x     / x      CARDIAC MARKERS ( 17 Mar 2021 06:33 )  x     / <0.01 ng/mL / x     / x     / x        Serum Pro-Brain Natriuretic Peptide: 56 pg/mL (21 @ 15:59)    Urinalysis Basic - ( 17 Mar 2021 05:00 )    Color: Colorless / Appearance: Clear / S.021 / pH: x  Gluc: x / Ketone: Negative  / Bili: Negative / Urobili: <2 mg/dL   Blood: x / Protein: Negative / Nitrite: Negative   Leuk Esterase: Negative / RBC: x / WBC x   Sq Epi: x / Non Sq Epi: x / Bacteria: x    Culture - Acid Fast - Body Fluid w/Smear (collected 17 Mar 2021 18:00)  Source: .Body Fluid Pleural Fluid    Culture - Urine (collected 17 Mar 2021 05:00)  Source: .Urine Clean Catch (Midstream)  Final Report (18 Mar 2021 11:24):    No growth    Culture - Blood (collected 16 Mar 2021 15:59)  Source: .Blood Blood  Preliminary Report (18 Mar 2021 01:02):    No growth to date.    Culture - Blood (collected 16 Mar 2021 15:59)  Source: .Blood Blood  Preliminary Report (18 Mar 2021 01:02):    No growth to date.    RADIOLOGY & ADDITIONAL STUDIES:  < from: CT Angio Chest PE Protocol w/ IV Cont (21 @ 17:48) >  IMPRESSION:    No pulmonary embolus or CT evidence of acute right heart strain.    Interval placement of a left pleural catheter with significant decrease in the left pleural effusion. Small residual hydropneumothorax is present with areas of loculation. Small-to-moderate size right pleural effusion without significant change.    Large anterior mediastinal mass extending into the supraclavicular space encasing the arch vessels and interspersed structures. Bilateral hilar, supraclavicular and axillary bulky lymphadenopathy. Findings consistent with known history of lymphoma.    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 Additionally, increased aeration left lung revealing innumerable left lung nodules. These can be infectious or metastatic in etiology.    Interlobular septal thickening, peribronchial and perivascular cuffing on the left likely secondary to lymphatic congestion from hilar lymphadenopathy.    Moderate pericardial effusion, increased since prior exam.    Left pectoralis muscle thickening and chest wall edema, decreased since prior exam.    < end of copied text >      < from: TTE Echo Complete w/o Contrast w/ Doppler (21 @ 13:37) >  Summary:   1. LV Ejection Fraction by Tobar's Method with a biplane EF of 59 %.   2. Large pericardial effusion. There is no definite evidence of cardiac tamponade, however there is inversion of the right atrial wall, which may represent early hemodynamic compromise.   3. No evidence of mitral valve regurgitation.   4. Normal left atrial size.   5. Normal right atrial size.   6. Mild tricuspid regurgitation.    PHYSICIAN INTERPRETATION:  Left Ventricle: Normal left ventricular size and wall thicknesses, with normal systolic and diastolic function.  Right Ventricle: Normal right ventricular size and function.  Left Atrium: Normal left atrial size.  Right Atrium: Normal right atrial size.  Pericardium: A large pericardial effusion is present. There is no definite evidence of cardiac tamponade, however there is inversion of the right atrial wall, which may represent early hemodynamic compromise.  Mitral Valve: Structurally normal mitral valve, with normal leaflet excursion. No evidence of mitral valve regurgitation is seen.  Tricuspid Valve: Structurally normal tricuspid valve, with normal leaflet excursion. Mild tricuspid regurgitation is visualized.  Aortic Valve: Normal trileaflet aortic valve with normalopening. No evidence of aortic valve regurgitation is seen.  Pulmonic Valve: Structurally normal pulmonic valve, with normal leaflet excursion. Trace pulmonic valve regurgitation.  Aorta: The aortic root and ascending aorta are structurally normal, with no evidence of dilitation.  Pulmonary Artery: The main pulmonary artery is normal in size.  Venous: The inferior vena cava was normal sized, with respiratory size variation greater than 50%.    < end of copied text >  
was discharged from [facility] few days ago   today in ER for FAINTING AND SOB   called pulmonary and was told to come to ER
Patient is a 40y old  Female who presents with a chief complaint of     HPI:  40 year old F patient with Hx of Hodgkin lymphoma ( Active, s/p one dose of ABVD ) was sent to the ED by her Pulmonologist Dr Fitch after pt stated she fainted on Jorge 3/14. Pt did not go to the ED then after this episode. She stated she felt dizzy and fainted but she does not recall how long she was out for. She also recalls vomitting after. Patient also endorses a non radiating pleuritic chest pain on the right sternal border that increases with cough and deep breaths. Pt has also experienced generalized back pain and spinal tenderness to palpation. She also had a temp of 101 on 3/15 and chills and has been coughing a lot more in the last few days. Patient denied any exertional chest pain, palpitation, dizziness or any other symptoms.     Pt diagnosed with Hodgkin lymphoma back in Aug 2020 when she had similar presentation. Patient used to follow up with Dr. Schultz but after receiving the first dose of the ABVD she developed a "Bad allergic reaction" and switched completely to holistic medicine and has been off any chemotherapy since then.     Pt was recently discharged 2 weeks ago. S/P pleurix catheter placed on 3/5/21. Plan was made to follow up with Dr. Childress to start on immunologics as patient does not want ABVD.      EKG- NSR    ED lab:  WBC of 13.87, Lactate 0.9, Trop <0.01    Vital Signs Last 24 Hrs  T(C): 36.8 (16 Mar 2021 16:04), Max: 36.9 (16 Mar 2021 13:20)  T(F): 98.2 (16 Mar 2021 16:04), Max: 98.4 (16 Mar 2021 13:20)  HR: 121 (16 Mar 2021 16:04) (121 - 123)  BP: 102/66 (16 Mar 2021 16:04) (102/66 - 114/62)  BP(mean): --  RR: 18 (16 Mar 2021 16:04) (18 - 20)  SpO2: 97% (16 Mar 2021 16:04) (97% - 98%)   (16 Mar 2021 21:14)      PAST MEDICAL & SURGICAL HISTORY:  Hodgkin lymphoma  Active        SOCIAL HX:   Smoking     never smoker                    ETOH      denies                      Other denies illicit drug use, from home, ambulates independently denies travel outside country in past 6 months, employed works for [employer] ex    FAMILY HISTORY:  .  No cardiovascular or pulmonary family history     REVIEW OF SYSTEMS:    All ROS are negative except per HPI       Allergies    No Known Allergies    Intolerances          PHYSICAL EXAM  Vital Signs Last 24 Hrs  T(C): 36.2 (17 Mar 2021 07:58), Max: 36.9 (16 Mar 2021 13:20)  T(F): 97.1 (17 Mar 2021 07:58), Max: 98.4 (16 Mar 2021 13:20)  HR: 97 (17 Mar 2021 07:58) (97 - 123)  BP: 90/54 (17 Mar 2021 07:58) (90/54 - 114/62)  RR: 20 (17 Mar 2021 07:58) (18 - 20)  SpO2: 97% (16 Mar 2021 16:04) (97% - 98%)    CONSTITUTIONAL:  Well nourished.  NAD    ENT:   Airway patent,   No thrush    EYES:   Clear bilaterally,   pupils equal,   round and reactive to light.    CARDIAC:   Normal rate,   regular rhythm.    no edema    RESPIRATORY:   RA  Inspection- normal chest wall symmetry  Palpation- normal chest wall expansion  Auscultation- cleft pleurx catheter in place, site looks clean  Lung US- small right pleural effusion with atelectasis  No wheezing   Normal chest expansion  Not tachypneic,  No use of accessory muscles    GASTROINTESTINAL:  Abdomen soft, non-tender,   No guarding,   Positive BS    MUSCULOSKELETAL:   Range of motion is not limited,  No clubbing, cyanosis    NEUROLOGICAL:   AOx4  Follows commands  Moves all extremities   Alert and oriented   No motor deficits.    SKIN:   Skin normal color for race,   No evidence of rash.          LABS:                          10.0   12.04 )-----------( 416      ( 17 Mar 2021 06:33 )             32.5                                               03-17    135  |  98  |  11  ----------------------------<  92  4.4   |  27  |  0.6<L>    Ca    9.4      17 Mar 2021 06:33  Mg     1.8     03-17    TPro  5.7<L>  /  Alb  3.0<L>  /  TBili  0.2  /  DBili  x   /  AST  15  /  ALT  16  /  AlkPhos  79  03-17      PT/INR - ( 17 Mar 2021 06:33 )   PT: 15.80 sec;   INR: 1.37 ratio         PTT - ( 17 Mar 2021 06:33 )  PTT:35.1 sec                                       Urinalysis Basic - ( 17 Mar 2021 05:00 )    Color: Colorless / Appearance: Clear / S.021 / pH: x  Gluc: x / Ketone: Negative  / Bili: Negative / Urobili: <2 mg/dL   Blood: x / Protein: Negative / Nitrite: Negative   Leuk Esterase: Negative / RBC: x / WBC x   Sq Epi: x / Non Sq Epi: x / Bacteria: x        CARDIAC MARKERS ( 17 Mar 2021 06:33 )  x     / <0.01 ng/mL / x     / x     / x      CARDIAC MARKERS ( 16 Mar 2021 15:59 )  x     / <0.01 ng/mL / x     / x     / x                                                LIVER FUNCTIONS - ( 17 Mar 2021 06:33 )  Alb: 3.0 g/dL / Pro: 5.7 g/dL / ALK PHOS: 79 U/L / ALT: 16 U/L / AST: 15 U/L / GGT: x                                                                                                MEDICATIONS  (STANDING):  ALPRAZolam 0.25 milliGRAM(s) Oral daily  chlorhexidine 4% Liquid 1 Application(s) Topical <User Schedule>  enoxaparin Injectable 40 milliGRAM(s) SubCutaneous daily  melatonin 5 milliGRAM(s) Oral at bedtime  pantoprazole    Tablet 40 milliGRAM(s) Oral before breakfast  senna 2 Tablet(s) Oral at bedtime  sertraline 25 milliGRAM(s) Oral two times a day  simethicone 80 milliGRAM(s) Chew every 6 hours    MEDICATIONS  (PRN):  acetaminophen   Tablet .. 650 milliGRAM(s) Oral every 6 hours PRN Mild Pain (1 - 3)  acetaminophen   Tablet .. 650 milliGRAM(s) Oral every 6 hours PRN Temp greater or equal to 38C (100.4F)  diphenhydrAMINE 25 milliGRAM(s) Oral every 4 hours PRN Rash and/or Itching  ketorolac   Injectable 15 milliGRAM(s) IV Push three times a day PRN Moderate Pain (4 - 6)  morphine  - Injectable 4 milliGRAM(s) IV Push every 4 hours PRN Severe Pain (7 - 10)  ondansetron Injectable 4 milliGRAM(s) IV Push every 8 hours PRN Nausea and/or Vomiting      X-Rays reviewed:    CXR interpreted by me: left pleurx catheter in place

## 2021-03-19 ENCOUNTER — APPOINTMENT (OUTPATIENT)
Dept: PULMONOLOGY | Facility: CLINIC | Age: 41
End: 2021-03-19

## 2021-03-19 DIAGNOSIS — J90 PLEURAL EFFUSION, NOT ELSEWHERE CLASSIFIED: ICD-10-CM

## 2021-03-19 LAB
CRYPTOC AG FLD QL: NEGATIVE — SIGNIFICANT CHANGE UP
FUNGITELL: <31 PG/ML — SIGNIFICANT CHANGE UP
PROCALCITONIN SERPL-MCNC: 0.06 NG/ML — SIGNIFICANT CHANGE UP (ref 0.02–0.1)

## 2021-03-22 LAB
CULTURE RESULTS: SIGNIFICANT CHANGE UP
CULTURE RESULTS: SIGNIFICANT CHANGE UP
SPECIMEN SOURCE: SIGNIFICANT CHANGE UP
SPECIMEN SOURCE: SIGNIFICANT CHANGE UP

## 2021-03-24 ENCOUNTER — OUTPATIENT (OUTPATIENT)
Dept: OUTPATIENT SERVICES | Facility: HOSPITAL | Age: 41
LOS: 1 days | Discharge: HOME | End: 2021-03-24
Payer: MEDICAID

## 2021-03-24 ENCOUNTER — RESULT REVIEW (OUTPATIENT)
Age: 41
End: 2021-03-24

## 2021-03-24 ENCOUNTER — APPOINTMENT (OUTPATIENT)
Dept: CARDIOTHORACIC SURGERY | Facility: CLINIC | Age: 41
End: 2021-03-24
Payer: COMMERCIAL

## 2021-03-24 DIAGNOSIS — C81.90 HODGKIN LYMPHOMA, UNSPECIFIED, UNSPECIFIED SITE: ICD-10-CM

## 2021-03-24 DIAGNOSIS — R06.02 SHORTNESS OF BREATH: ICD-10-CM

## 2021-03-24 DIAGNOSIS — R07.9 CHEST PAIN, UNSPECIFIED: ICD-10-CM

## 2021-03-24 PROCEDURE — 71046 X-RAY EXAM CHEST 2 VIEWS: CPT | Mod: 26

## 2021-03-25 ENCOUNTER — APPOINTMENT (OUTPATIENT)
Dept: CARDIOLOGY | Facility: CLINIC | Age: 41
End: 2021-03-25
Payer: MEDICAID

## 2021-03-25 VITALS
HEART RATE: 94 BPM | SYSTOLIC BLOOD PRESSURE: 92 MMHG | WEIGHT: 123 LBS | DIASTOLIC BLOOD PRESSURE: 58 MMHG | BODY MASS INDEX: 23.22 KG/M2 | TEMPERATURE: 97.7 F | HEIGHT: 61 IN

## 2021-03-25 PROCEDURE — 99072 ADDL SUPL MATRL&STAF TM PHE: CPT

## 2021-03-25 PROCEDURE — 93000 ELECTROCARDIOGRAM COMPLETE: CPT

## 2021-03-25 PROCEDURE — 99204 OFFICE O/P NEW MOD 45 MIN: CPT

## 2021-03-26 ENCOUNTER — NON-APPOINTMENT (OUTPATIENT)
Age: 41
End: 2021-03-26

## 2021-03-26 ENCOUNTER — APPOINTMENT (OUTPATIENT)
Dept: PULMONOLOGY | Facility: CLINIC | Age: 41
End: 2021-03-26
Payer: COMMERCIAL

## 2021-03-26 ENCOUNTER — OUTPATIENT (OUTPATIENT)
Dept: OUTPATIENT SERVICES | Facility: HOSPITAL | Age: 41
LOS: 1 days | Discharge: HOME | End: 2021-03-26

## 2021-03-26 DIAGNOSIS — J91.0 MALIGNANT PLEURAL EFFUSION: ICD-10-CM

## 2021-03-26 DIAGNOSIS — J94.2 HEMOTHORAX: ICD-10-CM

## 2021-03-26 DIAGNOSIS — I31.3 PERICARDIAL EFFUSION (NONINFLAMMATORY): ICD-10-CM

## 2021-03-26 DIAGNOSIS — I95.1 ORTHOSTATIC HYPOTENSION: ICD-10-CM

## 2021-03-26 DIAGNOSIS — J98.11 ATELECTASIS: ICD-10-CM

## 2021-03-26 DIAGNOSIS — G89.29 OTHER CHRONIC PAIN: ICD-10-CM

## 2021-03-26 DIAGNOSIS — R06.02 SHORTNESS OF BREATH: ICD-10-CM

## 2021-03-26 DIAGNOSIS — M54.9 DORSALGIA, UNSPECIFIED: ICD-10-CM

## 2021-03-26 DIAGNOSIS — R65.10 SYSTEMIC INFLAMMATORY RESPONSE SYNDROME (SIRS) OF NON-INFECTIOUS ORIGIN WITHOUT ACUTE ORGAN DYSFUNCTION: ICD-10-CM

## 2021-03-26 DIAGNOSIS — C81.98 HODGKIN LYMPHOMA, UNSPECIFIED, LYMPH NODES OF MULTIPLE SITES: ICD-10-CM

## 2021-03-26 DIAGNOSIS — L29.9 PRURITUS, UNSPECIFIED: ICD-10-CM

## 2021-03-26 DIAGNOSIS — R00.0 TACHYCARDIA, UNSPECIFIED: ICD-10-CM

## 2021-03-26 DIAGNOSIS — F41.9 ANXIETY DISORDER, UNSPECIFIED: ICD-10-CM

## 2021-03-26 PROCEDURE — 99213 OFFICE O/P EST LOW 20 MIN: CPT | Mod: GC

## 2021-03-26 NOTE — REVIEW OF SYSTEMS
[Fever] : fever [Fatigue] : fatigue [Chills] : chills [Recent Wt Loss (___ Lbs)] : ~T recent [unfilled] lb weight loss [Dyspnea] : dyspnea [Pleuritic Pain] : pleuritic pain [Negative] : Endocrine

## 2021-03-27 LAB
CULTURE RESULTS: SIGNIFICANT CHANGE UP
SPECIMEN SOURCE: SIGNIFICANT CHANGE UP

## 2021-03-27 NOTE — PROCEDURE
[FreeTextEntry1] : bedside thoracic sono\par tiny left pleural effusion\par small-moderate right pleural effusion

## 2021-03-27 NOTE — ASSESSMENT
[FreeTextEntry1] : 41 yo F comes for evaluation of pleural effusion. She was diagnosed with Hodgkin lymphoma 9/2020, received one dose of chemo in [place] and had an anaphylactic reaction. She has been having B symptoms and in February 2021 started having dyspnea and was hospitalized for large pleural effusion. She underwent two thoracentesis and the fluid reaccumulated rapidly so pleurx catheter was placed on 3/5/21.\par \par # Recurrent exudative left pleural effusion likely malignant in setting of active lymphoma though cytology did not reveal malignant cells. s/p Left pleurx catheter. Today bedside sono noted small left pleural effusion, moderate right effusion. She had a syncopal episode last week following drainage and i told her to go to ED, work up showed large pericardial effusion without tamponade physiology, CTS cleared her for discharge. Will do drainage every other day, patient refuses daily drainage despite informing about chances of pleurodesis are greater with daily drainage. Repeat CXR shows improvement in left effusion with small PTX like from trapped lung. She is s/p 1 dose keytruda. RTC 1 month and will repeat bedside sono, for now will watch the right pleural effusion, suspect with continued treatment this effusion will resolve, if it continue to get bigger despite treatment will consider drainage.

## 2021-03-27 NOTE — HISTORY OF PRESENT ILLNESS
[TextBox_4] : 41 yo F comes for evaluation of pleural effusion. She was diagnosed with Hodgkin lymphoma 9/2020, received one dose of chemo in [place] and had an anaphylactic reaction. She has been having B symptoms and in February 2021 started having dyspnea and was hospitalized for large pleural effusion. She underwent two thoracentesis and the fluid reaccumulated rapidly so pleurx catheter was placed on 3/5/21. She has been having VNS drain 500cc daily for the past week. Yesterday nurse came and drained only a little fluid, she noted some air and patient had pain.\par \par medical: Hodgkin lymphoma diagnosed sept 2020 received 1 dose of chemo had allergic reaction [bronchospasm]. Biopsy was done at [place] of the chest wall lesion.\par surgeries: c section, pleurx catheter \par family: HTN in parents\par social: former smoker, 3 PYs quit at 23, no etoh or illicit drugs, works for [employer] company, works from home, no exposure to inorganic dusts, no pets\par \par Interim hx:\par \par pleurx is draining less, VN coming every other day, she got first dose of keytruda, feels improved constitutional symptoms. She has right pleuritic chest pain, some dyspnea.

## 2021-03-27 NOTE — PHYSICAL EXAM
[No Acute Distress] : no acute distress [Normal Oropharynx] : normal oropharynx [Normal Appearance] : normal appearance [No Neck Mass] : no neck mass [Normal Rate/Rhythm] : normal rate/rhythm [Normal S1, S2] : normal s1, s2 [No Murmurs] : no murmurs [No Resp Distress] : no resp distress [Clear to Auscultation Bilaterally] : clear to auscultation bilaterally [No Abnormalities] : no abnormalities [Benign] : benign [Normal Gait] : normal gait [No Clubbing] : no clubbing [No Cyanosis] : no cyanosis [No Edema] : no edema [FROM] : FROM [Normal Color/ Pigmentation] : normal color/ pigmentation [No Focal Deficits] : no focal deficits [Oriented x3] : oriented x3 [Normal Affect] : normal affect [TextBox_68] : reduced breath sounds at right and left lung base, left chest wall chest tube with dressing in tact, surgical site is clean

## 2021-03-28 NOTE — HISTORY OF PRESENT ILLNESS
[FreeTextEntry1] : 40 year-old female referred for evaluation of pericardial effusion.\par \par No cardiac history.\par Risk factors include former smoking.\par \par Notable comorbidities include Hodgkin lymphoma.  Diagnosed 9/2020.  Symptoms included cough, dyspnea, and B-symptoms.  Found to have lymphadenopathy and mediastinal mass with left pleural effusion s/p thoracentesis.  Initially opted for holistic therapy in [place], where she received 1 half-dose treatment with ABVD chemotherapy apparently complicated by anaphylactic reaction.  Now pursuing treatment with immunotherapy.\par \par Hospitalized last month with recurrent symptoms and left pleural effusion s/p thoracentesis / subsequent Pleurx.  Hospitalized again last week after episode of syncope while Pleurx was being drained by home care nurse.  Diagnosed with large pericardial effusion.  Evaluated by Dr. Lopez.  Apparently clinically stable and discharged with plan for repeat ECHO next week.\par \par Stable since discharge.  Exertional dyspnea.  Breathing relatively comfortable at rest.  No recurrent syncope.\par \par ECHO (3/18/21): nL LVSF.  Mild TR.  Large pericardial effusion.

## 2021-03-28 NOTE — DISCUSSION/SUMMARY
[FreeTextEntry1] : ECHO next week as planned.\par Follow-up with Dr. Lopez / pulmonary / oncology.\par Follow-up 3-months (pending ECHO results).

## 2021-03-28 NOTE — ASSESSMENT
[FreeTextEntry1] : Syncope possibly vagal.\par Less likely hemodynamic consequence of pericardial effusion given lack of recurrent symptoms despite ambulation.\par \par Large malignant pericardial effusion.\par BP baseline.\par No clinical tamponade.

## 2021-03-28 NOTE — HISTORY OF PRESENT ILLNESS
[FreeTextEntry1] : 40 year-old female referred for evaluation of pericardial effusion.\par \par No cardiac history.\par Risk factors include former smoking.\par \par Notable comorbidities include Hodgkin lymphoma.  Diagnosed 9/2020.  Symptoms included cough, dyspnea, and B-symptoms.  Found to have lymphadenopathy and mediastinal mass with left pleural effusion s/p thoracentesis.  Initially opted for holistic therapy in [place], where she received 1 half-dose treatment with ABVD chemotherapy apparently complicated by anaphylactic reaction.  Now pursuing treatment with immunotherapy.\par \par Hospitalized last month with recurrent symptoms and left pleural effusion s/p thoracentesis / subsequent Pleurx.  Hospitalized again last week after episode of syncope while Pleurx was being drained by home care nurse.  Diagnosed with large pericardial effusion.  Evaluated by Dr. Lopez.  Apparently clinically stable and discharged with plan for repeat ECHO next week.\par \par Stable since discharge.  Exertional dyspnea.  Breathing relatively comfortable at rest.  No recurrent syncope.\par \par ECHO (3/18/21): nL LVSF.  Mild TR.  Large pericardial effusion.

## 2021-03-29 ENCOUNTER — APPOINTMENT (OUTPATIENT)
Dept: CARDIOLOGY | Facility: CLINIC | Age: 41
End: 2021-03-29
Payer: MEDICAID

## 2021-03-29 PROCEDURE — 93306 TTE W/DOPPLER COMPLETE: CPT

## 2021-03-29 PROCEDURE — 99072 ADDL SUPL MATRL&STAF TM PHE: CPT

## 2021-03-30 ENCOUNTER — APPOINTMENT (OUTPATIENT)
Dept: CARDIOTHORACIC SURGERY | Facility: CLINIC | Age: 41
End: 2021-03-30
Payer: COMMERCIAL

## 2021-03-30 ENCOUNTER — NON-APPOINTMENT (OUTPATIENT)
Age: 41
End: 2021-03-30

## 2021-03-30 VITALS
WEIGHT: 126 LBS | OXYGEN SATURATION: 97 % | SYSTOLIC BLOOD PRESSURE: 97 MMHG | HEART RATE: 105 BPM | HEIGHT: 62 IN | DIASTOLIC BLOOD PRESSURE: 64 MMHG | TEMPERATURE: 97.4 F | RESPIRATION RATE: 16 BRPM | BODY MASS INDEX: 23.19 KG/M2

## 2021-03-30 PROCEDURE — 99212 OFFICE O/P EST SF 10 MIN: CPT

## 2021-03-30 PROCEDURE — 99072 ADDL SUPL MATRL&STAF TM PHE: CPT

## 2021-03-30 NOTE — HISTORY OF PRESENT ILLNESS
[FreeTextEntry1] : Mrs. Leisa Brunson is a 40 year old F patient with Hx of Hodgkin lymphoma ( Active, s/p one dose of ABVD ) was sent to the ED by her Pulmonologist Dr Fitch after pt stated she fainted on Jorge 3/14. Patient also endorses a non radiating pleuritic chest pain on the right sternal border that increases with cough and deep breaths. Pt has also experienced generalized back pain and spinal tenderness to palpation. She also had a temp of 101 on 3/15 and chills and has been coughing a lot more in the last few days.\par \par Pt was recently discharged 2 weeks ago. S/P pleurix catheter placed on 3/5/21. \par \par Pt diagnosed with Hodgkin lymphoma back in Aug 2020 when she had similar presentation. Patient used to follow up with Dr. Schultz but after receiving the first dose of the ABVD she developed a "Bad allergic reaction" and switched completely to holistic medicine and has been off any chemotherapy since then. \par \par CT chest and ECHO positive for large pericardial effusion, no intervention. Pt advised to repeat \par ECHO in 1 week and f/u in office. \par \par \par

## 2021-03-30 NOTE — REASON FOR VISIT
[Follow-Up: _____] : a [unfilled] follow-up visit [FreeTextEntry1] : f/u with echo for pericardial effusion, s/p Pleurix on 3/5/21

## 2021-03-30 NOTE — ASSESSMENT
[FreeTextEntry1] : Mrs. Leisa Brunson is a 40 year old F patient with Hx of Hodgkin lymphoma ( Active, s/p one dose of ABVD) , S/P pleurix catheter placed on 3/5/21, Was sent to the ED by her Pulmonologist Dr Fitch after pt stated she fainted on [place] 3/14. Patient also endorses a non radiating pleuritic chest pain on the right sternal border that increases with cough and deep breaths. CT chest and ECHO positive for large pericardial effusion, no intervention. Pt advised to repeat ECHO in 1 week and f/u in office. \par \par Pt present to office today states she feels overall well\par Denies CP, Palpitations. Occasional SOB. Reports improvement in breathing, able to tolerate walking. \par Pleurex drain inplace, decrease in drainage amount/ Site is C/D/I  - Draining. VNS drains every other day\par \par Plan:\par Drain 2x weekly \par CXR and Echocardiogram reviewed with patient and family member \par F/U in 4-6 weeks with CT scan\par I, Nestor Lopez saw, examined and reviewed the diagnostic images on patient:  LEISA BRUNSON on 03/30/2021 and agreed with my Nurse Practitioner's clinical note, physical exam findings and treatment plan.  Improving shortness of breath, echo with moderate pericardial effusion (previously large) with no evidence of tamponade, CXR small right basal pleural effusion.  No surgical intervention indicated at this time.  Continue with chemotherapy treatment, f/u in 4-6 weeks with CT chest to assess mediasstinal tumor, pericardial and pleural effusions.\par \par \par \par \par \par \par \par \par \par \par

## 2021-03-31 LAB
CULTURE RESULTS: SIGNIFICANT CHANGE UP
SPECIMEN SOURCE: SIGNIFICANT CHANGE UP

## 2021-04-03 LAB
CULTURE RESULTS: SIGNIFICANT CHANGE UP
SPECIMEN SOURCE: SIGNIFICANT CHANGE UP

## 2021-04-09 ENCOUNTER — NON-APPOINTMENT (OUTPATIENT)
Age: 41
End: 2021-04-09

## 2021-04-14 ENCOUNTER — NON-APPOINTMENT (OUTPATIENT)
Age: 41
End: 2021-04-14

## 2021-04-16 ENCOUNTER — APPOINTMENT (OUTPATIENT)
Dept: PULMONOLOGY | Facility: CLINIC | Age: 41
End: 2021-04-16
Payer: MEDICAID

## 2021-04-16 ENCOUNTER — OUTPATIENT (OUTPATIENT)
Dept: OUTPATIENT SERVICES | Facility: HOSPITAL | Age: 41
LOS: 1 days | Discharge: HOME | End: 2021-04-16

## 2021-04-16 VITALS
HEART RATE: 90 BPM | DIASTOLIC BLOOD PRESSURE: 65 MMHG | HEIGHT: 62 IN | BODY MASS INDEX: 24.11 KG/M2 | SYSTOLIC BLOOD PRESSURE: 105 MMHG | WEIGHT: 131 LBS | TEMPERATURE: 97.6 F | OXYGEN SATURATION: 97 %

## 2021-04-16 PROCEDURE — 99213 OFFICE O/P EST LOW 20 MIN: CPT | Mod: GC

## 2021-04-16 NOTE — ASSESSMENT
[FreeTextEntry1] : 39 yo F comes for evaluation of pleural effusion. She was diagnosed with Hodgkin lymphoma 9/2020, received one dose of chemo in [place] and had an anaphylactic reaction. She has been having B symptoms and in February 2021 started having dyspnea and was hospitalized for large pleural effusion. She underwent two thoracentesis and the fluid reaccumulated rapidly so pleurx catheter was placed on 3/5/21.\par \par # Recurrent exudative left pleural effusion likely malignant in setting of active lymphoma though cytology did not reveal malignant cells. s/p Left pleurx catheter. s/p 2 doses of keytruda. Very little output from drain over the last 2 weeks,will continue twice weekly drainage, fup 3 weeks and will do CXR before next visit,  if still minimal to no drainage and no significant effusion on imaging will consider removing the catheter. She recently had CT chest 4/2021, asked for her to send disc into clinic. need [place] biopsy reports. RTC 1 mo

## 2021-04-16 NOTE — PHYSICAL EXAM
[No Acute Distress] : no acute distress [Normal Oropharynx] : normal oropharynx [Normal Appearance] : normal appearance [No Neck Mass] : no neck mass [Normal Rate/Rhythm] : normal rate/rhythm [Normal S1, S2] : normal s1, s2 [No Murmurs] : no murmurs [No Resp Distress] : no resp distress [Clear to Auscultation Bilaterally] : clear to auscultation bilaterally [No Abnormalities] : no abnormalities [Benign] : benign [Normal Gait] : normal gait [No Clubbing] : no clubbing [No Cyanosis] : no cyanosis [No Edema] : no edema [FROM] : FROM [Normal Color/ Pigmentation] : normal color/ pigmentation [No Focal Deficits] : no focal deficits [Oriented x3] : oriented x3 [Normal Affect] : normal affect [TextBox_68] : reduced breath sounds at right and left lung base, left chest wall chest tube with dressing in tact, surgical site is clean, suture in place was removed

## 2021-04-16 NOTE — HISTORY OF PRESENT ILLNESS
[TextBox_4] : 39 yo F comes for evaluation of pleural effusion. She was diagnosed with Hodgkin lymphoma 9/2020, received one dose of chemo in [place] and had an anaphylactic reaction. She has been having B symptoms and in February 2021 started having dyspnea and was hospitalized for large pleural effusion. She underwent two thoracentesis and the fluid reaccumulated rapidly so pleurx catheter was placed on 3/5/21. She has been having VNS drain 500cc daily for the past week. Yesterday nurse came and drained only a little fluid, she noted some air and patient had pain.\par \par medical: Hodgkin lymphoma diagnosed sept 2020 received 1 dose of chemo had allergic reaction [bronchospasm]. Biopsy was done at [facility] of the chest wall lesion.\par surgeries: c section, pleurx catheter \par family: HTN in parents\par social: former smoker, 3 PYs quit at 23, no etoh or illicit drugs, works for [employer], works from home, no exposure to inorganic dusts, no pets\par \par Interim hx:\par \par s/p 2 doses of keytruda, says the last 4 drainage sessions she had minimal to no drainage, some discomfort at site of drain

## 2021-04-23 ENCOUNTER — APPOINTMENT (OUTPATIENT)
Dept: PULMONOLOGY | Facility: CLINIC | Age: 41
End: 2021-04-23

## 2021-04-26 DIAGNOSIS — J90 PLEURAL EFFUSION, NOT ELSEWHERE CLASSIFIED: ICD-10-CM

## 2021-04-27 ENCOUNTER — NON-APPOINTMENT (OUTPATIENT)
Age: 41
End: 2021-04-27

## 2021-05-06 ENCOUNTER — OUTPATIENT (OUTPATIENT)
Dept: OUTPATIENT SERVICES | Facility: HOSPITAL | Age: 41
LOS: 1 days | Discharge: HOME | End: 2021-05-06
Payer: COMMERCIAL

## 2021-05-06 DIAGNOSIS — J90 PLEURAL EFFUSION, NOT ELSEWHERE CLASSIFIED: ICD-10-CM

## 2021-05-06 PROCEDURE — 71046 X-RAY EXAM CHEST 2 VIEWS: CPT | Mod: 26

## 2021-05-07 ENCOUNTER — OUTPATIENT (OUTPATIENT)
Dept: OUTPATIENT SERVICES | Facility: HOSPITAL | Age: 41
LOS: 1 days | Discharge: HOME | End: 2021-05-07

## 2021-05-07 ENCOUNTER — APPOINTMENT (OUTPATIENT)
Dept: PULMONOLOGY | Facility: CLINIC | Age: 41
End: 2021-05-07
Payer: COMMERCIAL

## 2021-05-07 VITALS
WEIGHT: 135 LBS | DIASTOLIC BLOOD PRESSURE: 77 MMHG | OXYGEN SATURATION: 99 % | HEART RATE: 82 BPM | TEMPERATURE: 97.5 F | HEIGHT: 62 IN | BODY MASS INDEX: 24.84 KG/M2 | SYSTOLIC BLOOD PRESSURE: 109 MMHG

## 2021-05-07 DIAGNOSIS — J90 PLEURAL EFFUSION, NOT ELSEWHERE CLASSIFIED: ICD-10-CM

## 2021-05-07 DIAGNOSIS — R09.1 PLEURISY: ICD-10-CM

## 2021-05-07 PROCEDURE — 99213 OFFICE O/P EST LOW 20 MIN: CPT | Mod: GC

## 2021-05-08 LAB
CULTURE RESULTS: SIGNIFICANT CHANGE UP
SPECIMEN SOURCE: SIGNIFICANT CHANGE UP

## 2021-05-08 NOTE — HISTORY OF PRESENT ILLNESS
[TextBox_4] : 41 yo F comes for evaluation of pleural effusion. She was diagnosed with Hodgkin lymphoma 9/2020, received one dose of chemo in [place] and had an anaphylactic reaction. She has been having B symptoms and in February 2021 started having dyspnea and was hospitalized for large pleural effusion. She underwent two thoracentesis and the fluid reaccumulated rapidly so pleurx catheter was placed on 3/5/21. She has been having VNS drain 500cc daily for the past week. Yesterday nurse came and drained only a little fluid, she noted some air and patient had pain.\par \par medical: Hodgkin lymphoma diagnosed sept 2020 received 1 dose of chemo had allergic reaction [bronchospasm]. Biopsy was done at [facility] of the chest wall lesion.\par surgeries: c section, pleurx catheter \par family: HTN in parents\par social: former smoker, 3 PYs quit at 23, no etoh or illicit drugs, works for [employer] company, works from home, no exposure to inorganic dusts, no pets\par \par appt 5/7/21\par \par patient feels better, no pulmonary symptoms, the pleurx has not drained for the past month,  nurse comes 2x per week and there is no drinaage, cxr done yesterday trace left effusion, s/p keytrude 3 doses last was 1 week ago. she no longer has b symptoms

## 2021-05-08 NOTE — REVIEW OF SYSTEMS
[Recent Wt Loss (___ Lbs)] : ~T recent [unfilled] lb weight loss [Pleuritic Pain] : pleuritic pain [Negative] : Endocrine [TextBox_30] : intermittent chest pains

## 2021-05-08 NOTE — PHYSICAL EXAM
[No Acute Distress] : no acute distress [Normal Oropharynx] : normal oropharynx [Normal Appearance] : normal appearance [No Neck Mass] : no neck mass [Normal Rate/Rhythm] : normal rate/rhythm [Normal S1, S2] : normal s1, s2 [No Murmurs] : no murmurs [No Resp Distress] : no resp distress [Clear to Auscultation Bilaterally] : clear to auscultation bilaterally [No Abnormalities] : no abnormalities [Benign] : benign [Normal Gait] : normal gait [No Clubbing] : no clubbing [No Cyanosis] : no cyanosis [No Edema] : no edema [FROM] : FROM [Normal Color/ Pigmentation] : normal color/ pigmentation [No Focal Deficits] : no focal deficits [Oriented x3] : oriented x3 [Normal Affect] : normal affect [TextBox_11] : cervical and clavicular palpable nontender lymph nodes [TextBox_68] : left chest wall chest tube with dressing in tact, surgical site is clean

## 2021-05-08 NOTE — ASSESSMENT
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 She initially had large left pleural and pericardial effusions which have essentially resolved in response to treatment. Her anterior mediastinal clyde mass/conglomerate has reduced in size. She is s/p 3 doses of keytruda with favorable response patient reports from oncologist dr persaud. She refused traditional chemo. Her pleural fluid cytology did not reveal malignant cells. She is s/p Left pleurx catheter for recurrent left exudative pleural effusion. No pulmonary symptoms. No output from drain over the last 4 weeks, CXR shows trace left effusion. Will schedule removal of pleurx catheter. RTC afterwards.

## 2021-05-11 ENCOUNTER — NON-APPOINTMENT (OUTPATIENT)
Age: 41
End: 2021-05-11

## 2021-05-14 ENCOUNTER — NON-APPOINTMENT (OUTPATIENT)
Age: 41
End: 2021-05-14

## 2021-05-24 ENCOUNTER — RESULT CHARGE (OUTPATIENT)
Age: 41
End: 2021-05-24

## 2021-05-24 ENCOUNTER — APPOINTMENT (OUTPATIENT)
Dept: CARDIOLOGY | Facility: CLINIC | Age: 41
End: 2021-05-24
Payer: COMMERCIAL

## 2021-05-24 VITALS
BODY MASS INDEX: 25.03 KG/M2 | TEMPERATURE: 98.2 F | SYSTOLIC BLOOD PRESSURE: 100 MMHG | WEIGHT: 136 LBS | DIASTOLIC BLOOD PRESSURE: 68 MMHG | HEART RATE: 67 BPM | HEIGHT: 62 IN

## 2021-05-24 DIAGNOSIS — R55 SYNCOPE AND COLLAPSE: ICD-10-CM

## 2021-05-24 PROCEDURE — 99072 ADDL SUPL MATRL&STAF TM PHE: CPT

## 2021-05-24 PROCEDURE — 99213 OFFICE O/P EST LOW 20 MIN: CPT

## 2021-05-24 PROCEDURE — 93000 ELECTROCARDIOGRAM COMPLETE: CPT

## 2021-05-24 RX ORDER — ALPRAZOLAM 0.25 MG/1
0.25 TABLET ORAL DAILY
Refills: 0 | Status: DISCONTINUED | COMMUNITY
End: 2021-05-24

## 2021-05-24 RX ORDER — SERTRALINE HYDROCHLORIDE 25 MG/1
25 TABLET, FILM COATED ORAL DAILY
Refills: 0 | Status: DISCONTINUED | COMMUNITY
End: 2021-05-24

## 2021-05-25 ENCOUNTER — LABORATORY RESULT (OUTPATIENT)
Age: 41
End: 2021-05-25

## 2021-05-25 ENCOUNTER — OUTPATIENT (OUTPATIENT)
Dept: OUTPATIENT SERVICES | Facility: HOSPITAL | Age: 41
LOS: 1 days | Discharge: HOME | End: 2021-05-25

## 2021-05-25 DIAGNOSIS — Z11.59 ENCOUNTER FOR SCREENING FOR OTHER VIRAL DISEASES: ICD-10-CM

## 2021-05-28 ENCOUNTER — OUTPATIENT (OUTPATIENT)
Dept: OUTPATIENT SERVICES | Facility: HOSPITAL | Age: 41
LOS: 1 days | Discharge: HOME | End: 2021-05-28

## 2021-05-28 ENCOUNTER — NON-APPOINTMENT (OUTPATIENT)
Age: 41
End: 2021-05-28

## 2021-05-28 VITALS
HEART RATE: 67 BPM | DIASTOLIC BLOOD PRESSURE: 67 MMHG | TEMPERATURE: 98 F | RESPIRATION RATE: 18 BRPM | HEIGHT: 63 IN | WEIGHT: 136.03 LBS | SYSTOLIC BLOOD PRESSURE: 93 MMHG

## 2021-05-28 RX ORDER — SERTRALINE 25 MG/1
0.5 TABLET, FILM COATED ORAL
Qty: 0 | Refills: 0 | DISCHARGE

## 2021-05-28 NOTE — PROCEDURE NOTE - ADDITIONAL PROCEDURE DETAILS
Patient placed on supine position, Using 2% lidocaine, the skin surrounding the exit site of the catheter was anesthetized. Suture removal scissors were then used to bluntly dissect around the pleurx cuff. The catheter was then removed. Minimal bleeding. Dressing applied. Fup in 4 weeks. CXR prior to visit.

## 2021-05-28 NOTE — ASU PREOP CHECKLIST - BMI (KG/M2)
Patient should make sure to follow-up with oncologist regarding PSA testing. Regarding his blood pressures, patient should continue to monitor and would recommend office visit sometime this month.   Used to be on lisinopril in the past.  Was discontinued l 24.1

## 2021-05-28 NOTE — ASU DISCHARGE PLAN (ADULT/PEDIATRIC) - CARE PROVIDER_API CALL
Sebastian Fitch)  Critical Care Medicine; Pulmonary Disease; Sleep Medicine  [address] [place] [address]  Phone: [phone]  Fax: [phone]  Follow Up Time:

## 2021-06-08 DIAGNOSIS — Z48.03 ENCOUNTER FOR CHANGE OR REMOVAL OF DRAINS: ICD-10-CM

## 2021-06-10 ENCOUNTER — OUTPATIENT (OUTPATIENT)
Dept: OUTPATIENT SERVICES | Facility: HOSPITAL | Age: 41
LOS: 1 days | Discharge: HOME | End: 2021-06-10
Payer: COMMERCIAL

## 2021-06-10 DIAGNOSIS — J90 PLEURAL EFFUSION, NOT ELSEWHERE CLASSIFIED: ICD-10-CM

## 2021-06-10 PROCEDURE — 71046 X-RAY EXAM CHEST 2 VIEWS: CPT | Mod: 26

## 2021-06-11 ENCOUNTER — APPOINTMENT (OUTPATIENT)
Dept: PULMONOLOGY | Facility: CLINIC | Age: 41
End: 2021-06-11
Payer: COMMERCIAL

## 2021-06-11 ENCOUNTER — OUTPATIENT (OUTPATIENT)
Dept: OUTPATIENT SERVICES | Facility: HOSPITAL | Age: 41
LOS: 1 days | Discharge: HOME | End: 2021-06-11

## 2021-06-11 VITALS
HEIGHT: 62 IN | OXYGEN SATURATION: 99 % | DIASTOLIC BLOOD PRESSURE: 71 MMHG | WEIGHT: 138 LBS | BODY MASS INDEX: 25.4 KG/M2 | TEMPERATURE: 97.8 F | HEART RATE: 82 BPM | SYSTOLIC BLOOD PRESSURE: 103 MMHG

## 2021-06-11 DIAGNOSIS — J90 PLEURAL EFFUSION, NOT ELSEWHERE CLASSIFIED: ICD-10-CM

## 2021-06-11 PROCEDURE — 99213 OFFICE O/P EST LOW 20 MIN: CPT | Mod: GC

## 2021-06-11 NOTE — PHYSICAL EXAM
[No Acute Distress] : no acute distress [Normal Oropharynx] : normal oropharynx [Normal Appearance] : normal appearance [No Neck Mass] : no neck mass [Normal Rate/Rhythm] : normal rate/rhythm [Normal S1, S2] : normal s1, s2 [No Murmurs] : no murmurs [No Resp Distress] : no resp distress [Clear to Auscultation Bilaterally] : clear to auscultation bilaterally [No Abnormalities] : no abnormalities [Benign] : benign [Normal Gait] : normal gait [No Clubbing] : no clubbing [No Cyanosis] : no cyanosis [No Edema] : no edema [FROM] : FROM [Normal Color/ Pigmentation] : normal color/ pigmentation [No Focal Deficits] : no focal deficits [Oriented x3] : oriented x3 [Normal Affect] : normal affect [TextBox_11] : cervical and clavicular palpable nontender lymph nodes [TextBox_68] : left chest wall scar, clean and intact, no pleurx

## 2021-06-11 NOTE — HISTORY OF PRESENT ILLNESS
[TextBox_4] : 41 yo F comes for evaluation of pleural effusion. She was diagnosed with Hodgkin lymphoma 9/2020, received one dose of chemo in [place] and had an anaphylactic reaction. She has been having B symptoms and in February 2021 started having dyspnea and was hospitalized for large pleural effusion. She underwent two thoracentesis and the fluid reaccumulated rapidly so pleurx catheter was placed on 3/5/21. She has been having VNS drain 500cc daily for the past week. Yesterday nurse came and drained only a little fluid, she noted some air and patient had pain.\par \par medical: Hodgkin lymphoma diagnosed sept 2020 received 1 dose of chemo had allergic reaction [bronchospasm]. Biopsy was done at [facility] of the chest wall lesion.\par surgeries: c section, pleurx catheter \par family: HTN in parents\par social: former smoker, 3 PYs quit at 23, no etoh or illicit drugs, works for [employer] company, works from home, no exposure to inorganic dusts, no pets\par \par appt 5/7/21\par \par patient feels better, no pulmonary symptoms, the pleurx has not drained for the past month,  nurse comes 2x per week and there is no drinaage, cxr done yesterday trace left effusion, s/p keytrude 3 doses last was 1 week ago. she no longer has b symptoms\par \par appt 6/11/21\par feels well, no pulmonary complaints, getting immunotherapy today, last dose about 6 weeks ago, pleurx removed 1 month ago

## 2021-06-11 NOTE — ASSESSMENT
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 She initially had large left pleural and pericardial effusions which have essentially resolved in response to treatment. Her anterior mediastinal clyde mass/conglomerate has reduced in size. She is s/p 3 doses of keytruda with favorable response patient reports from oncologist dr persaud. She refused traditional chemo. Her pleural fluid cytology did not reveal malignant cells. She is s/p Left pleurx catheter for recurrent left exudative pleural effusion. He effusion improved and she achieved spontaneous pleurodesis. The pleurx was removed 4 weeks ago and fup CXR is normal. She denies pulmonary symptoms. She can fup on PRN basis.

## 2021-06-12 ENCOUNTER — TRANSCRIPTION ENCOUNTER (OUTPATIENT)
Age: 41
End: 2021-06-12

## 2021-06-19 PROBLEM — R55 SYNCOPE: Status: ACTIVE | Noted: 2021-03-28

## 2021-06-19 NOTE — ASSESSMENT
[FreeTextEntry1] : Prior syncope possibly vagal.\par No recurrence.\par \par Malignant pericardial effusion.\par Stable on follow-up ECHO.\par No clinical tamponade.\par \par ECHO (3/29/21): nL LVSF.  Moderate circumferential pericardial effusion (largest at RA).  No tamponade.

## 2021-06-19 NOTE — HISTORY OF PRESENT ILLNESS
[FreeTextEntry1] : 40 year-old female referred for evaluation of pericardial effusion.\par \par No cardiac history.\par Risk factors include former smoking.\par \par Notable comorbidities include Hodgkin lymphoma.  Diagnosed 9/2020.  Symptoms included cough, dyspnea, and B-symptoms.  Found to have lymphadenopathy and mediastinal mass with left pleural effusion s/p thoracentesis.  Initially opted for holistic therapy in [place], where she received 1 half-dose treatment with ABVD chemotherapy apparently complicated by anaphylactic reaction.  Now pursuing treatment with immunotherapy.\par \par Hospitalized last month with recurrent symptoms and left pleural effusion s/p thoracentesis / subsequent Pleurx.  Hospitalized again last week after episode of syncope while Pleurx was being drained by home care nurse.  Diagnosed with large pericardial effusion.  Evaluated by Dr. oLpez.  Apparently clinically stable and discharged with plan for repeat ECHO next week.\par \par Stable since discharge.  Exertional dyspnea.  Breathing relatively comfortable at rest.  No recurrent syncope.\par \par ECHO (3/18/21): nL LVSF.  Mild TR.  Large pericardial effusion.

## 2021-06-19 NOTE — DISCUSSION/SUMMARY
[FreeTextEntry1] : Follow-up pulmonary / oncology.\par Light activity as tolerated.\par Follow-up 3-months.

## 2021-07-08 ENCOUNTER — APPOINTMENT (OUTPATIENT)
Dept: CARDIOLOGY | Facility: CLINIC | Age: 41
End: 2021-07-08
Payer: COMMERCIAL

## 2021-07-08 PROCEDURE — 99072 ADDL SUPL MATRL&STAF TM PHE: CPT

## 2021-07-08 PROCEDURE — 93306 TTE W/DOPPLER COMPLETE: CPT

## 2021-07-09 ENCOUNTER — OUTPATIENT (OUTPATIENT)
Dept: OUTPATIENT SERVICES | Facility: HOSPITAL | Age: 41
LOS: 1 days | Discharge: HOME | End: 2021-07-09
Payer: COMMERCIAL

## 2021-07-09 DIAGNOSIS — C81.10 NODULAR SCLEROSIS HODGKIN LYMPHOMA, UNSPECIFIED SITE: ICD-10-CM

## 2021-07-09 LAB — GLUCOSE BLDC GLUCOMTR-MCNC: 88 MG/DL — SIGNIFICANT CHANGE UP (ref 70–99)

## 2021-07-09 PROCEDURE — 78815 PET IMAGE W/CT SKULL-THIGH: CPT | Mod: 26,PS

## 2021-08-27 ENCOUNTER — APPOINTMENT (OUTPATIENT)
Dept: CARDIOLOGY | Facility: CLINIC | Age: 41
End: 2021-08-27

## 2022-01-01 NOTE — ED PROVIDER NOTE - CCCP TRG CHIEF CMPLNT
shortness of breath Site: Sternum (15 Oct 2022 20:30)  Bilirubin: 7.1 (15 Oct 2022 20:30)  Bilirubin: 5.4 (15 Oct 2022 15:20)  Site: Sternum (15 Oct 2022 15:20)   Site: Sternum (18 Oct 2022 22:05)  Bilirubin: 15.1 (18 Oct 2022 22:05)  Bilirubin Comment: Serum to be sent. (18 Oct 2022 22:05)  Bilirubin Comment: serum to be sent (18 Oct 2022 08:36)  Bilirubin: 15.3 (18 Oct 2022 08:36)  Site: Sternum (18 Oct 2022 08:36)  Site: Sternum (17 Oct 2022 19:52)  Bilirubin: 12.3 (17 Oct 2022 19:52)  Bilirubin Comment: serum to be sent (17 Oct 2022 19:52)  Bilirubin: 9.8 (16 Oct 2022 20:40)  Site: Sternum (16 Oct 2022 20:40)  Site: Sternum (15 Oct 2022 20:30)  Bilirubin: 7.1 (15 Oct 2022 20:30)  Bilirubin: 5.4 (15 Oct 2022 15:20)  Site: Sternum (15 Oct 2022 15:20)   Site: Sternum (20 Oct 2022 20:35)  Bilirubin: 16.4 (20 Oct 2022 20:35)  Bilirubin Comment: serum sent (20 Oct 2022 20:35)  Bilirubin Comment: Serum sent (19 Oct 2022 23:19)  Bilirubin: 15 (19 Oct 2022 23:19)  Site: Sternum (19 Oct 2022 23:19)  Site: Sternum (18 Oct 2022 22:05)  Bilirubin: 15.1 (18 Oct 2022 22:05)  Bilirubin Comment: Serum to be sent. (18 Oct 2022 22:05)  Bilirubin Comment: serum to be sent (18 Oct 2022 08:36)  Bilirubin: 15.3 (18 Oct 2022 08:36)  Site: Sternum (18 Oct 2022 08:36)  Site: Sternum (17 Oct 2022 19:52)  Bilirubin: 12.3 (17 Oct 2022 19:52)  Bilirubin Comment: serum to be sent (17 Oct 2022 19:52)  Bilirubin: 9.8 (16 Oct 2022 20:40)  Site: Sternum (16 Oct 2022 20:40)  Site: Sternum (15 Oct 2022 20:30)  Bilirubin: 7.1 (15 Oct 2022 20:30)  Bilirubin: 5.4 (15 Oct 2022 15:20)  Site: Sternum (15 Oct 2022 15:20)

## 2022-01-27 ENCOUNTER — NON-APPOINTMENT (OUTPATIENT)
Age: 42
End: 2022-01-27

## 2022-02-15 ENCOUNTER — APPOINTMENT (OUTPATIENT)
Dept: CARDIOTHORACIC SURGERY | Facility: CLINIC | Age: 42
End: 2022-02-15
Payer: COMMERCIAL

## 2022-02-15 PROCEDURE — 99212 OFFICE O/P EST SF 10 MIN: CPT | Mod: 95

## 2022-02-16 NOTE — HISTORY OF PRESENT ILLNESS
[Home] : at home, [unfilled] , at the time of the visit. [Medical Office: ([facility])___] : at the medical office located in  [Verbal consent obtained from patient] : the patient, [unfilled] [FreeTextEntry4] : Pt scheduled for telehealth and could not access [employer] d/t technical difficulties or lack of smart phone device [FreeTextEntry1] : Mrs. Leisa Brunson is a 41 year old F, diagnosed with Hodgkin lymphoma back in Aug 2020. March 2021 patient passed out and went to the ER for pleural effusion and a pericardial effusion.  The pericardial effusion was large, however no intervention was done at the time and it was followed with echocardiograms.  Also with Pleural Effusion S/P Pleurex Catheter placement at that time, followed with the pulmonary clinic ([facility]) with subsequent removed ~ May 2021 after she had improvement of effusion. Patient used to follow up with Dr. Schultz but after receiving the first dose of the ABVD she developed a "Bad allergic reaction" and switched completely to holistic medicine and has been off any chemotherapy since then. She has been following with Oncologist Dr. Anderson.  She had a recent PET/CT January of 2022 which showed progression of disease, but per reports just a mild pericardial effusion, discs unavailable at the time of visit.  Televisit for review of PET report and assessment of symptoms regarding her pericardial effusion.\par \par At this time, the patient isn't convinced that the PET/CT actually has progression of disease as she has been feeling better than ever, She states her breathing is significantly improved and has been doing better each day increasing her activity.\par ECOG 0\par \par Her healthcare teams is as follows:\par PMD: Dr. Smith\par Cardio: Dr. Santana\par Pulm: Dr. Fitch\par Hem//Onc: [place]--> Dr. Childress\par

## 2022-02-16 NOTE — DATA REVIEWED
[FreeTextEntry1] : PET/CT @ [facility] 1/27/2022:\par Impression:\par Findings suggest mild to moderate progression of lymphoma above the diaphragm in locations described above with increase activity noted as compared to the prior study of 9/21/2021.  \par A mild pericardial effusion is again seen.

## 2022-02-16 NOTE — ASSESSMENT
[FreeTextEntry1] : Mrs. Leisa Brunson is a 41 year old F, diagnosed with Hodgkin lymphoma August 2020.  March 2021 patient passed out and went to the ER for pleural effusion and a pericardial effusion.  The pericardial effusion was large, however no intervention was done at the time and it was followed with echocardiograms.  . Patient used to follow up with Dr. Schultz but after receiving the first dose of the ABVD she developed a "Bad allergic reaction" and switched completely to holistic medicine and has been off any chemotherapy since then. She has been following with Oncologist Dr. Anderson.  She had a recent PET/CT January of 2022 which showed progression of disease, but per reports just a mild pericardial effusion, discs unavailable at the time of visit.  Televisit for review of PET report and assessment of symptoms regarding her pericardial effusion.\par \par Plan:\par PET/CT report reviewed with patient via telephone visit, discs unavailable at the time of her visit\par WIll obtain her discs for review and comparison to prior imaging\par After imaging is reviewed will determine course of follow up\par If pericardial effusion stable will continue to follow up after her scans done with Oncologist\par She should continue follow up with her Pulmonologist- now establishing care with Dr. Fitch\par F/U with Dr. Childress for treatment of Lymphoma\par F/U with PMD for routine medical care\par \par I, Laura Bertrand [facility], am acting as scribe for Dr.Villa Lopez\par I, Nestor Lopez saw, examined and reviewed the diagnostic images on patient:  LEISA BRUNSON on 02/15/2022 and agreed with my Nurse Practitioner's clinical note, physical exam findings and treatment plan.\par Patient known to me for diagnosis of pericardial effusion secondary to Hodgkin lymphoma, recent PET/CT revealed progression of disease (images not available for me to review). Patient refers feeling well, no symptoms suggesting of recurrence of pericardial effusion. Will obtain CD to review images.

## 2022-03-09 ENCOUNTER — APPOINTMENT (OUTPATIENT)
Age: 42
End: 2022-03-09
Payer: COMMERCIAL

## 2022-03-09 VITALS
WEIGHT: 157 LBS | HEIGHT: 62 IN | DIASTOLIC BLOOD PRESSURE: 60 MMHG | HEART RATE: 82 BPM | OXYGEN SATURATION: 100 % | SYSTOLIC BLOOD PRESSURE: 120 MMHG | BODY MASS INDEX: 28.89 KG/M2 | RESPIRATION RATE: 12 BRPM

## 2022-03-09 DIAGNOSIS — C81.90 HODGKIN LYMPHOMA, UNSPECIFIED, UNSPECIFIED SITE: ICD-10-CM

## 2022-03-09 DIAGNOSIS — R59.0 LOCALIZED ENLARGED LYMPH NODES: ICD-10-CM

## 2022-03-09 DIAGNOSIS — J90 PLEURAL EFFUSION, NOT ELSEWHERE CLASSIFIED: ICD-10-CM

## 2022-03-09 PROCEDURE — 99213 OFFICE O/P EST LOW 20 MIN: CPT

## 2022-03-09 NOTE — PHYSICAL EXAM
[No Acute Distress] : no acute distress [Normal Oropharynx] : normal oropharynx [Normal Appearance] : normal appearance [No Neck Mass] : no neck mass [Normal Rate/Rhythm] : normal rate/rhythm [Normal S1, S2] : normal s1, s2 [No Murmurs] : no murmurs [No Resp Distress] : no resp distress [Clear to Auscultation Bilaterally] : clear to auscultation bilaterally [No Abnormalities] : no abnormalities [Benign] : benign [Normal Gait] : normal gait [No Clubbing] : no clubbing [No Cyanosis] : no cyanosis [No Edema] : no edema [FROM] : FROM [Normal Color/ Pigmentation] : normal color/ pigmentation [No Focal Deficits] : no focal deficits [Oriented x3] : oriented x3 [Normal Affect] : normal affect [TextBox_68] : left chest wall scar, clean and intact

## 2022-03-09 NOTE — ASSESSMENT
[FreeTextEntry1] : HO Hodgkin lymphoma currently on keytruda\par HO malignant Pleural effusion s/p successful pleurodesis with PleurX catheter \par Mediastinal LAD on recent PET-CT for IR guided FNA \par Former smoker (Quit ~15 years ago)

## 2022-03-09 NOTE — HISTORY OF PRESENT ILLNESS
[TextBox_4] : Pleural effusion : s/p succesful pleurodesis by Pleurx and followed by removal of Pleurx in 2021. Patient states she feels well and denies any no pulmonary complaints.\par Patient recently had a PET-CT at [facility] which revealed mediastinal LAD

## 2022-05-17 ENCOUNTER — APPOINTMENT (OUTPATIENT)
Dept: CARDIOTHORACIC SURGERY | Facility: CLINIC | Age: 42
End: 2022-05-17

## 2022-07-08 ENCOUNTER — APPOINTMENT (OUTPATIENT)
Dept: CARDIOLOGY | Facility: CLINIC | Age: 42
End: 2022-07-08

## 2022-07-08 VITALS
DIASTOLIC BLOOD PRESSURE: 68 MMHG | WEIGHT: 153 LBS | SYSTOLIC BLOOD PRESSURE: 114 MMHG | HEART RATE: 66 BPM | HEIGHT: 62 IN | BODY MASS INDEX: 28.16 KG/M2

## 2022-07-08 DIAGNOSIS — I31.3 PERICARDIAL EFFUSION (NONINFLAMMATORY): ICD-10-CM

## 2022-07-08 PROCEDURE — 99213 OFFICE O/P EST LOW 20 MIN: CPT

## 2022-07-08 PROCEDURE — 93000 ELECTROCARDIOGRAM COMPLETE: CPT

## 2022-07-08 NOTE — HISTORY OF PRESENT ILLNESS
[FreeTextEntry1] : 40 year-old female referred for evaluation of pericardial effusion.\par \par No cardiac history.\par Risk factors include former smoking.\par \par Notable comorbidities include Hodgkin lymphoma.  Diagnosed 9/2020.  Symptoms included cough, dyspnea, and B-symptoms.  Found to have lymphadenopathy and mediastinal mass with left pleural effusion s/p thoracentesis.  Initially opted for holistic therapy in [place], where she received 1 half-dose treatment with ABVD chemotherapy apparently complicated by anaphylactic reaction.  Now pursuing treatment with immunotherapy.\par \par Hospitalized last month with recurrent symptoms and left pleural effusion s/p thoracentesis / subsequent Pleurx.  Hospitalized again last week after episode of syncope while Pleurx was being drained by home care nurse.  Diagnosed with large pericardial effusion.  Evaluated by Dr. Lopez.  Apparently clinically stable and discharged with plan for repeat ECHO next week.\par \par Stable since discharge.  Exertional dyspnea.  Breathing relatively comfortable at rest.  No recurrent syncope.\par \par ECHO (3/18/21): nL LVSF.  Mild TR.  Large pericardial effusion.

## 2022-07-08 NOTE — ASSESSMENT
[FreeTextEntry1] : Malignant pericardial effusion.\par Improved.\par \par Reasonable functional capacity without cardiac symptoms.\par \par ECHO (7/8/21): nL chambers / biventricular function.  Trivial pericardial effusion.

## 2022-07-08 NOTE — DISCUSSION/SUMMARY
[FreeTextEntry1] : Obtain recent chest CT.\par ECHO if recurrent effusion.\par Regular oncology follow-up.

## 2022-07-27 NOTE — PRE-OP CHECKLIST - AS TEMP SITE
Impression: Age-related nuclear cataract, bilateral Plan: Remains stable. No treatment currently recommended due to level of vision. Continue to monitor.  RTC 1 year CE oral

## 2022-08-09 ENCOUNTER — APPOINTMENT (OUTPATIENT)
Dept: CARDIOTHORACIC SURGERY | Facility: CLINIC | Age: 42
End: 2022-08-09

## 2022-08-09 ENCOUNTER — NON-APPOINTMENT (OUTPATIENT)
Age: 42
End: 2022-08-09

## 2022-09-12 ENCOUNTER — APPOINTMENT (OUTPATIENT)
Age: 42
End: 2022-09-12

## 2022-09-28 ENCOUNTER — NON-APPOINTMENT (OUTPATIENT)
Age: 42
End: 2022-09-28

## 2022-10-04 NOTE — MEDICAL STUDENT PROGRESS NOTE(EDUCATION) - NS MD HP STUD ASPLAN PLAN FT
# Non Hodgkin's Lymphoma:   - Heme/Onc following: pleural effusion  most likely malignant, h/o newly diagnosed hodgkins disease, will review pathology ,and all imaging. Discussed with pt about out  plan for treatment to discuss treatment options  - Pt requesting to establish care with new oncologist for holistic care/alternative medicine     #left sided Pleural effusion:   - Diagnosed in Aug 2020 when she had similar symptoms.  - Pt found to have complete opacification of the left lung with deviation of the trachea to the right side on chest x-ray and CT. Pt underwent a thoracocentesis on 2/27/21 in the ED that meghna 570 mL and the next day, Pt had a repeat thoracocentesis that meghna 1500mL on 2/28/21.  - Pleural studies: no specimen grown, culture negative  - Pulm following: HOB at 45, Aspiration precautions, DVT ppx, FU with hem onc  - pt is saturating well on room air, no hypoxia    # Low Iron Levels  - Total Serum Iron: 29   - % iron saturation: 12   - iron infusion given  - will monitor iron level s/p infusion    # 40 year old F patient with Hx of Hodgkin lymphoma ( Active, S/p one dose of APVD ) admitted for worsening shortness of breath, drenching night sweats and occasional fever as well as  left sided non radiating pleuritic chest pain that increases with cough and deep breaths for the past 3 days.    # Non-Hodgking Lymphhoma  Diagnosed back in Aug 2020 when she had similar presentation  Used to follow up with Dr. Schultz but after receiving the first dose of the APVD she developed a "Bad allergic reaction" and switched completely to holistic medicine and has been off any chemotherapy since then.   - Will follow up with Dr. Childress outpatient to     - Xray which showed complete opacification of the Left lung with deviation of the trachea to the right side, patient underwent a thora in the ED to relieve the SOB but still endorse that she is short of breath and unable to speak in full sentences   - initially admitted to ICU but downgraded yesterday  - As per pulm:  - Keep HOB @ 45 degrees  - Follow up pleural effusion fluid analysis  - Might need repeat thoracocentesis    # Left sided pleural effusion  - Keep SpO2> 94%  - No need for ABX now    Diet: DASH  Activity: IAT  DVT Prophylaxis: Lovenox  CHG Order  Code Status: full  Disposition: from home    # Non Hodgkin's Lymphoma:   - Heme/Onc following: pleural effusion  most likely malignant, h/o newly diagnosed hodgkins disease, will review pathology ,and all imaging. Discussed with pt about out  plan for treatment to discuss treatment options  - Pt requesting to establish care with new oncologist for holistic care/alternative medicine     #left sided Pleural effusion:   - Diagnosed in Aug 2020 when she had similar symptoms.  - Pt found to have complete opacification of the left lung with deviation of the trachea to the right side on chest x-ray and CT. Pt underwent a thoracocentesis on 2/27/21 in the ED that meghna 570 mL and the next day, Pt had a repeat thoracocentesis that meghna 1500mL on 2/28/21.  - Pleural studies: no specimen grown, culture negative  - Pulm following: HOB at 45, Aspiration precautions, DVT ppx, FU with hem onc  - pt is saturating well on room air, no hypoxia    # Low Iron Levels  - Total Serum Iron: 29   - % iron saturation: 12   - iron infusion given  - will monitor iron level s/p infusion    # 40 year old F patient with Hx of Hodgkin lymphoma ( Active, S/p one dose of APVD ) admitted for worsening shortness of breath, drenching night sweats and occasional fever as well as  left sided non radiating pleuritic chest pain that increases with cough and deep breaths for the past 3 days.    # Left sided pleural effusion  Xray which showed complete opacification of the Left lung with deviation of the trachea to the right side  Thoracocentesis on 2/27/21 in the ED that meghna 570 mL and the next day, Pt had a repeat thoracocentesis that meghna 1500mL on 2/28/21  Initially admitted to ICU but downgraded yesterday  Pleural fluid analysis shows an exudative picture  - Outpatient pulm f/u  - Keep HOB @ 45 degrees  - Follow up pleural effusion cytology  - No need for ABX now  - Keep SpO2> 94%  - pt is saturating well on room air, no hypoxia  - Discharge tomorrow if stays stable  - Follow up chest xray from today    # Non-Hodgking Lymphhoma  Diagnosed back in Aug 2020 when she had similar presentation  Used to follow up with Dr. Schultz but after receiving the first dose of the APVD she developed a "Bad allergic reaction" and switched completely to holistic medicine and has been off any chemotherapy since then.   - Will follow up with Dr. Childress outpatient to start immunotherapy    # Abdominal pain  Hx of H pylori treated 2 months ago  - Follow up H pylori stool  - Tramadol PRN for pain; can increase to 50 if needed    # Iron deficiency anemia  Total Serum Iron: 29   % iron saturation: 12   Was told by OP PCP that needs transfusion  - Venlofer x1 today    Diet: DASH  Activity: IAT  DVT Prophylaxis: Lovenox  CHG Order  Code Status: full  Disposition: from home       40 year old F patient with Hx of Hodgkin lymphoma ( Active, S/p one dose of APVD ) admitted for worsening shortness of breath, drenching night sweats and occasional fever as well as  left sided non radiating pleuritic chest pain that increases with cough and deep breaths for the past 3 days.    # Left sided pleural effusion  Xray which showed complete opacification of the Left lung with deviation of the trachea to the right side  Thoracocentesis on 2/27/21 in the ED that meghna 570 mL and the next day, Pt had a repeat thoracocentesis that meghna 1500mL on 2/28/21  Initially admitted to ICU but downgraded yesterday  Pleural fluid analysis shows an exudative picture  - Outpatient pulm f/u  - Keep HOB @ 45 degrees  - Follow up pleural effusion cytology  - No need for ABX now  - Keep SpO2> 94%  - pt is saturating well on room air, no hypoxia  - Discharge tomorrow if stays stable  - Follow up chest xray from today --> small L pneumo that needs to be monitored    # Non-Hodgking Lymphhoma  Diagnosed back in Aug 2020 when she had similar presentation  Used to follow up with Dr. Schultz but after receiving the first dose of the APVD she developed a "Bad allergic reaction" and switched completely to holistic medicine and has been off any chemotherapy since then.   - Will follow up with Dr. Childress outpatient to start immunotherapy    # Abdominal pain  Hx of H pylori treated 2 months ago  - Follow up H pylori stool  - Tramadol PRN for pain; can increase to 50 if needed    # Iron deficiency anemia  Total Serum Iron: 29   % iron saturation: 12   Was told by OP PCP that needs transfusion  - Venlofer x1 today    Diet: DASH  Activity: IAT  DVT Prophylaxis: Lovenox  CHG Order  Code Status: full  Disposition: from home       Statement Selected

## 2022-10-11 ENCOUNTER — APPOINTMENT (OUTPATIENT)
Dept: CARDIOTHORACIC SURGERY | Facility: CLINIC | Age: 42
End: 2022-10-11

## 2024-09-12 ENCOUNTER — RX ONLY (RX ONLY)
Age: 44
End: 2024-09-12

## 2024-09-12 ENCOUNTER — DOCTOR'S OFFICE (OUTPATIENT)
Dept: URBAN - METROPOLITAN AREA CLINIC 163 | Facility: CLINIC | Age: 44
Setting detail: OPHTHALMOLOGY
End: 2024-09-12
Payer: COMMERCIAL

## 2024-09-12 DIAGNOSIS — H04.123: ICD-10-CM

## 2024-09-12 DIAGNOSIS — H52.13: ICD-10-CM

## 2024-09-12 DIAGNOSIS — H11.013: ICD-10-CM

## 2024-09-12 DIAGNOSIS — C81.90: ICD-10-CM

## 2024-09-12 PROCEDURE — 92310 CONTACT LENS FITTING OU: CPT | Performed by: OPTOMETRIST

## 2024-09-12 PROCEDURE — 92004 COMPRE OPH EXAM NEW PT 1/>: CPT | Performed by: OPTOMETRIST

## 2024-09-12 ASSESSMENT — REFRACTION_MANIFEST
OD_CYLINDER: SPH
OD_VA1: 20/20
OS_SPHERE: -1.75
OS_VA1: 20/20
OD_SPHERE: -1.75
OU_VA: 20/15
OS_CYLINDER: SPH

## 2024-09-12 ASSESSMENT — REFRACTION_AUTOREFRACTION
OD_CYLINDER: +1.00
OS_CYLINDER: +1.00
OD_SPHERE: -1.75
OS_SPHERE: -2.00
OS_AXIS: 160
OD_AXIS: 175

## 2024-09-12 ASSESSMENT — CONFRONTATIONAL VISUAL FIELD TEST (CVF)
OS_FINDINGS: FULL
OD_FINDINGS: FULL

## 2024-09-12 ASSESSMENT — VISUAL ACUITY
OS_BCVA: 20/60-
OD_BCVA: 20/70

## 2024-09-12 ASSESSMENT — SUPERFICIAL PUNCTATE KERATITIS (SPK)
OD_SPK: T
OS_SPK: T